# Patient Record
Sex: FEMALE | URBAN - METROPOLITAN AREA
[De-identification: names, ages, dates, MRNs, and addresses within clinical notes are randomized per-mention and may not be internally consistent; named-entity substitution may affect disease eponyms.]

---

## 2020-06-18 ENCOUNTER — APPOINTMENT (OUTPATIENT)
Age: 50
Setting detail: DERMATOLOGY
End: 2020-06-18

## 2020-06-18 ENCOUNTER — TRANSFERRED RECORDS (OUTPATIENT)
Dept: HEALTH INFORMATION MANAGEMENT | Facility: CLINIC | Age: 50
End: 2020-06-18

## 2020-06-18 DIAGNOSIS — L64.8 OTHER ANDROGENIC ALOPECIA: ICD-10-CM

## 2020-06-18 DIAGNOSIS — L63.8 OTHER ALOPECIA AREATA: ICD-10-CM

## 2020-06-18 PROCEDURE — OTHER COUNSELING: OTHER

## 2020-06-18 PROCEDURE — OTHER PRESCRIPTION: OTHER

## 2020-06-18 PROCEDURE — 99203 OFFICE O/P NEW LOW 30 MIN: CPT

## 2020-06-18 RX ORDER — CLOBETASOL PROPIONATE 0.5 MG/ML
SOLUTION TOPICAL BID
Qty: 1 | Refills: 2 | Status: ERX

## 2020-06-18 ASSESSMENT — LOCATION ZONE DERM: LOCATION ZONE: SCALP

## 2020-06-18 ASSESSMENT — LOCATION SIMPLE DESCRIPTION DERM
LOCATION SIMPLE: SCALP
LOCATION SIMPLE: ANTERIOR SCALP

## 2020-06-18 ASSESSMENT — LOCATION DETAILED DESCRIPTION DERM
LOCATION DETAILED: MID-FRONTAL SCALP
LOCATION DETAILED: LEFT SUPERIOR PARIETAL SCALP

## 2020-06-18 NOTE — PROCEDURE: COUNSELING
Detail Level: Zone
Patient Specific Counseling (Will Not Stick From Patient To Patient): If no improvement will consider biopsies in future. She had lab work done with PCP and all was normal. Will discussed Dr. Ward at next appointment if no improvement.  On exam she does have some regrowth and I am hopeful that this will continue.  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  We discussed further workup at length if needed.

## 2020-06-18 NOTE — HPI: HAIR LOSS
Previous Labs: Yes
How Did The Hair Loss Occur?: sudden in onset
How Severe Is Your Hair Loss?: severe
Lab Details: Normal limits

## 2020-07-16 ENCOUNTER — APPOINTMENT (OUTPATIENT)
Age: 50
Setting detail: DERMATOLOGY
End: 2020-07-17

## 2020-07-16 ENCOUNTER — TRANSFERRED RECORDS (OUTPATIENT)
Dept: HEALTH INFORMATION MANAGEMENT | Facility: CLINIC | Age: 50
End: 2020-07-16

## 2020-07-16 DIAGNOSIS — L63.8 OTHER ALOPECIA AREATA: ICD-10-CM

## 2020-07-16 DIAGNOSIS — L64.8 OTHER ANDROGENIC ALOPECIA: ICD-10-CM

## 2020-07-16 PROCEDURE — OTHER PRESCRIPTION: OTHER

## 2020-07-16 PROCEDURE — OTHER COUNSELING: OTHER

## 2020-07-16 PROCEDURE — OTHER INTRALESIONAL KENALOG: OTHER

## 2020-07-16 PROCEDURE — 11900 INJECT SKIN LESIONS </W 7: CPT

## 2020-07-16 PROCEDURE — 99213 OFFICE O/P EST LOW 20 MIN: CPT | Mod: 25

## 2020-07-16 RX ORDER — SPIRONOLACTONE 25 MG/1
TABLET, FILM COATED ORAL
Qty: 60 | Refills: 1 | Status: CANCELLED

## 2020-07-16 RX ORDER — CLOBETASOL PROPIONATE 0.5 MG/ML
SOLUTION TOPICAL BID
Qty: 1 | Refills: 2 | Status: CANCELLED

## 2020-07-16 ASSESSMENT — LOCATION SIMPLE DESCRIPTION DERM
LOCATION SIMPLE: HAIR
LOCATION SIMPLE: ANTERIOR SCALP
LOCATION SIMPLE: SCALP
LOCATION SIMPLE: RIGHT FOREHEAD

## 2020-07-16 ASSESSMENT — LOCATION DETAILED DESCRIPTION DERM
LOCATION DETAILED: HAIR
LOCATION DETAILED: RIGHT FOREHEAD
LOCATION DETAILED: MID-FRONTAL SCALP
LOCATION DETAILED: LEFT SUPERIOR PARIETAL SCALP

## 2020-07-16 ASSESSMENT — LOCATION ZONE DERM
LOCATION ZONE: FACE
LOCATION ZONE: SCALP

## 2020-07-16 NOTE — PROCEDURE: COUNSELING
Detail Level: Zone
Patient Specific Counseling (Will Not Stick From Patient To Patient): If no improvement will consider biopsies in future. She had lab work done with PCP and all was normal. Will discussed Dr. Ward at next appointment if no improvement.  On exam she does have some regrowth and I am hopeful that this will continue.  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  We discussed further workup at length if needed.
Patient Specific Counseling (Will Not Stick From Patient To Patient): Overall she is stable and showing some mild regrowth.  She will CCC with Spironolacrone and purchase Biotin and Rogaine OTC

## 2020-07-16 NOTE — PROCEDURE: INTRALESIONAL KENALOG
Include Z78.9 (Other Specified Conditions Influencing Health Status) As An Associated Diagnosis?: No
Detail Level: Detailed
Kenalog Preparation: Kenalog
Concentration Of Solution Injected (Mg/Ml): 10.0
Consent: The risks of atrophy were reviewed with the patient.
Total Volume Injected (Ccs- Only Use Numbers And Decimals): 2
Medical Necessity Clause: This procedure was medically necessary because the lesions that were treated were:
X Size Of Lesion In Cm (Optional): 0

## 2020-08-05 ENCOUNTER — APPOINTMENT (OUTPATIENT)
Dept: URBAN - METROPOLITAN AREA CLINIC 259 | Age: 50
Setting detail: DERMATOLOGY
End: 2020-08-05

## 2020-08-05 ENCOUNTER — TRANSCRIBE ORDERS (OUTPATIENT)
Dept: OTHER | Age: 50
End: 2020-08-05

## 2020-08-05 DIAGNOSIS — B35.1 TINEA UNGUIUM: ICD-10-CM

## 2020-08-05 DIAGNOSIS — L84 CORNS AND CALLOSITIES: ICD-10-CM

## 2020-08-05 DIAGNOSIS — L65.9 HAIR LOSS: Primary | ICD-10-CM

## 2020-08-05 PROCEDURE — 17110 DESTRUCT B9 LESION 1-14: CPT

## 2020-08-05 PROCEDURE — OTHER COUNSELING: OTHER

## 2020-08-05 PROCEDURE — OTHER BENIGN DESTRUCTION: OTHER

## 2020-08-05 PROCEDURE — 99213 OFFICE O/P EST LOW 20 MIN: CPT | Mod: 25

## 2020-08-05 PROCEDURE — OTHER PRESCRIPTION: OTHER

## 2020-08-05 RX ORDER — CICLOPIROX 80 MG/ML
SOLUTION TOPICAL
Qty: 1 | Refills: 3 | Status: ERX

## 2020-08-05 ASSESSMENT — LOCATION DETAILED DESCRIPTION DERM
LOCATION DETAILED: LEFT PLANTAR FOREFOOT OVERLYING 2ND METATARSAL
LOCATION DETAILED: LEFT GREAT TOENAIL
LOCATION DETAILED: RIGHT GREAT TOENAIL

## 2020-08-05 ASSESSMENT — LOCATION SIMPLE DESCRIPTION DERM
LOCATION SIMPLE: LEFT GREAT TOE
LOCATION SIMPLE: RIGHT GREAT TOE
LOCATION SIMPLE: LEFT PLANTAR SURFACE

## 2020-08-05 ASSESSMENT — LOCATION ZONE DERM
LOCATION ZONE: TOENAIL
LOCATION ZONE: FEET

## 2020-08-05 NOTE — HPI: INFECTION (ONYCHOMYCOSIS)
How Severe Is It?: mild
Is This A New Presentation, Or A Follow-Up?: Nail Infection
Additional History: Patient states that she also has a wart on the bottom of the left foot that is tender with pressure application.

## 2020-08-05 NOTE — PROCEDURE: BENIGN DESTRUCTION
Add 52 Modifier (Optional): no
Anesthesia Volume In Cc: 0.5
Post-Care Instructions: I reviewed with the patient in detail post-care instructions. Patient is to wear sunprotection, and avoid picking at any of the treated lesions. Pt may apply Vaseline to crusted or scabbing areas.
Consent: The patient's consent was obtained including but not limited to risks of crusting, scabbing, blistering, scarring, darker or lighter pigmentary change, recurrence, incomplete removal and infection.
Detail Level: Detailed
Medical Necessity Information: It is in your best interest to select a reason for this procedure from the list below. All of these items fulfill various CMS LCD requirements except the new and changing color options.
Medical Necessity Clause: This procedure was medically necessary because the lesions that were treated were:
Treatment Number (Will Not Render If 0): 0

## 2020-08-11 ENCOUNTER — APPOINTMENT (OUTPATIENT)
Dept: URBAN - METROPOLITAN AREA CLINIC 257 | Age: 50
Setting detail: DERMATOLOGY
End: 2020-08-12

## 2020-08-11 DIAGNOSIS — L63.8 OTHER ALOPECIA AREATA: ICD-10-CM

## 2020-08-11 DIAGNOSIS — L64.8 OTHER ANDROGENIC ALOPECIA: ICD-10-CM

## 2020-08-11 PROCEDURE — OTHER ORDER TESTS: OTHER

## 2020-08-11 PROCEDURE — OTHER PRESCRIPTION: OTHER

## 2020-08-11 PROCEDURE — 99213 OFFICE O/P EST LOW 20 MIN: CPT | Mod: 24

## 2020-08-11 PROCEDURE — OTHER COUNSELING: OTHER

## 2020-08-11 RX ORDER — SPIRONOLACTONE 50 MG/1
TABLET, FILM COATED ORAL BID
Qty: 60 | Refills: 3 | Status: ERX

## 2020-08-11 RX ORDER — SPIRONOLACTONE 25 MG/1
TABLET, FILM COATED ORAL
Qty: 60 | Refills: 1 | Status: CANCELLED

## 2020-08-11 ASSESSMENT — LOCATION DETAILED DESCRIPTION DERM
LOCATION DETAILED: LEFT SUPERIOR PARIETAL SCALP
LOCATION DETAILED: MID-FRONTAL SCALP

## 2020-08-11 ASSESSMENT — LOCATION SIMPLE DESCRIPTION DERM
LOCATION SIMPLE: ANTERIOR SCALP
LOCATION SIMPLE: SCALP

## 2020-08-11 ASSESSMENT — LOCATION ZONE DERM: LOCATION ZONE: SCALP

## 2020-08-11 NOTE — PROCEDURE: COUNSELING
Detail Level: Zone
Patient Specific Counseling (Will Not Stick From Patient To Patient): Overall she is seeing increase in hair loss.  We will do the hair loss workup and increase her spironolactone.  We will also again refer her to Dr Ward for further evaluation as well.  She will CCC with Biotin and Rogaine OTC
Patient Specific Counseling (Will Not Stick From Patient To Patient): If no improvement will consider biopsies in future. She had lab work done with PCP and all was normal but it is reasonable to do some labs today as above. On exam she does have some regrowth and I am hopeful that this will continue.  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  We discussed the further workup at length and her referal to Dr Ward.  She agrees with the plan.

## 2020-08-12 ENCOUNTER — APPOINTMENT (OUTPATIENT)
Dept: URBAN - METROPOLITAN AREA CLINIC 259 | Age: 50
Setting detail: DERMATOLOGY
End: 2020-08-12

## 2020-08-12 DIAGNOSIS — L63.8 OTHER ALOPECIA AREATA: ICD-10-CM

## 2020-08-12 PROCEDURE — OTHER ORDER TESTS: OTHER

## 2020-08-26 ENCOUNTER — TRANSFERRED RECORDS (OUTPATIENT)
Dept: HEALTH INFORMATION MANAGEMENT | Facility: CLINIC | Age: 50
End: 2020-08-26

## 2020-08-27 ENCOUNTER — APPOINTMENT (OUTPATIENT)
Dept: URBAN - METROPOLITAN AREA CLINIC 259 | Age: 50
Setting detail: DERMATOLOGY
End: 2020-08-28

## 2020-08-27 DIAGNOSIS — L63.0 ALOPECIA (CAPITIS) TOTALIS: ICD-10-CM

## 2020-08-27 PROCEDURE — OTHER PRESCRIPTION: OTHER

## 2020-08-27 PROCEDURE — OTHER COUNSELING: OTHER

## 2020-08-27 PROCEDURE — 99213 OFFICE O/P EST LOW 20 MIN: CPT

## 2020-08-27 RX ORDER — CLOBETASOL PROPIONATE 0.05 G/100ML
SHAMPOO TOPICAL
Qty: 1 | Refills: 5 | Status: ERX

## 2020-08-27 NOTE — PROCEDURE: COUNSELING
Detail Level: Detailed
Patient Specific Counseling (Will Not Stick From Patient To Patient): Her mixed pattern of alopecia has progressed to more of a totalis.  At this point we will continue her Biotiin 5000mcg daily, Spironolactone 50 mg bid and add Clobex shampoo daily for her.  We will again reach out to Dr Rivero's office and try to get her in there as soon as possible.  Prescription for wig written.  Her hair loss labs are all reviewed today and all in normal ranges.  She is also seeing a natropathic medicine provider and I asked her to d/c this for now until she can meet with Dr Ward.  She agrees to plan.

## 2020-09-24 ENCOUNTER — TRANSFERRED RECORDS (OUTPATIENT)
Dept: HEALTH INFORMATION MANAGEMENT | Facility: CLINIC | Age: 50
End: 2020-09-24

## 2020-09-24 ENCOUNTER — APPOINTMENT (OUTPATIENT)
Dept: URBAN - METROPOLITAN AREA CLINIC 259 | Age: 50
Setting detail: DERMATOLOGY
End: 2020-09-25

## 2020-09-24 DIAGNOSIS — R20.8 OTHER DISTURBANCES OF SKIN SENSATION: ICD-10-CM

## 2020-09-24 DIAGNOSIS — L63.0 ALOPECIA (CAPITIS) TOTALIS: ICD-10-CM

## 2020-09-24 PROBLEM — L65.9 NONSCARRING HAIR LOSS, UNSPECIFIED: Status: ACTIVE | Noted: 2020-09-24

## 2020-09-24 PROCEDURE — 88305 TISSUE EXAM BY PATHOLOGIST: CPT

## 2020-09-24 PROCEDURE — OTHER COUNSELING: OTHER

## 2020-09-24 PROCEDURE — 11104 PUNCH BX SKIN SINGLE LESION: CPT

## 2020-09-24 PROCEDURE — 99213 OFFICE O/P EST LOW 20 MIN: CPT | Mod: 25

## 2020-09-24 PROCEDURE — OTHER PATHOLOGY BILLING: OTHER

## 2020-09-24 PROCEDURE — OTHER BIOPSY BY PUNCH METHOD: OTHER

## 2020-09-24 PROCEDURE — 11105 PUNCH BX SKIN EA SEP/ADDL: CPT

## 2020-09-24 ASSESSMENT — LOCATION SIMPLE DESCRIPTION DERM: LOCATION SIMPLE: SCALP

## 2020-09-24 ASSESSMENT — LOCATION ZONE DERM: LOCATION ZONE: SCALP

## 2020-09-24 ASSESSMENT — LOCATION DETAILED DESCRIPTION DERM: LOCATION DETAILED: LEFT SUPERIOR PARIETAL SCALP

## 2020-09-24 NOTE — PROCEDURE: PATHOLOGY BILLING
Immunohistochemistry ([id number] and [id number]) billing is not performed here. Please use the Immunohistochemistry Stain Billing plan to accomplish this. Immunohistochemistry ([id number] and [id number]) billing is not performed here. Please use the Immunohistochemistry Stain Billing plan to accomplish this.

## 2020-09-24 NOTE — PROCEDURE: BIOPSY BY PUNCH METHOD
Validate Triangulation: No
Validate Note Data (See Information Below): Yes
X Size Of Lesion In Cm (Optional): 0
Body Location Override (Optional - Billing Will Still Be Based On Selected Body Map Location If Applicable): left crown - medial
Punch Size In Mm: 3
Anesthesia Volume In Cc (Will Not Render If 0): 0.5
Post-Care Instructions: I reviewed with the patient in detail post-care instructions. Patient is to keep the biopsy site dry overnight, and then apply bacitracin twice daily until healed. Patient may apply hydrogen peroxide soaks to remove any crusting.
Epidermal Sutures: gel foam
Body Location Override (Optional - Billing Will Still Be Based On Selected Body Map Location If Applicable): left crown -lateral
Billing Type: Third-Party Bill
Hemostasis: None
Anesthesia Type: 1% lidocaine with epinephrine
Wound Care: Petrolatum
Notification Instructions: Patient will be notified of biopsy results. However, patient instructed to call the office if not contacted within 2 weeks.
Consent: Written consent was obtained and risks were reviewed including but not limited to scarring, infection, bleeding, scabbing, incomplete removal, nerve damage and allergy to anesthesia.
Information: Selecting Yes will display possible errors in your note based on the variables you have selected. This validation is only offered as a suggestion for you. PLEASE NOTE THAT THE VALIDATION TEXT WILL BE REMOVED WHEN YOU FINALIZE YOUR NOTE. IF YOU WANT TO FAX A PRELIMINARY NOTE YOU WILL NEED TO TOGGLE THIS TO 'NO' IF YOU DO NOT WANT IT IN YOUR FAXED NOTE.
Biopsy Type: H and E
Post-Care Instructions: I reviewed with the patient in detail post-care instructions. Patient is to keep the biopsy site covered with Vaseline and bandage for 1 week
Dressing: bandage
Detail Level: Detailed
Home Suture Removal Text: Patient was provided a home suture removal kit and will remove their sutures at home.  If they have any questions or difficulties they will call the office.

## 2020-09-24 NOTE — PROCEDURE: COUNSELING
Patient Specific Counseling (Will Not Stick From Patient To Patient): She has an appointment to see Dr Ward in a few months and we will do the biopises today to confirm alopecia totalis/areata
Detail Level: Detailed

## 2021-02-22 ENCOUNTER — DOCUMENTATION ONLY (OUTPATIENT)
Dept: CARE COORDINATION | Facility: CLINIC | Age: 51
End: 2021-02-22

## 2021-03-15 ENCOUNTER — OFFICE VISIT (OUTPATIENT)
Dept: DERMATOLOGY | Facility: CLINIC | Age: 51
End: 2021-03-15
Payer: COMMERCIAL

## 2021-03-15 ENCOUNTER — VIRTUAL VISIT (OUTPATIENT)
Dept: ENDOCRINOLOGY | Facility: CLINIC | Age: 51
End: 2021-03-15
Payer: COMMERCIAL

## 2021-03-15 DIAGNOSIS — R94.6 ABNORMAL FINDING ON THYROID FUNCTION TEST: ICD-10-CM

## 2021-03-15 DIAGNOSIS — L65.9 LOSS OF HAIR: ICD-10-CM

## 2021-03-15 DIAGNOSIS — E05.90 HYPERTHYROIDISM: Primary | ICD-10-CM

## 2021-03-15 DIAGNOSIS — L63.1 ALOPECIA UNIVERSALIS: Primary | ICD-10-CM

## 2021-03-15 DIAGNOSIS — L65.9 ALOPECIA: ICD-10-CM

## 2021-03-15 DIAGNOSIS — R89.9 ABNORMAL LABORATORY TEST: ICD-10-CM

## 2021-03-15 LAB
ALBUMIN SERPL-MCNC: 4.1 G/DL (ref 3.4–5)
ALP SERPL-CCNC: 98 U/L (ref 40–150)
ALT SERPL W P-5'-P-CCNC: 40 U/L (ref 0–50)
ANION GAP SERPL CALCULATED.3IONS-SCNC: 6 MMOL/L (ref 3–14)
AST SERPL W P-5'-P-CCNC: 20 U/L (ref 0–45)
BASOPHILS # BLD AUTO: 0.1 10E9/L (ref 0–0.2)
BASOPHILS NFR BLD AUTO: 0.9 %
BILIRUB SERPL-MCNC: 0.4 MG/DL (ref 0.2–1.3)
BUN SERPL-MCNC: 9 MG/DL (ref 7–30)
CALCIUM SERPL-MCNC: 9.8 MG/DL (ref 8.5–10.1)
CHLORIDE SERPL-SCNC: 109 MMOL/L (ref 94–109)
CO2 SERPL-SCNC: 28 MMOL/L (ref 20–32)
CREAT SERPL-MCNC: 0.74 MG/DL (ref 0.52–1.04)
DEPRECATED CALCIDIOL+CALCIFEROL SERPL-MC: 119 UG/L (ref 20–75)
DHEA-S SERPL-MCNC: 172 UG/DL (ref 35–430)
DIFFERENTIAL METHOD BLD: ABNORMAL
EOSINOPHIL # BLD AUTO: 0.3 10E9/L (ref 0–0.7)
EOSINOPHIL NFR BLD AUTO: 3.5 %
ERYTHROCYTE [DISTWIDTH] IN BLOOD BY AUTOMATED COUNT: 17.9 % (ref 10–15)
FERRITIN SERPL-MCNC: 214 NG/ML (ref 8–252)
FSH SERPL-ACNC: 43.8 IU/L
GFR SERPL CREATININE-BSD FRML MDRD: >90 ML/MIN/{1.73_M2}
GLUCOSE SERPL-MCNC: 105 MG/DL (ref 70–99)
HCT VFR BLD AUTO: 43.8 % (ref 35–47)
HGB BLD-MCNC: 13.7 G/DL (ref 11.7–15.7)
IMM GRANULOCYTES # BLD: 0 10E9/L (ref 0–0.4)
IMM GRANULOCYTES NFR BLD: 0.4 %
IRON SATN MFR SERPL: 27 % (ref 15–46)
IRON SERPL-MCNC: 82 UG/DL (ref 35–180)
LYMPHOCYTES # BLD AUTO: 2.2 10E9/L (ref 0.8–5.3)
LYMPHOCYTES NFR BLD AUTO: 27.3 %
MCH RBC QN AUTO: 26 PG (ref 26.5–33)
MCHC RBC AUTO-ENTMCNC: 31.3 G/DL (ref 31.5–36.5)
MCV RBC AUTO: 83 FL (ref 78–100)
MONOCYTES # BLD AUTO: 0.6 10E9/L (ref 0–1.3)
MONOCYTES NFR BLD AUTO: 6.7 %
NEUTROPHILS # BLD AUTO: 5 10E9/L (ref 1.6–8.3)
NEUTROPHILS NFR BLD AUTO: 61.2 %
NRBC # BLD AUTO: 0 10*3/UL
NRBC BLD AUTO-RTO: 0 /100
PLATELET # BLD AUTO: 357 10E9/L (ref 150–450)
POTASSIUM SERPL-SCNC: 4 MMOL/L (ref 3.4–5.3)
PROT SERPL-MCNC: 7.3 G/DL (ref 6.8–8.8)
RBC # BLD AUTO: 5.27 10E12/L (ref 3.8–5.2)
SODIUM SERPL-SCNC: 143 MMOL/L (ref 133–144)
TIBC SERPL-MCNC: 310 UG/DL (ref 240–430)
WBC # BLD AUTO: 8.2 10E9/L (ref 4–11)

## 2021-03-15 PROCEDURE — 84270 ASSAY OF SEX HORMONE GLOBUL: CPT | Mod: 90 | Performed by: PATHOLOGY

## 2021-03-15 PROCEDURE — 83540 ASSAY OF IRON: CPT | Performed by: PATHOLOGY

## 2021-03-15 PROCEDURE — 36415 COLL VENOUS BLD VENIPUNCTURE: CPT | Performed by: PATHOLOGY

## 2021-03-15 PROCEDURE — 83550 IRON BINDING TEST: CPT | Performed by: PATHOLOGY

## 2021-03-15 PROCEDURE — 99000 SPECIMEN HANDLING OFFICE-LAB: CPT | Performed by: PATHOLOGY

## 2021-03-15 PROCEDURE — 99204 OFFICE O/P NEW MOD 45 MIN: CPT | Mod: GT | Performed by: INTERNAL MEDICINE

## 2021-03-15 PROCEDURE — 83001 ASSAY OF GONADOTROPIN (FSH): CPT | Performed by: PATHOLOGY

## 2021-03-15 PROCEDURE — 99204 OFFICE O/P NEW MOD 45 MIN: CPT | Mod: GC | Performed by: DERMATOLOGY

## 2021-03-15 PROCEDURE — 84403 ASSAY OF TOTAL TESTOSTERONE: CPT | Mod: 90 | Performed by: PATHOLOGY

## 2021-03-15 PROCEDURE — 84630 ASSAY OF ZINC: CPT | Mod: 90 | Performed by: PATHOLOGY

## 2021-03-15 PROCEDURE — 85025 COMPLETE CBC W/AUTO DIFF WBC: CPT | Performed by: PATHOLOGY

## 2021-03-15 PROCEDURE — 84255 ASSAY OF SELENIUM: CPT | Mod: 90 | Performed by: PATHOLOGY

## 2021-03-15 PROCEDURE — 82306 VITAMIN D 25 HYDROXY: CPT | Mod: 90 | Performed by: PATHOLOGY

## 2021-03-15 PROCEDURE — 82728 ASSAY OF FERRITIN: CPT | Performed by: PATHOLOGY

## 2021-03-15 PROCEDURE — 80053 COMPREHEN METABOLIC PANEL: CPT | Performed by: PATHOLOGY

## 2021-03-15 PROCEDURE — 82627 DEHYDROEPIANDROSTERONE: CPT | Mod: 90 | Performed by: PATHOLOGY

## 2021-03-15 RX ORDER — METHIMAZOLE 10 MG/1
5 TABLET ORAL DAILY
Qty: 30 TABLET | Refills: 0
Start: 2021-03-15 | End: 2021-03-22

## 2021-03-15 RX ORDER — CHOLECALCIFEROL (VITAMIN D3) 125 MCG
CAPSULE ORAL
COMMUNITY
End: 2021-07-04

## 2021-03-15 RX ORDER — ERGOCALCIFEROL 1.25 MG/1
CAPSULE, LIQUID FILLED ORAL
COMMUNITY
End: 2021-07-04

## 2021-03-15 RX ORDER — METHIMAZOLE 10 MG/1
5 TABLET ORAL DAILY
Qty: 30 TABLET | Refills: 0 | Status: CANCELLED | OUTPATIENT
Start: 2021-03-15

## 2021-03-15 RX ORDER — METHIMAZOLE 10 MG/1
10 TABLET ORAL DAILY
COMMUNITY
Start: 2021-03-06 | End: 2021-03-15

## 2021-03-15 ASSESSMENT — PAIN SCALES - GENERAL: PAINLEVEL: NO PAIN (0)

## 2021-03-15 NOTE — PROGRESS NOTES
[facility]atology Note  Encounter Date: Mar 15, 2021  Office visit    Dermatology Problem List:  1. 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 Possible component of telogen effluvium.  * Reviewed labs today: TSH, Free T4, Derm path report 9/24/20  - Will get new labs today: Ferritin, total iron, iron binding capacity, CBC with differential, zinc, DHEAS, Free testosterone and total vitamin D, selenium, FSH were ordered  - interested in learning more about the clinical trials - will have research staff reach out to patient  - not doing any topical therapies currently d/t shoulder and elbow pain   - consider Xeljanz in the future, provided patient with Journal article regarding safety and efficacy of Xeljanz  - patient says she plans to hold off on the DPCP at [place] for now     Procedures Performed:   None    Follow-up: 2 months    Staff and Resident:   Kelly Botello MD, Dermatology Resident, PGY-2      Patient was seen and examined with the dermatology resident. I agree with the history, review of systems, physical examination, assessments and plan.    Zainab Oliver MD  Professor and  Chair  Department of Dermatology  [facility]  ____________________________________________    CC: Hair Loss (Lupe is here today for loss of all hair. She states she has lost all of her body hair.)      HPI:  Ms. Lupe Deluna is a(n) 50 year old female who presents today as a new patient for evaluation of hair loss.     She is being seen at the request of  Guy Mckeon PA-C from [facility]. Referred for confirmation of diagnosis and further management.     Says she had a flu-like illness in Nov 2019 and started to have shoulder  "pain as well at this time. Hair loss started in March 2020. At that time, she noticed a \"receding spot\" on the front of the hair line and noticed a little bit more shedding in the shower. Then over subsequent months started to notice more shedding. First saw Derm in June 2020 at [place] [facility]. Initially thought to be \"female pattern hair loss\" and put on spironolactone and topical steroid. Went back in July 2020 and started Rogaine and ILK injections and was continued on spironolactone. Early August, started to notice even more shedding frontal scalp > posterior scalp. Reports that she had labs done at that point and they were unremarkable. Saw a holistic NP in [place] and started doing \"bioidential hormone\" therapy with her (replacement of testosterone, progresterone, and estrogen - all of these she started after the hair loss started). Also starting supplements, thinks maybe it contains selenium. Then saw [facility] and they said all the labs are normal so that they weren't going to do anything. Had continued shoulder pain throughout this whole course so PCP referred her to Rheum. Has been evaluated by Rheum and was told that the shoulder pain is likely unrelated. Later TSH was rechecked and it was very low. Was started on the methimazole 1/9/21. Has not noticed any improvement since starting the methimazole. Plans to switch to [facility] for further management. Say [facility] Jan and Feb 2021. Was put on pred taper starting at 60 mg, more recent dose 3/3/21. Considering start DPCP with [facility].     Denies any hx of asthma or eczema. Denies any family hx of alopecia areata or type I DM.     Previous treatments: Rogaine, ILK, oral spironolactone, topical clobetasol, clobex shampoo, and prednisone taper from 60 mg, last dose final dose was 3/3/21.     Current treatments: None. (has Triam for eyebrows and Latisse for eyelashes but not currently using any topicals because she sprained her elbow and has difficulty " applying them).     - Scalp pain: no  - Scalp burning: no  - Scalp itching: no  - Eyebrow or eyelash changes: yes, complete loss  - Nail changes: yes    ROS: As per HPI    Labs:  TSH reviewed.    3/1/21:   - TSH: 4.44  - T4, Free: 0.81    12/24/20:   - TSH: <0.01 (low)  - Free T4 3.3 (high)    Dermpath report - reviewed  9/24/20  - c/w alopecia areta  - full report in Media tab     Physical Exam:  Vitals: There were no vitals taken for this visit.  SKIN:   Focused exam of scalp, forehead, and hands was performed and was significant for:  - fine vellus fibers on the midface region  - no evident hair on scalp, eyebrows, or eyelashes; follicular ostia intact   - horizontal ridging on nails and irregular pitting on all fingernails     Medications:  Current Outpatient Medications   Medication     Prasterone, DHEA, (DHEA 50) 50 MG CAPS     Calcium Carb-Cholecalciferol (CALCIUM 1000 + D PO)     methimazole (TAPAZOLE) 10 MG tablet     Multiple Vitamins-Minerals (ONE-A-DAY WITHIN) TABS     Quercetin 250 MG TABS     vitamin D2 (ERGOCALCIFEROL) 54495 units (1250 mcg) capsule     No current facility-administered medications for this visit.       Past Medical/Surgical History:   There is no problem list on file for this patient.    History reviewed. No pertinent past medical history.    [facility]  [address]  [place] [address] on close of this encounter.

## 2021-03-15 NOTE — LETTER
3/15/2021       RE: Lupe Deluna  [address] [address]     Dear Colleague,    Thank you for referring your patient, Lupe Deluna, to the [facility] [place] at [facility]. Please see a copy of my visit note below.    [facility] Dermatology Note  Encounter Date: Mar 15, 2021  Office visit    Dermatology Problem List:  1. 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 Possible component of telogen effluvium.  * Reviewed labs today: TSH, Free T4, Derm path report 9/24/20  - Will get new labs today: Ferritin, total iron, iron binding capacity, CBC with differential, zinc, DHEAS, Free testosterone and total vitamin D, selenium, FSH were ordered  - interested in learning more about the clinical trials - will have research staff reach out to patient  - not doing any topical therapies currently d/t shoulder and elbow pain   - consider Xeljanz in the future, provided patient with Journal article regarding safety and efficacy of Xeljanz  - patient says she plans to hold off on the DPCP at [place] for now     Procedures Performed:   None    Follow-up: 2 months    Staff and Resident:   Kelly Botello MD, Dermatology Resident, PGY-2      Patient was seen and examined with the dermatology resident. I agree with the history, review of systems, physical examination, assessments and plan.    Zainab Oliver MD  Professor and  Chair  Department of Dermatology  [facility]  ____________________________________________    CC: Hair Loss (Lupe is here today for loss of all hair. She states she has lost all of her body hair.)      HPI:  Ms.  "Lupe Deluna is a(n) 50 year old female who presents today as a new patient for evaluation of hair loss.     She is being seen at the request of  Guy Mckeon PA-C from [facility]. Referred for confirmation of diagnosis and further management.     Says she had a flu-like illness in Nov 2019 and started to have shoulder pain as well at this time. Hair loss started in March 2020. At that time, she noticed a \"receding spot\" on the front of the hair line and noticed a little bit more shedding in the shower. Then over subsequent months started to notice more shedding. First saw Derm in June 2020 at [facility]. Initially thought to be \"female pattern hair loss\" and put on spironolactone and topical steroid. Went back in July 2020 and started Rogaine and ILK injections and was continued on spironolactone. Early August, started to notice even more shedding frontal scalp > posterior scalp. Reports that she had labs done at that point and they were unremarkable. Saw a holistic NP in [place] and started doing \"bioidential hormone\" therapy with her (replacement of testosterone, progresterone, and estrogen - all of these she started after the hair loss started). Also starting supplements, thinks maybe it contains selenium. Then saw endo and they said all the labs are normal so that they weren't going to do anything. Had continued shoulder pain throughout this whole course so PCP referred her to Rheum. Has been evaluated by Rheum and was told that the shoulder pain is likely unrelated. Later TSH was rechecked and it was very low. Was started on the methimazole 1/9/21. Has not noticed any improvement since starting the methimazole. Plans to switch to [facility] for further management. Say [facility] Jan and Feb 2021. Was put on pred taper starting at 60 mg, more recent dose 3/3/21. Considering start DPCP with [facility].     Denies any hx of asthma or eczema. Denies any family hx of alopecia areata or type I DM. "     Previous treatments: Rogaine, ILK, oral spironolactone, topical clobetasol, clobex shampoo, and prednisone taper from 60 mg, last dose final dose was 3/3/21.     Current treatments: None. (has Triam for eyebrows and Latisse for eyelashes but not currently using any topicals because she sprained her elbow and has difficulty applying them).     - Scalp pain: no  - Scalp burning: no  - Scalp itching: no  - Eyebrow or eyelash changes: yes, complete loss  - Nail changes: yes    ROS: As per HPI    Labs:  TSH reviewed.    3/1/21:   - TSH: 4.44  - T4, Free: 0.81    12/24/20:   - TSH: <0.01 (low)  - Free T4 3.3 (high)    Dermpath report - reviewed  9/24/20  - c/w alopecia areta  - full report in Media tab     Physical Exam:  Vitals: There were no vitals taken for this visit.  SKIN:   Focused exam of scalp, forehead, and hands was performed and was significant for:  - fine vellus fibers on the midface region  - no evident hair on scalp, eyebrows, or eyelashes; follicular ostia intact   - horizontal ridging on nails and irregular pitting on all fingernails     Medications:  Current Outpatient Medications   Medication     Prasterone, DHEA, (DHEA 50) 50 MG CAPS     Calcium Carb-Cholecalciferol (CALCIUM 1000 + D PO)     methimazole (TAPAZOLE) 10 MG tablet     Multiple Vitamins-Minerals (ONE-A-DAY WITHIN) TABS     Quercetin 250 MG TABS     vitamin D2 (ERGOCALCIFEROL) 28350 units (1250 mcg) capsule     No current facility-administered medications for this visit.       Past Medical/Surgical History:   There is no problem list on file for this patient.    History reviewed. No pertinent past medical history.    CC Guy Mckeon PA-C  [facility]  [address]  [place] [address] on close of this encounter.         Again, thank you for allowing me to participate in the care of your patient.      Sincerely,    Zainab Oliver MD

## 2021-03-15 NOTE — LETTER
3/15/2021         RE: Lupe Deluna  [address] [address]        Dear Colleague,    Thank you for referring your patient, Lupe Deluna, to the [facility]. Please see a copy of my visit note below.    Endocrine Diabetes Consult Video visit note-     Due to the COVID 19 pandemic this visit was a telephone /video visit in order to help prevent spread of infection in this high risk patient and the general population. The patient gave verbal consent for the visit today.    Start time 09: 58 am  Stop time 11: 17 am    Chief complaint: Hyperthyroidism, hair loss  Referring provider: self referred      Assessment/Plan :   49 yo F with PMH of alopecia universalis who presents for evaluation of abnormal thyroid labs    Biochemical hyperthyroidism in 12/2020  She had biochemical evidence of hyperthyroidism on 12/14/20 (was on biotin at that time) with persistent hyperthyroid pattern on repeat labs on 12/24/2020 (a few days off biotin). TRab was negative, however TSI was never checked. Thyroid US favored thyroiditis, however she was already started on methimazole and last labs on 3/1 showed TSH on ULN at 4.44 and fT4 at lower limit 0.81.  The clinical picture is not clear here. She denies any hx of viral illness in the last 6 months. Differential diagnosis includes grave's disease vs thyroiditis vs biotin induced lab abnormality.    - Based on her recent TFTs, will decrease her methimazole to 5 mg daily.  - Add on free T4 and TSI to today's labs  - Repeat TSH and free T4 in one month    Alopecia universalis:  She has severe alopecia. We explained her that alopecia can be be seen in untreated hyop- or hyperthyroidism. However in her case, the appearance of alopecia far precedes the development of abnormal TFTS by almost one year, making it unlikely to be the causative agent here. However, both graves disease and alopecia have autoimmune pathology and presence of  "one autoimmune disease increases the likelihood of another autoimmune disease.    S/p hysterectomy in 2015  On bio-identical hormones (testosterone, estrogen and progesterone) prescribed by a PA at [facility]    C in 3 months    The case was discussed with my attending, Dr. Singleton.    Marysol Dodd MD  PGY-4 Endocrine Fellow        HISTORY OF PRESENT ILLNESS  Lupe is a 51 yo F with PMH of alopecia universalis who presents for evaluation of abnormal thyroid labs.    She states she started losing her hair in early 2020 to the point that she had to shave her head in august 2020. She has lost her eyelashes, eyebrows and has absolutely no hair left on her body. She has seen dermatology, has had biopsy of the scalp, scalp steroid injections and finished a prednisone taper from 1/20 - 3/3, all without any improvement.    She has always thought that her alopecia is secondary to her thyroid illness. She also had frozen shoulder in 2019 that she also links with underlying thyroid problem. She reports extensive hx of thyroid problems on his father's side.  However her thyroid labs have always been normal since 2005.    She knows somebody who had alopecia and normal thyroid labs, yet started synthroid and her hair grew back. She asked a PA-c that she sees for her hormones for synthroid who checked her labs.  12/14/2020: TSH <0.01, fT4 2.12 and free T3 6.22. However she was on biotin at that time. She has never been on synthroid.  [facility] was [facility] to [facility].    Labs repeated at [facility] (a few days after stopping biotin) showed persistent results.  12/24/2020: TSH <0.01, free T4 3.3, total T3 250, TRab <0.01.  She was started on methimazole 20 mg daily for a month, that was tapered to 10 mg daily that she is still taking.    US on 1/20/2021 showed heterogenous hyperemic thyroid gland, likely reflecting thyroiditis, without any nodules.    Labs on 3/1/2021 showed normal TSH at ULN 4.44 and free T4 0.81    She is s/p " "hysterectomy in 2015 for uterine mass and dysmenorrhea, was not on HRT. However she started seeing a PA at [facility] who started her on \"bioidentical hormones\", testosterone, estrogen and progesterone in aug 2020.    She has been having hot flashes for a year, has always been on the constipated side. She reports palpitations, anxiety due to her alopecia, brittle nails and increased sweating for 1 year. Denies any tremor, neck swelling, compressive symptoms.    Denies any URI or viral infection in the last 6 months.    Reports eye dryness.    She has been on autoimmune diet.  Family hx is positive for hypothyroidism on father's side, parathyroid problem in paternal grandmother and lupus in her daughter.    She works as a . Lives with her boyfriend and BF's father.      REVIEW OF SYSTEMS    10 system ROS otherwise as per the HPI or negative    Past Medical History  History reviewed. No pertinent past medical history.    Medications  Current Outpatient Medications   Medication Sig Dispense Refill     Calcium Carb-Cholecalciferol (CALCIUM 1000 + D PO)        Cholecalciferol (VITAMIN D) 125 MCG (5000 UT) capsule        methimazole (TAPAZOLE) 10 MG tablet Take 10 mg by mouth daily       Multiple Vitamins-Minerals (ZINC PO) Take by mouth daily       NONFORMULARY Estrogen/Testosterone cream 4mg       NONFORMULARY Methyl B Complex       NONFORMULARY progesterone 150 mg capsule       Quercetin 250 MG TABS 500 mg       Multiple Vitamins-Minerals (ONE-A-DAY WITHIN) TABS        NONFORMULARY Apply 1 g topically estradiol 0.03 %, testosterone 0.01 % in versabase cream (vaginal)(tube)       NONFORMULARY NALTREXONE HCL, BULK,       NONFORMULARY POTASSIUM CITRATE ORAL       Prasterone, DHEA, (DHEA 50) 50 MG CAPS        Prasterone, DHEA, 10 MG CAPS        vitamin D2 (ERGOCALCIFEROL) 97950 units (1250 mcg) capsule            Active Diet Order  None      Allergies  Allergies   Allergen Reactions     Tramadol " Itching     Sulfa Drugs Diarrhea       Family History  family history is not on file.    Social History  Social History     Tobacco Use     Smoking status: Former Smoker     Smokeless tobacco: Never Used   Substance Use Topics     Alcohol use: None     Drug use: None       Physical Exam  There were no vitals taken for this visit.  There is no height or weight on file to calculate BMI.  GENERAL :  In no apparent distress  GENERAL: Healthy, alert and no distress  EYES: Eyes grossly normal to inspection.  No discharge or erythema, or obvious scleral/conjunctival abnormalities.  RESP: No audible wheeze, cough, or visible cyanosis.  No visible retractions or increased work of breathing.    SKIN: Visible skin clear. No significant rash, abnormal pigmentation or lesions.  NEURO: Cranial nerves grossly intact.  Mentation and speech appropriate for age.  PSYCH: Mentation appears normal, affect normal/bright, judgement and insight intact, normal speech and appearance well-groomed.     DATA REVIEW  6/4/2020: FSH 11.8  8/12/2020: LH 8, DHEAS 99.8, testosterone 18  8/26/2020: salivary cortisol 8/26/20     thyroid 1/20/2021  Heterogeneous thyroid parenchyma with hyperemia may reflect changes of thyroiditis. No distinct thyroid nodules.    Attestation    I have reviewed chart, obtained history, and discussed management plan with the patient with the Endocrinology Fellow on March 15, 2021.  I agree with the assessment and plan of care as documented above.      Discussed and share the following pt instructions with the patient.   Patient Instructions   Lupe,    #1 hyperthyroidism -we noted that your thyroid blood work results have been in the hyperthyroid or overactive range in December 2020.  We see that based on those readings you have been started on methimazole treatment at an outside facility.  Blood work results for thyroid not available in January and February 2021 however recently done thyroid blood work results in March  2021 showed normal thyroid blood work results.  In fact, the TSH is high normal.  Results copied below.  What does these results mean?  What is underlying cause for hyperthyroidism?    We are considering two underlying mechanisms as a possible underlying cause for hyperthyroidism.  #1 is a Graves' disease.  In order to assess this further we will going to be checking TSI or Graves' antibody.   It was not possible to add this blood work to the blood work already drawn today.  Therefore, please make an appointment to go to the lab to have your blood drawn at your convenience.  Here is the lab number to call and schedule an appointment. Lab scheduling to schedule at any [place] lab locations: [phone] select option 1  #2 possibility is thyroiditis or inflammation of the thyroid causing the overactive thyroid back in December 2020.  If Graves' antibody is negative and if subsequent thyroid blood work results indicate that your thyroid blood work continues to be within the normal limits then we can make a conclusion that the blood work back in December was secondary to thyroiditis.    The second question we addressed during your visit is if your hair loss and thyroid condition are related.  Your hair loss started before your thyroid abnormality therefore it is hard to say that thyroid is underlying cause for your hair loss.  However, there is a connection between alopecia areata and thyroid because both conditions can be caused by autoimmune disorder.  Both hyper and hypothyroidism can cause hair loss however, considering the extent of hair loss and considering the fact that the hair loss precedes the thyroid disorder; it is hard to establish causality at this point in time.    #3 based on the blood work results reduce methimazole to 5 mg daily from your current dose of 10 mg daily.    I will contact you once I have blood work results.  In the meantime if you have any questions please do not  hesitate to reach out to me.          Nura Singleton MD  Staff Endocrinologist   Endocrinology and Metabolism  [facility]          Lupe is a 50 year old who is being evaluated via a billable video visit.      How would you like to obtain your AVS? MyChart  If the video visit is dropped, the invitation should be resent by: Other e-mail: My chart   Will anyone else be joining your video visit? No            Again, thank you for allowing me to participate in the care of your patient.        Sincerely,        Nura Singleton MD

## 2021-03-15 NOTE — NURSING NOTE
Per , writer requested US images from 1/20/21  to be pushed to [place] from [facility].     Chely Chan MA  Adult Endocrinology  [facility]

## 2021-03-15 NOTE — PATIENT INSTRUCTIONS
We will have one of our research staff reach out to you.     https://www.ncbi.nlm.nih.gov/pmc/articles/ZOS7772542/[email or web address].pdf

## 2021-03-15 NOTE — PROGRESS NOTES
Lupe is a 50 year old who is being evaluated via a billable video visit.      How would you like to obtain your AVS? MyChart  If the video visit is dropped, the invitation should be resent by: Other e-mail: My chart   Will anyone else be joining your video visit? No

## 2021-03-15 NOTE — PROGRESS NOTES
"Endocrine Diabetes Consult Video visit note-     Due to the COVID 19 pandemic this visit was a telephone /video visit in order to help prevent spread of infection in this high risk patient and the general population. The patient gave verbal consent for the visit today.    Start time 09: 58 am  Stop time 11: 17 am    Chief complaint: Hyperthyroidism, hair loss  Referring provider: self referred      Assessment/Plan :   49 yo F with PMH of alopecia universalis who presents for evaluation of abnormal thyroid labs    Biochemical hyperthyroidism in 12/2020  She had biochemical evidence of hyperthyroidism on 12/14/20 (was on biotin at that time) with persistent hyperthyroid pattern on repeat labs on 12/24/2020 (a few days off biotin). TRab was negative, however TSI was never checked. Thyroid US favored thyroiditis, however she was already started on methimazole and last labs on 3/1 showed TSH on ULN at 4.44 and fT4 at lower limit 0.81.  The clinical picture is not clear here. She denies any hx of viral illness in the last 6 months. Differential diagnosis includes grave's disease vs thyroiditis vs biotin induced lab abnormality.    - Based on her recent TFTs, will decrease her methimazole to 5 mg daily.  - Add on free T4 and TSI to today's labs  - Repeat TSH and free T4 in one month    Alopecia universalis:  She has severe alopecia. We explained her that alopecia can be be seen in untreated hyop- or hyperthyroidism. However in her case, the appearance of alopecia far precedes the development of abnormal TFTS by almost one year, making it unlikely to be the causative agent here. However, both graves disease and alopecia have autoimmune pathology and presence of one autoimmune disease increases the likelihood of another autoimmune disease.    S/p hysterectomy in 2015  On bio-identical hormones (testosterone, estrogen and progesterone) prescribed by a PA at [facility]    RTC in 3 months    The case was discussed with my " "attending, Dr. Singleton.    Marysol Dodd MD  PGY-4 Endocrine Fellow        HISTORY OF PRESENT ILLNESS  Lupe is a 51 yo F with PMH of alopecia universalis who presents for evaluation of abnormal thyroid labs.    She states she started losing her hair in early 2020 to the point that she had to shave her head in august 2020. She has lost her eyelashes, eyebrows and has absolutely no hair left on her body. She has seen dermatology, has had biopsy of the scalp, scalp steroid injections and finished a prednisone taper from 1/20 - 3/3, all without any improvement.    She has always thought that her alopecia is secondary to her thyroid illness. She also had frozen shoulder in 2019 that she also links with underlying thyroid problem. She reports extensive hx of thyroid problems on his father's side.  However her thyroid labs have always been normal since 2005.    She knows somebody who had alopecia and normal thyroid labs, yet started synthroid and her hair grew back. She asked a PA-c that she sees for her hormones for synthroid who checked her labs.  12/14/2020: TSH <0.01, fT4 2.12 and free T3 6.22. However she was on biotin at that time. She has never been on synthroid.  She was referred to [facility].    Labs repeated at [facility] (a few days after stopping biotin) showed persistent results.  12/24/2020: TSH <0.01, free T4 3.3, total T3 250, TRab <0.01.  She was started on methimazole 20 mg daily for a month, that was tapered to 10 mg daily that she is still taking.    US on 1/20/2021 showed heterogenous hyperemic thyroid gland, likely reflecting thyroiditis, without any nodules.    Labs on 3/1/2021 showed normal TSH at ULN 4.44 and free T4 0.81    She is s/p hysterectomy in 2015 for uterine mass and dysmenorrhea, was not on HRT. However she started seeing a PA at [facility] who started her on \"bioidentical hormones\", testosterone, estrogen and progesterone in aug 2020.    She has been having hot flashes for a year, " has always been on the constipated side. She reports palpitations, anxiety due to her alopecia, brittle nails and increased sweating for 1 year. Denies any tremor, neck swelling, compressive symptoms.    Denies any URI or viral infection in the last 6 months.    Reports eye dryness.    She has been on autoimmune diet.  Family hx is positive for hypothyroidism on father's side, parathyroid problem in paternal grandmother and lupus in her daughter.    She works as a . Lives with her boyfriend and BF's father.      REVIEW OF SYSTEMS    10 system ROS otherwise as per the HPI or negative    Past Medical History  History reviewed. No pertinent past medical history.    Medications  Current Outpatient Medications   Medication Sig Dispense Refill     Calcium Carb-Cholecalciferol (CALCIUM 1000 + D PO)        Cholecalciferol (VITAMIN D) 125 MCG (5000 UT) capsule        methimazole (TAPAZOLE) 10 MG tablet Take 10 mg by mouth daily       Multiple Vitamins-Minerals (ZINC PO) Take by mouth daily       NONFORMULARY Estrogen/Testosterone cream 4mg       NONFORMULARY Methyl B Complex       NONFORMULARY progesterone 150 mg capsule       Quercetin 250 MG TABS 500 mg       Multiple Vitamins-Minerals (ONE-A-DAY WITHIN) TABS        NONFORMULARY Apply 1 g topically estradiol 0.03 %, testosterone 0.01 % in versabase cream (vaginal)(tube)       NONFORMULARY NALTREXONE HCL, BULK,       NONFORMULARY POTASSIUM CITRATE ORAL       Prasterone, DHEA, (DHEA 50) 50 MG CAPS        Prasterone, DHEA, 10 MG CAPS        vitamin D2 (ERGOCALCIFEROL) 76997 units (1250 mcg) capsule            Active Diet Order  None      Allergies  Allergies   Allergen Reactions     Tramadol Itching     Sulfa Drugs Diarrhea       Family History  family history is not on file.    Social History  Social History     Tobacco Use     Smoking status: Former Smoker     Smokeless tobacco: Never Used   Substance Use Topics     Alcohol use: None     Drug use: None        Physical Exam  There were no vitals taken for this visit.  There is no height or weight on file to calculate BMI.  GENERAL :  In no apparent distress  GENERAL: Healthy, alert and no distress  EYES: Eyes grossly normal to inspection.  No discharge or erythema, or obvious scleral/conjunctival abnormalities.  RESP: No audible wheeze, cough, or visible cyanosis.  No visible retractions or increased work of breathing.    SKIN: Visible skin clear. No significant rash, abnormal pigmentation or lesions.  NEURO: Cranial nerves grossly intact.  Mentation and speech appropriate for age.  PSYCH: Mentation appears normal, affect normal/bright, judgement and insight intact, normal speech and appearance well-groomed.     DATA REVIEW  6/4/2020: FSH 11.8  8/12/2020: LH 8, DHEAS 99.8, testosterone 18  8/26/2020: salivary cortisol 8/26/20     thyroid 1/20/2021  Heterogeneous thyroid parenchyma with hyperemia may reflect changes of thyroiditis. No distinct thyroid nodules.    Attestation    I have reviewed chart, obtained history, and discussed management plan with the patient with the Endocrinology Fellow on March 15, 2021.  I agree with the assessment and plan of care as documented above.      Discussed and share the following pt instructions with the patient.   Patient Instructions   Lupe,    #1 hyperthyroidism -we noted that your thyroid blood work results have been in the hyperthyroid or overactive range in December 2020.  We see that based on those readings you have been started on methimazole treatment at an outside facility.  Blood work results for thyroid not available in January and February 2021 however recently done thyroid blood work results in March 2021 showed normal thyroid blood work results.  In fact, the TSH is high normal.  Results copied below.  What does these results mean?  What is underlying cause for hyperthyroidism?    We are considering two underlying mechanisms as a possible underlying cause for  hyperthyroidism.  #1 is a Graves' disease.  In order to assess this further we will going to be checking TSI or Graves' antibody.   It was not possible to add this blood work to the blood work already drawn today.  Therefore, please make an appointment to go to the lab to have your blood drawn at your convenience.  Here is the lab number to call and schedule an appointment. Lab scheduling to schedule at any [facility] lab locations: [phone]) select option 1  #2 possibility is thyroiditis or inflammation of the thyroid causing the overactive thyroid back in December 2020.  If Graves' antibody is negative and if subsequent thyroid blood work results indicate that your thyroid blood work continues to be within the normal limits then we can make a conclusion that the blood work back in December was secondary to thyroiditis.    The second question we addressed during your visit is if your hair loss and thyroid condition are related.  Your hair loss started before your thyroid abnormality therefore it is hard to say that thyroid is underlying cause for your hair loss.  However, there is a connection between alopecia areata and thyroid because both conditions can be caused by autoimmune disorder.  Both hyper and hypothyroidism can cause hair loss however, considering the extent of hair loss and considering the fact that the hair loss precedes the thyroid disorder; it is hard to establish causality at this point in time.    #3 based on the blood work results reduce methimazole to 5 mg daily from your current dose of 10 mg daily.    I will contact you once I have blood work results.  In the meantime if you have any questions please do not hesitate to reach out to me.          Nura Singleton MD  Staff Endocrinologist   Endocrinology and Metabolism  [facility]

## 2021-03-15 NOTE — NURSING NOTE
Dermatology Rooming Note    Lupe Deluna's goals for this visit include:   Chief Complaint   Patient presents with     Hair Loss     Lupe is here today for loss of all hair. She states she has lost all of her body hair.     Amber Abreu, [facility]

## 2021-03-16 ENCOUNTER — TELEPHONE (OUTPATIENT)
Dept: ENDOCRINOLOGY | Facility: CLINIC | Age: 51
End: 2021-03-16

## 2021-03-16 DIAGNOSIS — E05.90 HYPERTHYROIDISM: ICD-10-CM

## 2021-03-16 LAB — T4 FREE SERPL-MCNC: 0.76 NG/DL (ref 0.76–1.46)

## 2021-03-16 PROCEDURE — 84439 ASSAY OF FREE THYROXINE: CPT | Performed by: INTERNAL MEDICINE

## 2021-03-16 PROCEDURE — 99000 SPECIMEN HANDLING OFFICE-LAB: CPT | Performed by: INTERNAL MEDICINE

## 2021-03-16 PROCEDURE — 84445 ASSAY OF TSI GLOBULIN: CPT | Mod: 90 | Performed by: INTERNAL MEDICINE

## 2021-03-16 PROCEDURE — 36415 COLL VENOUS BLD VENIPUNCTURE: CPT | Performed by: INTERNAL MEDICINE

## 2021-03-16 NOTE — TELEPHONE ENCOUNTER
3/16 1st attempt.  Spoke with patient.  Patient was busy and could not schedule with me at that time.  Patient needs to schedule:    1)  A 3 month follow up visit (video) with Dr. Singleton around 6/15/21.    2)  Areli labs (to be scheduled and completed prior to visit with Dr. Singleton).    Please assist patient in scheduling when they call back.    Jennifer Fulton  Pediatric Specialty    [facility]

## 2021-03-16 NOTE — NURSING NOTE
Images were not received, writer called [facility] staff [facility] stated images will be pushed and a CD was mailed.   Chely Chan MA  Adult Endocrinology  [facility]

## 2021-03-17 LAB
SHBG SERPL-SCNC: 40 NMOL/L (ref 30–135)
TESTOST FREE SERPL-MCNC: 0.34 NG/DL (ref 0.11–0.58)
TESTOST SERPL-MCNC: 22 NG/DL (ref 8–60)

## 2021-03-17 NOTE — RESULT ENCOUNTER NOTE
Lupe,    We were able to add free T4 and thyroid-stimulating immunoglobulin [Graves' antibody] to the blood sample collected on 3/15/2021.  The free T4 is lower than the blood work results from 2 weeks ago.  These results supports that the hyperthyroidism could be secondary to thyroiditis.  The TSI also Graves' antibody result is still pending.  I will contact you once I have that result.  The result for TSI might take 7 days.  In the meantime, plan is as we have discussed at the time of your follow-up visit.    Please let me know if any questions in the meantime.    Nura Singleton MD

## 2021-03-18 ENCOUNTER — TELEPHONE (OUTPATIENT)
Dept: ENDOCRINOLOGY | Facility: CLINIC | Age: 51
End: 2021-03-18

## 2021-03-18 DIAGNOSIS — E03.9 HYPOTHYROIDISM: Primary | ICD-10-CM

## 2021-03-18 LAB
SELENIUM SERPL-MCNC: 197.8 UG/L (ref 23–190)
TSI SER-ACNC: <1 TSI INDEX
ZINC SERPL-MCNC: 196.2 UG/DL (ref 60–120)

## 2021-03-18 NOTE — TELEPHONE ENCOUNTER
[facility] Call Center    Phone Message    May a detailed message be left on voicemail: yes     Reason for Call: Pt called to schedule appt from referral from Dr. Oliver with Dr. Schulte.  Pt stated that these two providers work directly with each other and she'd prefer to schedule with Dr. Schulte for this reason.      Pt has already been seen by Dr. Singleton at our clinic.      Please review and call pt back to schedule.  Referral is for Hypothyroidism and specifically hair loss, per pt.      Action Taken: Message routed to:  [facility] endo    Travel Screening: Not Applicable

## 2021-03-22 DIAGNOSIS — E05.90 HYPERTHYROIDISM: Primary | ICD-10-CM

## 2021-03-22 NOTE — PROGRESS NOTES
"The following message sent to patient via OpenDrive.  Follow-up free T4 ordered.  ENDO THYROID LABS-[facility] Latest Ref Rng & Units 3/16/2021   T4 FREE 0.76 - 1.46 ng/dL 0.76   THYROID STIM IMMUNOG <=1.3 TSI index <1.0   \"  Me  to Lupe Deluna          9:10 AM  Lupe,     Thank you for the update.  Absolutely; very understandable.     #1 hyperthyroidism -we noted that your thyroid blood work results have been in the hyperthyroid or overactive range in December 2020.  We see that based on those readings you have been started on methimazole treatment at an outside facility.  Blood work results for thyroid not available in January and February 2021 however recently done thyroid blood work results in March 2021 showed normal thyroid blood work results.  In fact, the TSH is high normal.  Results copied below.  What does these results mean?  What is underlying cause for hyperthyroidism?     We were considering two underlying mechanisms as a possible underlying cause for hyperthyroidism.  #1 is a Graves' disease.  In order to assess for this condition will check TSI antibody which came back normal or negative.  This makes the possibility of Graves' disease less likely.     The second possibility as an underlying causes thyroiditis.  As we have discussed, thyroiditis is a condition whereby there is inflammation of the thyroid gland and rapid release of thyroid hormones the system.  This condition is self-limited and gets better with time.     Given the Graves' antibody which is negative, and the rapid correction of the thyroid hormones; the previously noted hyperthyroidism could be secondary to thyroiditis.     What we do with methimazole right now?.  I think it might be reasonable to stop methimazole at this time and check thyroid hormones in 1-2 weeks.  I have ordered a free T4 level to be followed up in 1 to 2 weeks.  Please let me know if any questions.        Thank you,   Nura Singleton MD\"    "

## 2021-03-23 NOTE — TELEPHONE ENCOUNTER
Per amador Chavez to schedule with Dr. Schulte as a new patient. OK to schedule in any open 60 minute YEMI/new slot.

## 2021-03-30 NOTE — TELEPHONE ENCOUNTER
RECORDS RECEIVED FROM: INTERNAL   DATE RECEIVED: 03/30/2021   NOTES (FOR ALL VISITS) STATUS DETAILS   OFFICE NOTES from referring provider Internal 03/15/2021   OFFICE NOTES from other specialist Internal 03/15/2021   ED NOTES N/A    OPERATIVE REPORT  (thyroid, pituitary, adrenal, parathyroid) N/A    MEDICATION LIST Internal    IMAGING      DEXASCAN N/A    MRI (BRAIN) N/A    XR (Chest) N/A    CT (HEAD/NECK/CHEST/ABDOMEN) N/A    NUCLEAR  N/A    ULTRASOUND (HEAD/NECK) Received 01/20/2021   LABS     DIABETES: HBGA1C, CREATININE, FASTING LIPIDS, MICROALBUMIN URINE, POTASSIUM, TSH, T4    THYROID: TSH, T4, CBC, THYRODLONULIN, TOTAL T3, FREE T4, CALCITONIN, CEA Care Everywhere   03/01/2021  12/24/2020  03/22/2021  03/16/2021  03/15/2021  01/20/2021

## 2021-04-02 ENCOUNTER — TELEPHONE (OUTPATIENT)
Dept: ENDOCRINOLOGY | Facility: CLINIC | Age: 51
End: 2021-04-02

## 2021-04-02 DIAGNOSIS — E05.90 HYPERTHYROIDISM: ICD-10-CM

## 2021-04-02 LAB — T4 FREE SERPL-MCNC: 0.85 NG/DL (ref 0.76–1.46)

## 2021-04-02 PROCEDURE — 36415 COLL VENOUS BLD VENIPUNCTURE: CPT | Performed by: INTERNAL MEDICINE

## 2021-04-02 PROCEDURE — 84439 ASSAY OF FREE THYROXINE: CPT | Performed by: INTERNAL MEDICINE

## 2021-04-02 NOTE — TELEPHONE ENCOUNTER
[facility] Call Center    Phone Message    May a detailed message be left on voicemail: yes     Reason for Call: Order(s): Other:   Pt sched for labs today at 12p. ords in system only for t4. Does provider also want tsh? Need entered asap so patient can have done today. pls call pt to inform.    Action Taken: Message routed to:  Adult Clinics: Endocrinology p [phone]    Travel Screening: Not Applicable

## 2021-04-02 NOTE — TELEPHONE ENCOUNTER
Per 3/22/21 result note from Dr. Singleton to patient:  I think it might be reasonable to stop methimazole at this time and check thyroid hormones in 1-2 weeks.  I have ordered a free T4 level to be followed up in 1 to 2 weeks.  Please let me know if any questions.        Patient advised. Patient verbalizes understanding and agrees to plan.       Dinora Romero, RN  Endocrine Care Coordinator  [facility]

## 2021-04-07 NOTE — RESULT ENCOUNTER NOTE
Lupe,     Your thyroid blood work result -free T4 is within the normal limits.    Thank you,     Nura Singleton MD

## 2021-04-08 ENCOUNTER — MYC MEDICAL ADVICE (OUTPATIENT)
Dept: ENDOCRINOLOGY | Facility: CLINIC | Age: 51
End: 2021-04-08

## 2021-04-12 NOTE — TELEPHONE ENCOUNTER
"\"My TSH was 1.39 uIU/mL and T4,Free was 1.00 ng/dl - \" 4/8/21  Pt has upcoming appointment this week at endocrinology clinic. Further discussion/work up at that time.   "

## 2021-04-15 NOTE — PROGRESS NOTES
"Lupe Deluna  is being evaluated via a billable video visit.      How would you like to obtain your AVS? Frandy  For the video visit, send the invitation by: Frandy  Will anyone else be joining your video visit? Carla Andrade is a 50 year old female presents today for Video Visit (thyroid)  .  This is a virtual visit conducted by Leslie.  Start time 210, stop time 250, chart review, documentation, coordination of care, 20 minutes.  Total time spent the day of the encounter: 60 minutes    HPI  #1 thyroid concerns  The patient had a history of hair loss starting in April 2020.  In December 2020, she was noted to have a TSH of 0.01, free T3 of 6.2, free T4 2.1.  Per patient report, she was on biotin at that time but stopped her biotin for about 2 days before hand.  She denies any history of scan or uptake.  She did have thyroid ultrasound which did not identify any nodules but was noted to be quite heterogeneous.  She did not have any scan or uptake.  She was presumptively put on methimazole at 10 mg daily.  She was seen by Dr. Thapa at [facility] (January 2021, Feb 2021) and then decided to transfer her care to the [facility].  She was seen by my colleague Dr. Singleton (March 2021) for which the decision was to follow her thyroid function test, taper her methimazole, and eventually stopped her methimazole completely in March 2021.    The patient reports that despite normalization of her TFTs and stopping the methimazole, she continues to have hair loss and issues with fatigue and memory.  She reports that when she was on methimazole, perhaps her \"shoulder pain\" was better.  She does report that her shoulder pain has appeared to be worse since stopping the methimazole.  Of note, her TSI and thyrotropin receptor antibodies were negative.    #2 fatigue  The patient reports a history of fatigue.  She goes to sleep generally around 9 PM and wakes up around 5 AM.  She reports that she sometimes " "awakens 1-2 times per night.  She reports that she is tired when she is awake.  She denies snoring.  She is never had a sleep evaluation.  Of note, she reports that she recently lost 30 pounds and currently weighs about 150 pounds.    #3 hair loss  This is her most pressing concern.  This started in roughly April 2020.  She has lost her hair completely from her body (scalp, eyelashes, eyebrows, axillary region).  She has been evaluated by 2 dermatologist.  She reports receiving steroids in January 2021 (60 mg daily-tapering off over 6 weeks) which did not clearly improve her hair loss.  She was also temporarily started on methimazole (see issue #1) which also did not help her hair loss.    #4 poor memory  The patient reports a history of poor memory for the past year.  This appears to be short-term memory.  She reports needing lists and reminders to make sure she gets her work done.  This has not interfered with her ability to work although she feels that she needs more \"checks\" than usual.    Of note, the patient reports a history of hysterectomy in 2015 with her ovaries remaining.  She reports a history of bioidentical hormone use (estrogen, testosterone, done by Stella Resendiz, nurse practitioner) which did not clearly seem to help her memory or her energy levels.  However she stopped this as of March 2021.    Past Medical History  No past medical history on file.    Allergies  Allergies   Allergen Reactions     Tramadol Itching     Sulfa Drugs Diarrhea     Medications  Current Outpatient Medications   Medication Sig Dispense Refill     Calcium Carb-Cholecalciferol (CALCIUM 1000 + D PO)        Cholecalciferol (VITAMIN D) 125 MCG (5000 UT) capsule        Multiple Vitamins-Minerals (ONE-A-DAY WITHIN) TABS        Multiple Vitamins-Minerals (ZINC PO) Take by mouth daily       NONFORMULARY Estrogen/Testosterone cream 4mg       NONFORMULARY Apply 1 g topically estradiol 0.03 %, testosterone 0.01 % in versabase cream " "(vaginal)(tube)       NONFORMULARY Methyl B Complex       NONFORMULARY NALTREXONE HCL, BULK,       NONFORMULARY POTASSIUM CITRATE ORAL       NONFORMULARY progesterone 150 mg capsule       Prasterone, DHEA, (DHEA 50) 50 MG CAPS        Prasterone, DHEA, 10 MG CAPS        Quercetin 250 MG TABS 500 mg       vitamin D2 (ERGOCALCIFEROL) 92772 units (1250 mcg) capsule        Family History  family history is not on file.  Social History  Social History     Socioeconomic History     Marital status: Single     Spouse name: Not on file     Number of children: Not on file     Years of education: Not on file     Highest education level: Not on file   Occupational History     Not on file   Social Needs     Financial resource strain: Not on file     Food insecurity     Worry: Not on file     Inability: Not on file     Transportation needs     Medical: Not on file     Non-medical: Not on file   Tobacco Use     Smoking status: Former Smoker     Smokeless tobacco: Never Used   Substance and Sexual Activity     Alcohol use: Not on file     Drug use: Not on file     Sexual activity: Not on file   Lifestyle     Physical activity     Days per week: Not on file     Minutes per session: Not on file     Stress: Not on file   Relationships     Social connections     Talks on phone: Not on file     Gets together: Not on file     Attends Buddhism service: Not on file     Active member of club or organization: Not on file     Attends meetings of clubs or organizations: Not on file     Relationship status: Not on file     Intimate partner violence     Fear of current or ex partner: Not on file     Emotionally abused: Not on file     Physically abused: Not on file     Forced sexual activity: Not on file   Other Topics Concern     Parent/sibling w/ CABG, MI or angioplasty before 65F 55M? Not Asked   Social History Narrative     Not on file       ROS  Per HPI      Physical Exam  GENERAL: Healthy, alert and no distress\",\"EYES: Eyes grossly normal " "to inspection.  No discharge or erythema, or obvious scleral/conjunctival abnormalities.\",\"RESP: No audible wheeze, cough, or visible cyanosis.  No visible retractions or increased work of breathing.  \",\"SKIN: Visible skin clear. No significant rash, abnormal pigmentation or lesions. Noted alopecia\",\"NEURO: Cranial nerves grossly intact.  Mentation and speech appropriate for age.\",\"PSYCH: Mentation appears normal, affect normal/bright, judgement and insight intact, normal speech and appearance well-groomed.    RESULTS  Component      Latest Ref Rng & Units 3/15/2021 3/16/2021 4/2/2021   WBC      4.0 - 11.0 10e9/L 8.2     RBC Count      3.8 - 5.2 10e12/L 5.27 (H)     Hemoglobin      11.7 - 15.7 g/dL 13.7     Hematocrit      35.0 - 47.0 % 43.8     MCV      78 - 100 fl 83     MCH      26.5 - 33.0 pg 26.0 (L)     MCHC      31.5 - 36.5 g/dL 31.3 (L)     RDW      10.0 - 15.0 % 17.9 (H)     Platelet Count      150 - 450 10e9/L 357     Diff Method       Automated Method     % Neutrophils      % 61.2     % Lymphocytes      % 27.3     % Monocytes      % 6.7     % Eosinophils      % 3.5     % Basophils      % 0.9     % Immature Granulocytes      % 0.4     Nucleated RBCs      0 /100 0     Absolute Neutrophil      1.6 - 8.3 10e9/L 5.0     Absolute Lymphocytes      0.8 - 5.3 10e9/L 2.2     Absolute Monocytes      0.0 - 1.3 10e9/L 0.6     Absolute Eosinophils      0.0 - 0.7 10e9/L 0.3     Absolute Basophils      0.0 - 0.2 10e9/L 0.1     Abs Immature Granulocytes      0 - 0.4 10e9/L 0.0     Absolute Nucleated RBC       0.0     Sodium      133 - 144 mmol/L 143     Potassium      3.4 - 5.3 mmol/L 4.0     Chloride      94 - 109 mmol/L 109     Carbon Dioxide      20 - 32 mmol/L 28     Anion Gap      3 - 14 mmol/L 6     Glucose      70 - 99 mg/dL 105 (H)     Urea Nitrogen      7 - 30 mg/dL 9     Creatinine      0.52 - 1.04 mg/dL 0.74     GFR Estimate      >60 mL/min/1.73:m2 >90     GFR Estimate If Black      >60 mL/min/1.73:m2 >90   "   Calcium      8.5 - 10.1 mg/dL 9.8     Bilirubin Total      0.2 - 1.3 mg/dL 0.4     Albumin      3.4 - 5.0 g/dL 4.1     Protein Total      6.8 - 8.8 g/dL 7.3     Alkaline Phosphatase      40 - 150 U/L 98     ALT      0 - 50 U/L 40     AST      0 - 45 U/L 20     Testosterone Total      8 - 60 ng/dL 22     Sex Hormone Binding Globulin      30 - 135 nmol/L 40     Free Testosterone Calculated      0.11 - 0.58 ng/dL 0.34     Iron      35 - 180 ug/dL 82     Iron Binding Cap      240 - 430 ug/dL 310     Iron Saturation Index      15 - 46 % 27     FSH      IU/L 43.8     Selenium      23.0 - 190.0 ug/L 197.8 (H)     Vitamin D Deficiency screening      20 - 75 ug/L 119 (H)     Zinc      60.0 - 120.0 ug/dL 196.2 (H)     DHEA Sulfate      35 - 430 ug/dL 172     Ferritin      8 - 252 ng/mL 214     Thyroid Stim Immunog      <=1.3 TSI index  <1.0    T4 Free      0.76 - 1.46 ng/dL  0.76 0.85       ASSESSMENT:    #1 thyroid concerns  It appears that the patient may have had a history of hyperthyroidism (question thyroiditis) of which is difficult to determine the etiology given that there was no scan, uptake, as well as negative antibody test results.  At any rate, she had been on methimazole and has tapered off and her current TFTs are normal.  Per patient request, we will also check TPO and antithyroglobulin antibody (previous TSI and TRAB) were negative and if these are otherwise unremarkable, we will plan on likely regularly scheduled TFTs to determine trend of lab results.  I certainly think her thyroid is not a reason for her symptoms as noted below.  However I also think watching how her thyroid function evolves over time would be reasonable.    #2 fatigue  Etiology uncertain.  Will have patient proceed with evaluation as noted below.  Of note, the patient reports poor sleep which certainly is not helping the issue.  Sleep referral made.    #3 hair loss  Uncertain of reason.  Patient has dermatology following.  This pattern  of hair loss is not typical of hyperthyroidism.    #4 poor memory  Etiology uncertain.  Will have patient proceed with evaluation as noted below.  Of note, the patient reports poor sleep which certainly is not helping the issue.  Sleep referral made.  Of note, the patient is likely postmenopausal (we will check labs) although replacement with bioidentical hormones did not help her symptoms (August 2020 to March 2021)    Patient to do labs below and return in 1 month.        Orders Placed This Encounter   Procedures     Thyroid peroxidase antibody     TSH     Tissue transglutaminase betzy IgA and IgG     T4 free     T3 total     Anti thyroglobulin antibody     Follicle stimulating hormone     Lutropin     25 Hydroxyvitamin D2 and D3     Hemoglobin A1c     Cortisol     SLEEP EVALUATION & MANAGEMENT REFERRAL - ADULT -[facility] - [facility]  [phone] (Age 3 mo - 18yr)     This is a virtual visit conducted by Leslie.  Start time 210, stop time 250, chart review, documentation, coordination of care, 20 minutes.  Total time spent the day of the encounter: 60 minutes

## 2021-04-16 ENCOUNTER — PRE VISIT (OUTPATIENT)
Dept: ENDOCRINOLOGY | Facility: CLINIC | Age: 51
End: 2021-04-16

## 2021-04-16 ENCOUNTER — VIRTUAL VISIT (OUTPATIENT)
Dept: ENDOCRINOLOGY | Facility: CLINIC | Age: 51
End: 2021-04-16
Attending: DERMATOLOGY
Payer: COMMERCIAL

## 2021-04-16 DIAGNOSIS — R53.83 OTHER FATIGUE: ICD-10-CM

## 2021-04-16 DIAGNOSIS — E05.90 HYPERTHYROIDISM: Primary | ICD-10-CM

## 2021-04-16 PROCEDURE — 99215 OFFICE O/P EST HI 40 MIN: CPT | Mod: GT | Performed by: INTERNAL MEDICINE

## 2021-04-16 NOTE — LETTER
"4/16/2021       RE: Lupe Deluna  [address] [facility] [address]     Dear Colleague,    Thank you for referring your patient, Lupe Deluna, to the [facility] [place] at [facility]. Please see a copy of my visit note below.        Lupe is a 50 year old female presents today for Video Visit (thyroid)  .  This is a virtual visit conducted by Leslie.  Start time 210, stop time 250, chart review, documentation, coordination of care, 20 minutes.  Total time spent the day of the encounter: 60 minutes    HPI  #1 thyroid concerns  The patient had a history of hair loss starting in April 2020.  In December 2020, she was noted to have a TSH of 0.01, free T3 of 6.2, free T4 2.1.  Per patient report, she was on biotin at that time but stopped her biotin for about 2 days before hand.  She denies any history of scan or uptake.  She did have thyroid ultrasound which did not identify any nodules but was noted to be quite heterogeneous.  She did not have any scan or uptake.  She was presumptively put on methimazole at 10 mg daily.  She was seen by Dr. Thapa at [facility] (January 2021, Feb 2021) and then decided to transfer her care to the [facility].  She was seen by my colleague Dr. Singleton (March 2021) for which the decision was to follow her thyroid function test, taper her methimazole, and eventually stopped her methimazole completely in March 2021.    The patient reports that despite normalization of her TFTs and stopping the methimazole, she continues to have hair loss and issues with fatigue and memory.  She reports that when she was on methimazole, perhaps her \"shoulder pain\" was better.  She does report that her shoulder pain has appeared to be worse since stopping the methimazole.  Of note, her TSI and thyrotropin receptor antibodies were negative.    #2 fatigue  The patient reports a history of " "fatigue.  She goes to sleep generally around 9 PM and wakes up around 5 AM.  She reports that she sometimes awakens 1-2 times per night.  She reports that she is tired when she is awake.  She denies snoring.  She is never had a sleep evaluation.  Of note, she reports that she recently lost 30 pounds and currently weighs about 150 pounds.    #3 hair loss  This is her most pressing concern.  This started in roughly April 2020.  She has lost her hair completely from her body (scalp, eyelashes, eyebrows, axillary region).  She has been evaluated by 2 dermatologist.  She reports receiving steroids in January 2021 (60 mg daily-tapering off over 6 weeks) which did not clearly improve her hair loss.  She was also temporarily started on methimazole (see issue #1) which also did not help her hair loss.    #4 poor memory  The patient reports a history of poor memory for the past year.  This appears to be short-term memory.  She reports needing lists and reminders to make sure she gets her work done.  This has not interfered with her ability to work although she feels that she needs more \"checks\" than usual.    Of note, the patient reports a history of hysterectomy in 2015 with her ovaries remaining.  She reports a history of bioidentical hormone use (estrogen, testosterone, done by Stella Resendiz, nurse practitioner) which did not clearly seem to help her memory or her energy levels.  However she stopped this as of March 2021.    Past Medical History  No past medical history on file.    Allergies  Allergies   Allergen Reactions     Tramadol Itching     Sulfa Drugs Diarrhea     Medications  Current Outpatient Medications   Medication Sig Dispense Refill     Calcium Carb-Cholecalciferol (CALCIUM 1000 + D PO)        Cholecalciferol (VITAMIN D) 125 MCG (5000 UT) capsule        Multiple Vitamins-Minerals (ONE-A-DAY WITHIN) TABS        Multiple Vitamins-Minerals (ZINC PO) Take by mouth daily       NONFORMULARY Estrogen/Testosterone " cream 4mg       NONFORMULARY Apply 1 g topically estradiol 0.03 %, testosterone 0.01 % in versabase cream (vaginal)(tube)       NONFORMULARY Methyl B Complex       NONFORMULARY NALTREXONE HCL, BULK,       NONFORMULARY POTASSIUM CITRATE ORAL       NONFORMULARY progesterone 150 mg capsule       Prasterone, DHEA, (DHEA 50) 50 MG CAPS        Prasterone, DHEA, 10 MG CAPS        Quercetin 250 MG TABS 500 mg       vitamin D2 (ERGOCALCIFEROL) 09178 units (1250 mcg) capsule        Family History  family history is not on file.  Social History  Social History     Socioeconomic History     Marital status: Single     Spouse name: Not on file     Number of children: Not on file     Years of education: Not on file     Highest education level: Not on file   Occupational History     Not on file   Social Needs     Financial resource strain: Not on file     Food insecurity     Worry: Not on file     Inability: Not on file     Transportation needs     Medical: Not on file     Non-medical: Not on file   Tobacco Use     Smoking status: Former Smoker     Smokeless tobacco: Never Used   Substance and Sexual Activity     Alcohol use: Not on file     Drug use: Not on file     Sexual activity: Not on file   Lifestyle     Physical activity     Days per week: Not on file     Minutes per session: Not on file     Stress: Not on file   Relationships     Social connections     Talks on phone: Not on file     Gets together: Not on file     Attends Holiness service: Not on file     Active member of club or organization: Not on file     Attends meetings of clubs or organizations: Not on file     Relationship status: Not on file     Intimate partner violence     Fear of current or ex partner: Not on file     Emotionally abused: Not on file     Physically abused: Not on file     Forced sexual activity: Not on file   Other Topics Concern     Parent/sibling w/ CABG, MI or angioplasty before 65F 55M? Not Asked   Social History Narrative     Not on file  "      ROS  Per HPI      Physical Exam  GENERAL: Healthy, alert and no distress\",\"EYES: Eyes grossly normal to inspection.  No discharge or erythema, or obvious scleral/conjunctival abnormalities.\",\"RESP: No audible wheeze, cough, or visible cyanosis.  No visible retractions or increased work of breathing.  \",\"SKIN: Visible skin clear. No significant rash, abnormal pigmentation or lesions. Noted alopecia\",\"NEURO: Cranial nerves grossly intact.  Mentation and speech appropriate for age.\",\"PSYCH: Mentation appears normal, affect normal/bright, judgement and insight intact, normal speech and appearance well-groomed.    RESULTS  Component      Latest Ref Rng & Units 3/15/2021 3/16/2021 4/2/2021   WBC      4.0 - 11.0 10e9/L 8.2     RBC Count      3.8 - 5.2 10e12/L 5.27 (H)     Hemoglobin      11.7 - 15.7 g/dL 13.7     Hematocrit      35.0 - 47.0 % 43.8     MCV      78 - 100 fl 83     MCH      26.5 - 33.0 pg 26.0 (L)     MCHC      31.5 - 36.5 g/dL 31.3 (L)     RDW      10.0 - 15.0 % 17.9 (H)     Platelet Count      150 - 450 10e9/L 357     Diff Method       Automated Method     % Neutrophils      % 61.2     % Lymphocytes      % 27.3     % Monocytes      % 6.7     % Eosinophils      % 3.5     % Basophils      % 0.9     % Immature Granulocytes      % 0.4     Nucleated RBCs      0 /100 0     Absolute Neutrophil      1.6 - 8.3 10e9/L 5.0     Absolute Lymphocytes      0.8 - 5.3 10e9/L 2.2     Absolute Monocytes      0.0 - 1.3 10e9/L 0.6     Absolute Eosinophils      0.0 - 0.7 10e9/L 0.3     Absolute Basophils      0.0 - 0.2 10e9/L 0.1     Abs Immature Granulocytes      0 - 0.4 10e9/L 0.0     Absolute Nucleated RBC       0.0     Sodium      133 - 144 mmol/L 143     Potassium      3.4 - 5.3 mmol/L 4.0     Chloride      94 - 109 mmol/L 109     Carbon Dioxide      20 - 32 mmol/L 28     Anion Gap      3 - 14 mmol/L 6     Glucose      70 - 99 mg/dL 105 (H)     Urea Nitrogen      7 - 30 mg/dL 9     Creatinine      0.52 - 1.04 mg/dL " 0.74     GFR Estimate      >60 mL/min/1.73:m2 >90     GFR Estimate If Black      >60 mL/min/1.73:m2 >90     Calcium      8.5 - 10.1 mg/dL 9.8     Bilirubin Total      0.2 - 1.3 mg/dL 0.4     Albumin      3.4 - 5.0 g/dL 4.1     Protein Total      6.8 - 8.8 g/dL 7.3     Alkaline Phosphatase      40 - 150 U/L 98     ALT      0 - 50 U/L 40     AST      0 - 45 U/L 20     Testosterone Total      8 - 60 ng/dL 22     Sex Hormone Binding Globulin      30 - 135 nmol/L 40     Free Testosterone Calculated      0.11 - 0.58 ng/dL 0.34     Iron      35 - 180 ug/dL 82     Iron Binding Cap      240 - 430 ug/dL 310     Iron Saturation Index      15 - 46 % 27     FSH      IU/L 43.8     Selenium      23.0 - 190.0 ug/L 197.8 (H)     Vitamin D Deficiency screening      20 - 75 ug/L 119 (H)     Zinc      60.0 - 120.0 ug/dL 196.2 (H)     DHEA Sulfate      35 - 430 ug/dL 172     Ferritin      8 - 252 ng/mL 214     Thyroid Stim Immunog      <=1.3 TSI index  <1.0    T4 Free      0.76 - 1.46 ng/dL  0.76 0.85       ASSESSMENT:    #1 thyroid concerns  It appears that the patient may have had a history of hyperthyroidism (question thyroiditis) of which is difficult to determine the etiology given that there was no scan, uptake, as well as negative antibody test results.  At any rate, she had been on methimazole and has tapered off and her current TFTs are normal.  Per patient request, we will also check TPO and antithyroglobulin antibody (previous TSI and TRAB) were negative and if these are otherwise unremarkable, we will plan on likely regularly scheduled TFTs to determine trend of lab results.  I certainly think her thyroid is not a reason for her symptoms as noted below.  However I also think watching how her thyroid function evolves over time would be reasonable.    #2 fatigue  Etiology uncertain.  Will have patient proceed with evaluation as noted below.  Of note, the patient reports poor sleep which certainly is not helping the issue.   Sleep referral made.    #3 hair loss  Uncertain of reason.  Patient has dermatology following.  This pattern of hair loss is not typical of hyperthyroidism.    #4 poor memory  Etiology uncertain.  Will have patient proceed with evaluation as noted below.  Of note, the patient reports poor sleep which certainly is not helping the issue.  Sleep referral made.  Of note, the patient is likely postmenopausal (we will check labs) although replacement with bioidentical hormones did not help her symptoms (August 2020 to March 2021)    Patient to do labs below and return in 1 month.        Orders Placed This Encounter   Procedures     Thyroid peroxidase antibody     TSH     Tissue transglutaminase betzy IgA and IgG     T4 free     T3 total     Anti thyroglobulin antibody     Follicle stimulating hormone     Lutropin     25 Hydroxyvitamin D2 and D3     Hemoglobin A1c     Cortisol     SLEEP EVALUATION & MANAGEMENT REFERRAL - ADULT -[facility]  [phone] (Age 3 mo - 18yr)     This is a virtual visit conducted by Leslie.  Start time 210, stop time 250, chart review, documentation, coordination of care, 20 minutes.  Total time spent the day of the encounter: 60 minutes        Again, thank you for allowing me to participate in the care of your patient.      Sincerely,    Yolanda Schulte MD

## 2021-04-21 DIAGNOSIS — E05.90 HYPERTHYROIDISM: ICD-10-CM

## 2021-04-21 DIAGNOSIS — R53.83 OTHER FATIGUE: ICD-10-CM

## 2021-04-21 LAB
CORTIS SERPL-MCNC: 7.4 UG/DL (ref 4–22)
FSH SERPL-ACNC: 50.4 IU/L
HBA1C MFR BLD: 6 % (ref 0–5.6)
LH SERPL-ACNC: 31.5 IU/L
T3 SERPL-MCNC: 142 NG/DL (ref 60–181)
T4 FREE SERPL-MCNC: 0.98 NG/DL (ref 0.76–1.46)
TSH SERPL DL<=0.005 MIU/L-ACNC: 1.23 MU/L (ref 0.4–4)

## 2021-04-21 PROCEDURE — 82306 VITAMIN D 25 HYDROXY: CPT | Performed by: INTERNAL MEDICINE

## 2021-04-21 PROCEDURE — 83516 IMMUNOASSAY NONANTIBODY: CPT | Performed by: INTERNAL MEDICINE

## 2021-04-21 PROCEDURE — 86376 MICROSOMAL ANTIBODY EACH: CPT | Performed by: INTERNAL MEDICINE

## 2021-04-21 PROCEDURE — 36415 COLL VENOUS BLD VENIPUNCTURE: CPT | Performed by: INTERNAL MEDICINE

## 2021-04-21 PROCEDURE — 83036 HEMOGLOBIN GLYCOSYLATED A1C: CPT | Performed by: INTERNAL MEDICINE

## 2021-04-21 PROCEDURE — 83001 ASSAY OF GONADOTROPIN (FSH): CPT | Performed by: INTERNAL MEDICINE

## 2021-04-21 PROCEDURE — 84439 ASSAY OF FREE THYROXINE: CPT | Performed by: INTERNAL MEDICINE

## 2021-04-21 PROCEDURE — 83002 ASSAY OF GONADOTROPIN (LH): CPT | Performed by: INTERNAL MEDICINE

## 2021-04-21 PROCEDURE — 84443 ASSAY THYROID STIM HORMONE: CPT | Performed by: INTERNAL MEDICINE

## 2021-04-21 PROCEDURE — 82533 TOTAL CORTISOL: CPT | Performed by: INTERNAL MEDICINE

## 2021-04-21 PROCEDURE — 84480 ASSAY TRIIODOTHYRONINE (T3): CPT | Performed by: INTERNAL MEDICINE

## 2021-04-21 PROCEDURE — 86800 THYROGLOBULIN ANTIBODY: CPT | Performed by: INTERNAL MEDICINE

## 2021-04-21 NOTE — LETTER
Patient:  Lupe Deluna  :   1970  MRN:     [id number]        Ms.Lupe VALENZUELA [facility]  [address] [facility] [address]        2021    Dear Lupe    Here are your lab results.  This is most significant for a slightly higher hemoglobin A1c (you do not have diabetes), normal thyroid, negative thyroid antibodies, negative measure for celiac disease, and normal vitamin D.  Your cortisol level was a little lower at 7.4, so we could consider a cortisol stimulation test.  If this is low, it could be a reason for your fatigue.  However I suspect it will be normal.  I think it is worth doing. If you don't get the details of this test, please let me know.    If you have any questions, please feel free to contact my nurse at [phone] select option #3 for triage nurse  or  option #1 for scheduling related questions.    Regards    Yolanda Schulte MD    Resulted Orders   Cortisol   Result Value Ref Range    Cortisol Serum 7.4 4 - 22 ug/dL      Comment:      8 AM Cortisol Reference Range = 4-22 ug/dL  4 PM Cortisol Reference Range = 3-17 ug/dL     Thyroid peroxidase antibody   Result Value Ref Range    Thyroid Peroxidase Antibody <10 <35 IU/mL   TSH   Result Value Ref Range    TSH 1.23 0.40 - 4.00 mU/L   Tissue transglutaminase betzy IgA and IgG   Result Value Ref Range    Tissue Transglutaminase Antibody IgA <1 <7 U/mL      Comment:      Negative  The tTG-IgA assay has limited utility for patients with decreased levels of   IgA. Screening for celiac disease should include IgA testing to rule out   selective IgA deficiency and to guide selection and interpretation of   serological testing. tTG-IgG testing may be positive in celiac disease   patients with IgA deficiency.      Tissue Transglutaminase Betzy IgG <1 <7 U/mL      Comment:      Negative   T4 free   Result Value Ref Range    T4 Free 0.98 0.76 - 1.46 ng/dL   T3 total   Result Value Ref Range    Triiodothyronine (T3) 142 60 - 181 ng/dL   Anti  thyroglobulin antibody   Result Value Ref Range    Thyroglobulin Antibody <20 <40 IU/mL   Follicle stimulating hormone   Result Value Ref Range    FSH 50.4 IU/L      Comment:      FSH Reference Range  Female: Follicular      2.5-10.2          Mid-cycle       3.4-33.4          Luteal          1.5-9.1          Postmenopausal  23.0-116.3     Lutropin   Result Value Ref Range    Lutropin 31.5 IU/L      Comment:      LH Reference Range  Female: Follicular      1.9-12.5          Mid-cycle       8.7-76.3          Luteal          0.5-16.9          Postmenopausal  15.9-54.0     25 Hydroxyvitamin D2 and D3   Result Value Ref Range    25 OH Vit D2 <5 ug/L    25 OH Vit D3 67 ug/L    25 OH Vit D total <72 20 - 75 ug/L      Comment:      Season, race, dietary intake, and treatment affect the concentration of   25-hydroxy-Vitamin D. Values may decrease during winter months and increase   during summer months. Values 20-29 ug/L may indicate Vitamin D insufficiency   and values <20 ug/L may indicate Vitamin D deficiency.  This test was developed and its performance characteristics determined by the   [facility].   It has not been cleared or approved by the FDA. The laboratory is regulated   under CLIA as qualified to perform high-complexity testing. This test is used   for clinical purposes. It should not be regarded as investigational or for   research.     Hemoglobin A1c   Result Value Ref Range    Hemoglobin A1C 6.0 (H) 0 - 5.6 %      Comment:      Normal <5.7% Prediabetes 5.7-6.4%  Diabetes 6.5% or higher - adopted from ADA   consensus guidelines.         First Floor Lab

## 2021-04-22 LAB
THYROGLOB AB SERPL IA-ACNC: <20 IU/ML (ref 0–40)
THYROPEROXIDASE AB SERPL-ACNC: <10 IU/ML
TTG IGA SER-ACNC: <1 U/ML
TTG IGG SER-ACNC: <1 U/ML

## 2021-04-23 LAB
DEPRECATED CALCIDIOL+CALCIFEROL SERPL-MC: <72 UG/L (ref 20–75)
VITAMIN D2 SERPL-MCNC: <5 UG/L
VITAMIN D3 SERPL-MCNC: 67 UG/L

## 2021-04-27 ENCOUNTER — TELEPHONE (OUTPATIENT)
Dept: ENDOCRINOLOGY | Facility: CLINIC | Age: 51
End: 2021-04-27

## 2021-04-27 DIAGNOSIS — E05.90 HYPERTHYROIDISM: Primary | ICD-10-CM

## 2021-04-27 DIAGNOSIS — R53.83 OTHER FATIGUE: ICD-10-CM

## 2021-04-27 NOTE — PROGRESS NOTES
Call patient.  Patient willing to do ACTH stim test.  We will arrange.  Patient also to see sleep medicine.  Patient willing to hold off on medication treatment for her elevated hemoglobin A1c.  We will recheck TFTs and hemoglobin A1c with return visit in July 2021    -  Dear Lupe    Here are your lab results.  This is most significant for a slightly higher hemoglobin A1c (you do not have diabetes), normal thyroid, negative thyroid antibodies, negative measure for celiac disease, and normal vitamin D.  Your cortisol level was a little lower at 7.4, so we could consider a cortisol stimulation test.  If this is low, it could be a reason for your fatigue.  However I suspect it will be normal.  I think it is worth doing. If you don't get the details of this test, please let me know.    If you have any questions, please feel free to contact my nurse at [phone] select option #3 for triage nurse  or  option #1 for scheduling related questions.    Regards    Yolanda Schulte MD

## 2021-04-27 NOTE — TELEPHONE ENCOUNTER
Called patient.  Patient willing to do ACTH stim test.  Patient has not yet seen sleep medicine.  We will plan for patient to do ACTH stim test, see sleep medicine, and return visit with me in July 2021 with recheck Hemoglobin A1c and TFTs.

## 2021-05-08 ENCOUNTER — HEALTH MAINTENANCE LETTER (OUTPATIENT)
Age: 51
End: 2021-05-08

## 2021-05-20 DIAGNOSIS — Z20.822 ENCOUNTER FOR LABORATORY TESTING FOR COVID-19 VIRUS: Primary | ICD-10-CM

## 2021-05-20 NOTE — TELEPHONE ENCOUNTER
Scheduled patient for July follow-up. Transferred patient to [facility] for ACTH scheduling.

## 2021-06-01 DIAGNOSIS — Z20.822 ENCOUNTER FOR LABORATORY TESTING FOR COVID-19 VIRUS: ICD-10-CM

## 2021-06-01 LAB
LABORATORY COMMENT REPORT: NORMAL
SARS-COV-2 RNA RESP QL NAA+PROBE: NEGATIVE
SARS-COV-2 RNA RESP QL NAA+PROBE: NORMAL
SPECIMEN SOURCE: NORMAL
SPECIMEN SOURCE: NORMAL

## 2021-06-01 PROCEDURE — U0005 INFEC AGEN DETEC AMPLI PROBE: HCPCS | Performed by: INTERNAL MEDICINE

## 2021-06-01 PROCEDURE — U0003 INFECTIOUS AGENT DETECTION BY NUCLEIC ACID (DNA OR RNA); SEVERE ACUTE RESPIRATORY SYNDROME CORONAVIRUS 2 (SARS-COV-2) (CORONAVIRUS DISEASE [COVID-19]), AMPLIFIED PROBE TECHNIQUE, MAKING USE OF HIGH THROUGHPUT TECHNOLOGIES AS DESCRIBED BY CMS-2020-01-R: HCPCS | Performed by: INTERNAL MEDICINE

## 2021-06-01 NOTE — LETTER
Patient:  Lupe Deluna  :   1970  MRN:     [id number]        Ms.Teresa NICOLAS Deluna  [address] [facility] [address]        2021    Dear Lupe    Here is your COVID test which is negative.    If you have any questions, please feel free to contact my nurse at [phone] select option #3 for triage nurse  or  option #1 for scheduling related questions.    Regards    Yolanda Schulte MD      Resulted Orders   SARS-CoV-2 COVID-19 Virus (Coronavirus) by PCR   Result Value Ref Range    SARS-CoV-2 Virus Specimen Source Nasopharyngeal     SARS-CoV-2 PCR Result NEGATIVE       Comment:      SARS-CoV2 (COVID-19) RNA not detected, presumed negative.    SARS-CoV-2 PCR Comment       Testing was performed using the muzu tv SARS-CoV-2 Assay on the [employer] Instrument System.   Additional information about this Emergency Use Authorization (EUA) assay can be found via   the Lab Guide.        Comment:      This test should be ordered for the detection of SARS-CoV-2 in individuals who   meet SARS-CoV-2 clinical and/or epidemiological criteria. Test performance is   unknown in asymptomatic patients.  This test is for in vitro diagnostic use under the FDA EUA for laboratories   certified under CLIA to perform high complexity testing. This test has not   been FDA cleared or approved.  A negative result does not rule out the presence of PCR inhibitors in the   specimen or target RNA in concentration below the limit of detection for the   assay. The possibility of a false negative should be considered if the   patient's recent exposure or clinical presentation suggests COVID-19.  This test was validated by the [facility]   [facility]. This laboratory is certified under the Clinical   Laboratory Improvement Amendments of 1988 (CLIA-88) as qualified to perform   high complexity laboratory testing.          COVID LAB

## 2021-06-03 ENCOUNTER — INFUSION THERAPY VISIT (OUTPATIENT)
Dept: INFUSION THERAPY | Facility: CLINIC | Age: 51
End: 2021-06-03
Attending: INTERNAL MEDICINE
Payer: COMMERCIAL

## 2021-06-03 VITALS
HEART RATE: 77 BPM | TEMPERATURE: 98.5 F | OXYGEN SATURATION: 99 % | DIASTOLIC BLOOD PRESSURE: 84 MMHG | SYSTOLIC BLOOD PRESSURE: 123 MMHG | RESPIRATION RATE: 16 BRPM

## 2021-06-03 DIAGNOSIS — R53.83 OTHER FATIGUE: Primary | ICD-10-CM

## 2021-06-03 LAB
CORTICOSTER 1H P 250 UG ACTH SERPL-SCNC: 20.4 UG/DL
CORTICOSTER 30M P 250 UG ACTH SERPL-SCNC: 26.6 UG/DL
CORTICOSTER SERPL-MCNC: 6.5 UG/DL (ref 4–22)

## 2021-06-03 PROCEDURE — 84244 ASSAY OF RENIN: CPT | Performed by: INTERNAL MEDICINE

## 2021-06-03 PROCEDURE — 82533 TOTAL CORTISOL: CPT | Performed by: INTERNAL MEDICINE

## 2021-06-03 PROCEDURE — 82024 ASSAY OF ACTH: CPT | Performed by: INTERNAL MEDICINE

## 2021-06-03 PROCEDURE — 96374 THER/PROPH/DIAG INJ IV PUSH: CPT

## 2021-06-03 PROCEDURE — 250N000011 HC RX IP 250 OP 636: Performed by: INTERNAL MEDICINE

## 2021-06-03 PROCEDURE — 999N000127 HC STATISTIC PERIPHERAL IV START W US GUIDANCE

## 2021-06-03 RX ADMIN — COSYNTROPIN 1 MCG: 0.25 INJECTION, POWDER, LYOPHILIZED, FOR SOLUTION INTRAMUSCULAR; INTRAVENOUS at 08:55

## 2021-06-03 ASSESSMENT — PAIN SCALES - GENERAL: PAINLEVEL: NO PAIN (0)

## 2021-06-03 NOTE — LETTER
Patient:  Lupe Deluna  :   1970  MRN:     [id number]        Ms.Lupe Deluna  [address] [facility] [address]        2021    Dear Lupe    Thank you for your patience.  I was waiting for some specialized results that all came back.  In short, your adrenal function is completely normal and likely not the reason for your fatigue.  This is good news.  The next step is to do the sleep evaluation as per our discussion. Please let me know if you need any help arranging.    If you have any questions, please feel free to contact my nurse at [phone] select option #3 for triage nurse  or  option #1 for scheduling related questions.    Regards    Yolanda Schulte MD    Resulted Orders   Adrenal corticotropin   Result Value Ref Range    Adrenal Corticotropin 23 <47 pg/mL   Renin activity   Result Value Ref Range    Renin Activity 0.6 ng/mL/hr      Comment:      (Note)  INTERPRETIVE INFORMATION: Renin Activity  Adult, Normal sodium diet:   Supine ................. 0.2-1.6 ng/mL/hr   Upright ................ 0.5-4.0 ng/mL/hr  Children, Normal sodium diet, Supine:    (1-7 days) ..... 2.0-35.0 ng/mL/hr   Cord blood ............. 4.0-32.0 ng/mL/hr   1-12 mos ............... 2.4-37.0 ng/mL/hr   13 mos-3 yrs ........... 1.7-11.2 ng/mL/hr   4-5 yrs ................ 1.0- 6.5 ng/mL/hr   6-10 yrs ............... 0.5- 5.9 ng/mL/hr   11-15 yrs .............. 0.5- 3.3 ng/mL/hr  Children, normal sodium diet, Upright:   0-3 yrs ................ Not Available   4-5 yrs ................ Less than or equal to 15 ng/mL/hr   6-10 yrs ............... Less than or equal to 17 ng/mL/hr   11-15 yrs .............. Less than or equal to 16 ng/mL/hr  Plasma renin activity measures enzyme ability to convert   angiotensinogen to angiotensin I and is limited by the   availability of angiotensinogen. Plasma renin activity is   not an accurate indicator of enzyme acti vity when   angiotensinogen is  decreased.  This test was developed and its performance characteristics   determined by [facility]. It has not been cleared or   approved by the US Food and Drug Administration. This test   was performed in a CLIA certified laboratory and is   intended for clinical purposes.  Performed By: [facility]  [address] [place] [id number]  : Peg Alberts MD     Cosyntropin stimulation study baseline   Result Value Ref Range    Cortisol Stimulation Baseline 6.5 4 - 22 ug/dL      Comment:      8 AM Cortisol Reference Range = 4-22 ug/dL  4 PM Cortisol Reference Range = 3-17 ug/dL     Cosyntropin stimulation study post 30   Result Value Ref Range    Cortisol Stimulation Post 30 26.6 >20 ug/dL      Comment:      Peak serum cortisol should be greater than 20 ug/dL 30-60 minutes post   stimulation.     Cosyntropin stimulation study post 60   Result Value Ref Range    Cortisol Stimulation Post 60 20.4 >20 ug/dL      Comment:      Peak serum cortisol should be greater than 20 ug/dL 30-60 minutes post   stimulation.         [facility]

## 2021-06-03 NOTE — LETTER
6/3/2021         RE: Lupe Deluna  [address] [facility] [address]        Dear Colleague,    Thank you for referring your patient, Lupe Deluna, to the [facility]. Please see a copy of my visit note below.    Nursing Note  Lupe Deluna [facility] for:   Chief Complaint   Patient presents with     Infusion     ACTH     During today's [facility] appointment, orders from Yolanda Schulte MD were completed.  Frequency: once    Progress note:  Patient identification verified by name and date of birth.  Assessment completed.  Vitals recorded in Doc Flowsheets.  Patient was provided with education regarding medication/procedure and possible side effects.  Patient verbalized understanding.     present during visit today: Not Applicable.    Treatment Conditions: Non-applicable.    Premedications: were not ordered.    Drug Waste Record: No    Infusion length and rate:  infusion given over approximately 1 minutes    Labs: were drawn per orders. Time zero drawn prior to cosyntropin injection, then 30 min post and 60 minutes post per orders.    Vascular access: peripheral IV was placed by vascular access nurse.    Is the next appt scheduled? N/A  Asymptomatic COVID test completed? Yes 6/1/2021    Post Infusion Assessment:  Patient tolerated infusion without incident.  Blood return noted pre and post infusion.  Site patent and intact, free from redness, edema or discomfort.  No evidence of extravasations.  Access discontinued per protocol.     Discharge Plan:   Follow up plan of care with: ordering provider as scheduled.  Discharge instructions were reviewed with patient.  Patient/representative verbalized understanding of discharge instructions and all questions answered.  Patient discharged from [facility] in stable condition.    Cheli  LUIS Chavez RN    Administrations This Visit     cosyntropin (CORTROSYN) in NS injection (LOW-DOSE) 1 mcg     Admin Date  06/03/2021 Action  Given Dose  1 mcg Route  Intravenous Administered By  Cheli Chavez RN                /84   Pulse 77   Temp 98.5  F (36.9  C)   Resp 16   SpO2 99%           Again, thank you for allowing me to participate in the care of your patient.        Sincerely,        [facility]

## 2021-06-03 NOTE — PROGRESS NOTES
Nursing Note  Lupe Deluna presents today to [facility] for:   Chief Complaint   Patient presents with     Infusion     ACTH     During today's [facility] appointment, orders from Yolanda Schulte MD were completed.  Frequency: once    Progress note:  Patient identification verified by name and date of birth.  Assessment completed.  Vitals recorded in Doc Flowsheets.  Patient was provided with education regarding medication/procedure and possible side effects.  Patient verbalized understanding.     present during visit today: Not Applicable.    Treatment Conditions: Non-applicable.    Premedications: were not ordered.    Drug Waste Record: No    Infusion length and rate:  infusion given over approximately 1 minutes    Labs: were drawn per orders. Time zero drawn prior to cosyntropin injection, then 30 min post and 60 minutes post per orders.    Vascular access: peripheral IV was placed by vascular access nurse.    Is the next appt scheduled? N/A  Asymptomatic COVID test completed? Yes 6/1/2021    Post Infusion Assessment:  Patient tolerated infusion without incident.  Blood return noted pre and post infusion.  Site patent and intact, free from redness, edema or discomfort.  No evidence of extravasations.  Access discontinued per protocol.     Discharge Plan:   Follow up plan of care with: ordering provider as scheduled.  Discharge instructions were reviewed with patient.  Patient/representative verbalized understanding of discharge instructions and all questions answered.  Patient discharged from [facility] in stable condition.    Cheli Chavez RN    Administrations This Visit     cosyntropin (CORTROSYN) in NS injection (LOW-DOSE) 1 mcg     Admin Date  06/03/2021 Action  Given Dose  1 mcg Route  Intravenous Administered By  Cheli Chavez RN                /84   Pulse 77   Temp 98.5  F (36.9  C)   Resp  16   SpO2 99%

## 2021-06-04 LAB — ACTH PLAS-MCNC: 23 PG/ML

## 2021-06-08 ENCOUNTER — OFFICE VISIT (OUTPATIENT)
Dept: DERMATOLOGY | Facility: CLINIC | Age: 51
End: 2021-06-08
Payer: COMMERCIAL

## 2021-06-08 DIAGNOSIS — M35.9 AUTOIMMUNE DISEASE (H): ICD-10-CM

## 2021-06-08 DIAGNOSIS — L63.1 ALOPECIA UNIVERSALIS: Primary | ICD-10-CM

## 2021-06-08 PROCEDURE — 99214 OFFICE O/P EST MOD 30 MIN: CPT | Mod: GC | Performed by: DERMATOLOGY

## 2021-06-08 ASSESSMENT — PAIN SCALES - GENERAL: PAINLEVEL: NO PAIN (0)

## 2021-06-08 NOTE — LETTER
6/8/2021       RE: Lupe Deluna  [address] [address]     Dear Colleague,    Thank you for referring your patient, Lupe Deluna, to the [facility] [place] at [facility]. Please see a copy of my visit note below.    [facility] Dermatology Note  Encounter Date: Jun 8, 2021  Office visit    Dermatology Problem List:  1. 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 Possible component of telogen effluvium.  - Labs 3/2021 (Ferritin, total iron, iron binding capacity, CBC with differential, zinc, DHEAS, Free testosterone and total vitamin D, selenium, FSH) with high vit D (119) and borderline high selenium. Stopped supplementing all vitamins.  - Extensive thyroid studies negative. Patient interested in treating with thyroid supplementation anyway, planning to see a physician in [place] who has succesfully treated alopecia areata with thyroid replacement even in the absence of thyroid abnormalities.  - interested in clinical trials, has not enrolled yet but in contact with research fellow  - not doing any topical therapies  - consider Xeljanz in the future, provided patient with Journal article regarding safety and efficacy of Xeljanz    Procedures Performed:   None    Follow-up: 6 months    Staff and Resident:   Jef King MD  Dermatology Resident    Patient was seen and examined with the dermatology resident. I agree with the history, review of systems, physical examination, assessments and plan.    Zainab Oliver MD  Professor and  Chair  Department of Dermatology  [facility]  [place]    ____________________________________________    CC: Derm Problem (Here to follow up on hairloss and indicates no change since the last visit.  Is only taking flexeril PRN and Zolpidem but unsure of dose and mg and takes PRN.  Not taking any other OTC or prescribed medications. )      HPI:  Ms. Lupe Deluna is a(n) 50 year old female who presents today as a follow up patient for alopecia universalis. Not treating. No hair regrowth on scalp, face, or body. Had high vit D and selenium on recent labs in 3/2021 and subsequently stopped supplementing all vitamins. Planning on seeing a doctor in [place] who has succesfully treated alopecia areata with thyroid replacement even in the absence of thyroid abnormalities.    - Scalp pain: no  - Scalp burning: no  - Scalp itching: no  - Eyebrow or eyelash changes: no, complete loss  - Nail changes: no    ROS: As per HPI    Labs:  TSH reviewed.    3/1/21:   - TSH: 4.44  - T4, Free: 0.81    12/24/20:   - TSH: <0.01 (low)  - Free T4 3.3 (high)    Dermpath report - reviewed  9/24/20  - c/w alopecia areta  - full report in Media tab     Physical Exam:  Vitals: There were no vitals taken for this visit.  SKIN:   Focused exam of scalp, forehead, face, and hands was performed and was significant for:  - fine vellus fibers on the frontal hairline   - patchy erythema on lateral eyebrows  - no evident hair on scalp, eyebrows, or eyelashes; follicular ostia intact   - horizontal ridging on nails and irregular pitting on all fingernails     Medications:  Current Outpatient Medications   Medication     Calcium Carb-Cholecalciferol (CALCIUM 1000 + D PO)     Cholecalciferol (VITAMIN D) 125 MCG (5000 UT) capsule     Multiple Vitamins-Minerals (ONE-A-DAY WITHIN) TABS     Multiple Vitamins-Minerals (ZINC PO)     NONFORMULARY     NONFORMULARY     NONFORMULARY     NONFORMULARY     NONFORMULARY     NONFORMULARY     Prasterone, DHEA, (DHEA 50) 50 MG CAPS     Prasterone, DHEA, 10 MG  CAPS     Quercetin 250 MG TABS     vitamin D2 (ERGOCALCIFEROL) 04237 units (1250 mcg) capsule     No current facility-administered medications for this visit.       Past Medical/Surgical History:   Patient Active Problem List   Diagnosis     Fatigue     No past medical history on file.    [facility]  [address] [id number]       Again, thank you for allowing me to participate in the care of your patient.      Sincerely,    Zainab Oliver MD

## 2021-06-08 NOTE — PROGRESS NOTES
[facility] Dermatology Note  Encounter Date: Jun 8, 2021  Office visit    Dermatology Problem List:  1. 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 Possible component of telogen effluvium.  - Labs 3/2021 (Ferritin, total iron, iron binding capacity, CBC with differential, zinc, DHEAS, Free testosterone and total vitamin D, selenium, FSH) with high vit D (119) and borderline high selenium. Stopped supplementing all vitamins.  - Extensive thyroid studies negative. Patient interested in treating with thyroid supplementation anyway, planning to see a physician in [place] who has succesfully treated alopecia areata with thyroid replacement even in the absence of thyroid abnormalities.  - interested in clinical trials, has not enrolled yet but in contact with research fellow  - not doing any topical therapies  - consider Xeljanz in the future, provided patient with Journal article regarding safety and efficacy of Xeljanz    Procedures Performed:   None    Follow-up: 6 months    Staff and Resident:   Jef King MD  Dermatology Resident    Patient was seen and examined with the dermatology resident. I agree with the history, review of systems, physical examination, assessments and plan.    Zainab Oliver MD  Professor and  Chair  Department of Dermatology  [facility]    ____________________________________________    CC: Derm Problem (Here to follow up on hairloss and indicates no change since the last visit.  Is only taking flexeril PRN and Zolpidem but unsure of dose and mg and takes PRN.  Not taking any other OTC or prescribed medications. )      HPI:  Ms. Lupe Deluna is a(n) 50 year old female who presents today as a follow up  patient for alopecia universalis. Not treating. No hair regrowth on scalp, face, or body. Had high vit D and selenium on recent labs in 3/2021 and subsequently stopped supplementing all vitamins. Planning on seeing a doctor in [place] who has succesfully treated alopecia areata with thyroid replacement even in the absence of thyroid abnormalities.    - Scalp pain: no  - Scalp burning: no  - Scalp itching: no  - Eyebrow or eyelash changes: no, complete loss  - Nail changes: no    ROS: As per HPI    Labs:  TSH reviewed.    3/1/21:   - TSH: 4.44  - T4, Free: 0.81    12/24/20:   - TSH: <0.01 (low)  - Free T4 3.3 (high)    Dermpath report - reviewed  9/24/20  - c/w alopecia areta  - full report in Media tab     Physical Exam:  Vitals: There were no vitals taken for this visit.  SKIN:   Focused exam of scalp, forehead, face, and hands was performed and was significant for:  - fine vellus fibers on the frontal hairline   - patchy erythema on lateral eyebrows  - no evident hair on scalp, eyebrows, or eyelashes; follicular ostia intact   - horizontal ridging on nails and irregular pitting on all fingernails     Medications:  Current Outpatient Medications   Medication     Calcium Carb-Cholecalciferol (CALCIUM 1000 + D PO)     Cholecalciferol (VITAMIN D) 125 MCG (5000 UT) capsule     Multiple Vitamins-Minerals (ONE-A-DAY WITHIN) TABS     Multiple Vitamins-Minerals (ZINC PO)     NONFORMULARY     NONFORMULARY     NONFORMULARY     NONFORMULARY     NONFORMULARY     NONFORMULARY     Prasterone, DHEA, (DHEA 50) 50 MG CAPS     Prasterone, DHEA, 10 MG CAPS     Quercetin 250 MG TABS     vitamin D2 (ERGOCALCIFEROL) 60236 units (1250 mcg) capsule     No current facility-administered medications for this visit.       Past Medical/Surgical History:   Patient Active Problem List   Diagnosis     Fatigue     No past medical history on file.    [facility]  [address] [address] [facility] [address]  [place] [address]  on close of this encounter.

## 2021-06-09 LAB — RENIN PLAS-CCNC: 0.6 NG/ML/HR

## 2021-06-12 NOTE — PROCEDURES
ACTH Stim normal.    -  Dear Lupe    Thank you for your patience.  I was waiting for some specialized results that all came back.  In short, your adrenal function is completely normal and likely not the reason for your fatigue.  This is good news.  The next step is to do the sleep evaluation as per our discussion. Please let me know if you need any help arranging.    If you have any questions, please feel free to contact my nurse at [phone] select option #3 for triage nurse  or  option #1 for scheduling related questions.    Regards    Yolanda Schulte MD

## 2021-06-15 ENCOUNTER — TELEPHONE (OUTPATIENT)
Dept: ENDOCRINOLOGY | Facility: CLINIC | Age: 51
End: 2021-06-15

## 2021-06-15 NOTE — LETTER
Patient:  Lupe Deluna  :   1970  MRN:     [id number]        Ms.Lupe Deluna  [address] [address]        Joycelyn 15, 2021    Dear Lupe    Thank you for your recent stimulation test.  I am glad to say that it is entirely normal.  This suggest that adrenal gland is not the reason for your symptoms.    Please let me know if you are interested in a sleep medicine referral.    I am glad  you are set up to see Dr. Guerrero.  She is outstanding.    If you have any questions, please feel free to contact my nurse at [phone] select option #3 for triage nurse  or  option #1 for scheduling related questions.    Regards    Yolanda Schulte MD    No visits with results within 1 Week(s) from this visit.   Latest known visit with results is:   Infusion Therapy Visit on 2021   Component Date Value Ref Range Status     Adrenal Corticotropin 2021 23  <47 pg/mL Final     Renin Activity 2021 0.6  ng/mL/hr Final    Comment: (Note)  INTERPRETIVE INFORMATION: Renin Activity  Adult, Normal sodium diet:   Supine ................. 0.2-1.6 ng/mL/hr   Upright ................ 0.5-4.0 ng/mL/hr  Children, Normal sodium diet, Supine:    (1-7 days) ..... 2.0-35.0 ng/mL/hr   Cord blood ............. 4.0-32.0 ng/mL/hr   1-12 mos ............... 2.4-37.0 ng/mL/hr   13 mos-3 yrs ........... 1.7-11.2 ng/mL/hr   4-5 yrs ................ 1.0- 6.5 ng/mL/hr   6-10 yrs ............... 0.5- 5.9 ng/mL/hr   11-15 yrs .............. 0.5- 3.3 ng/mL/hr  Children, normal sodium diet, Upright:   0-3 yrs ................ Not Available   4-5 yrs ................ Less than or equal to 15 ng/mL/hr   6-10 yrs ............... Less than or equal to 17 ng/mL/hr   11-15 yrs .............. Less than or equal to 16 ng/mL/hr  Plasma renin activity measures enzyme ability to convert   angiotensinogen to angiotensin I and is limited by the   availability of angiotensinogen. Plasma renin activity is   not an  accurate indicator of enzyme acti                           vity when   angiotensinogen is decreased.  This test was developed and its performance characteristics   determined by [facility]. It has not been cleared or   approved by the US Food and Drug Administration. This test   was performed in a CLIA certified laboratory and is   intended for clinical purposes.  Performed By: [facility]  [address] [place] [id number]  : Peg Alberts MD       Cortisol Stimulation Baseline 06/03/2021 6.5  4 - 22 ug/dL Final    Comment: 8 AM Cortisol Reference Range = 4-22 ug/dL  4 PM Cortisol Reference Range = 3-17 ug/dL       Cortisol Stimulation Post 30 06/03/2021 26.6  >20 ug/dL Final    Comment: Peak serum cortisol should be greater than 20 ug/dL 30-60 minutes post   stimulation.       Cortisol Stimulation Post 60 06/03/2021 20.4  >20 ug/dL Final    Comment: Peak serum cortisol should be greater than 20 ug/dL 30-60 minutes post   stimulation.

## 2021-06-15 NOTE — TELEPHONE ENCOUNTER
Call patient.  Cannot leave message.  Letter sent.  ACTH stim test was normal    ----- Message from Yolanda Schulte MD sent at 6/12/2021  8:52 AM CDT -----  Called pt on Monday for follow up labs

## 2021-07-06 ENCOUNTER — RESEARCH ENCOUNTER (OUTPATIENT)
Dept: DERMATOLOGY | Facility: CLINIC | Age: 51
End: 2021-07-06

## 2021-07-06 NOTE — PROGRESS NOTES
Subject expressed interest in participating in a clinical research study and was contacted by VIRAL Ríos in June of 2021. This subject had recently started a new thyroid medication and wanted to give that a chance to work and declined participation at this time, but would like to be kept on the mailing list.

## 2021-07-07 NOTE — PROGRESS NOTES
Primary Care Center  Internal Medicine    Chief Complaint   Patient presents with     [facility] Care     Physical   Other health care team members:  Endocrinology: Dr. Singleton, Dr Schulte  Dermatology:  Dr. Oliver  [facility]    HPI:  Lupe Deluna is a/an 50 year old female with a past medical history of:   Alopecia universalis  Thyroid condition  Fatigue  Memory issues  DANN, no BSO  Prediabetes  Stress incontinence  Depression   Anxiety  Cervicalgia  Rosacea  Thrombocytosis  Diarrhea    -Started noticing her hairline receding in March 2020, has been to multiple specialists (Endocrinology, Dermatology, Rheumatology). Ultimately diagnosed with Alopecia Universalis. Follows with Dr. Medina at [facility].     Had an episode of thyroiditis in Dec 2020, with TSH < 0.01, thyroid US at [place] in 12/24/20 which showed her thyroid to be heterogenous but did not reveal any distinct nodules.     Her methimazole medication was stopped in March 2021. She also discontinued bioindentical hormonal therapy.     Patient sees Dr. Hernandez located in [place]. She is prescribing her Levothyroxine 75 mcg (began 6/17/21) even in the absence of thyroid abnormalities in her blood work that patient got from [facility] on 6/14/21 for which we don't have the records. Patient reports she has been doing significantly better since she began levothyroxine. Says that her skin has not been as dry, nails not as brittle, her fatigue, and memory issues have improved and she started to feel some hair growing back on her scalp. Wondering if we can take over and prescribe Levo from here on out.     Dr. Schulte, Dr. Singleton, Dr. Medina's notes reviewed. Some CareEverywhere notes were reviewed.  Answers for HPI/ROS submitted by the patient on 7/12/2021  General Symptoms: Yes  Skin Symptoms: Yes  HENT Symptoms: No  EYE SYMPTOMS: No  HEART SYMPTOMS: No  LUNG SYMPTOMS: No  INTESTINAL SYMPTOMS: No  URINARY SYMPTOMS:  No  GYNECOLOGIC SYMPTOMS: Yes  BREAST SYMPTOMS: No  SKELETAL SYMPTOMS: Yes  BLOOD SYMPTOMS: No  NERVOUS SYSTEM SYMPTOMS: No  MENTAL HEALTH SYMPTOMS: No  Fever: No  Loss of appetite: No  Weight loss: No  Weight gain: No  Fatigue: Yes  Night sweats: No  Chills: No  Increased stress: No  Excessive hunger: No  Excessive thirst: No  Feeling hot or cold when others believe the temperature is normal: No  Loss of height: No  Post-operative complications: No  Surgical site pain: No  Hallucinations: No  Change in or Loss of Energy: No  Hyperactivity: No  Confusion: No  Changes in hair: Yes  Changes in moles/birth marks: No  Itching: Yes  Rashes: No  Changes in nails: Yes  Acne: No  Hair in places you don't want it: No  Change in facial hair: No  Warts: No  Non-healing sores: No  Scarring: No  Flaking of skin: No  Color changes of hands/feet in cold : No  Sun sensitivity: No  Skin thickening: No  Bleeding or spotting between periods: No  Heavy or painful periods: No  Irregular periods: No  Vaginal discharge: No  Hot flashes: Yes  Vaginal dryness: Yes  Genital ulcers: No  Reduced libido: Yes  Painful intercourse: No  Difficulty with sexual arousal: Yes  Post-menopausal bleeding: No  Back pain: Yes  Muscle aches: Yes  Neck pain: No  Swollen joints: No  Joint pain: Yes  Bone pain: No  Muscle cramps: Yes  Muscle weakness: No  Joint stiffness: Yes  Bone fracture: No        Surgery Date Site/Laterality Comments   SINUS SURGERY 1999 - 1999        TONSILLECTOMY 1973 - 1973        HERNIA REPAIR 1972 - 1972        BREAST SURGERY 1990 - 1990        HYSTERECTOMY 2015 - 2015        OTHER SURGICAL HISTORY 2013 - 2013   2018      SECTION 8/3/1988        Family History    Medical History Relation Name Comments   Arthritis Father Pepe Deluna     Colon polyps Father Pepe Deluna Benign   Diabetes Father Pepe Deluna Type 2   Hyperlipidemia Father Pepe Deluna      Hypertension Father Pepe [facility] Borderline   Sleep apnea Father Pepe Deluna     Thyroid disease Father Pepe Deluna Hypo   Lung cancer Father's Brother Luis [facility]     Psychiatric Father's Brother Luis Saraviaaster     Seizures Father's Brother Luis Saraviaaster [employer]   Suicide Attempts Father's Brother Luis Regi     Thyroid disease Father's Brother Luis Deluna Hypo   Kidney disease Father's Brother Sergey     Other cancer Father's Brother Sergey Ureter Cancer   Thyroid disease Father's Brother Sergey Hypo   Thyroid disease Father's Sister Nadia Hypo   Alcohol abuse Maternal Grandfather Gene Ursula     Lung cancer Maternal Grandfather Gene Bradshaw Grandpa   Breast cancer Maternal Grandmother Radha Great-Grandmother   Obesity Maternal Grandmother Radha     Osteoporosis Maternal Grandmother Radha     Skin cancer Maternal Grandmother Radha Great-Grandma   Hypertension Mother Mary Alice Chamberlain     Learning disorder Mother Mary Alice Chamberlain Dyslexia   Osteoporosis Mother Mary Alice Chamberlain     Stroke Mother's Brother Jono Vergara     Alcohol abuse Paternal Grandfather Lio Deluna     Stroke Paternal Grandfather Lio Deluna Mild Stroke   Thyroid disease Paternal Grandmother Cherie Deluna Hypo     Social History     Tobacco Use     Smoking status: Former Smoker     Smokeless tobacco: Never Used   Substance Use Topics     Alcohol use: Not on file     Drug use: Not on file     Medications noted in [facility] records:  cyclobenzaprine (FLEXERIL) 10 mg tablet   TAKE 1 TABLET BY MOUTH THREE TIMES DAILY AS NEEDED FOR MUSCLE SPASM   0 08/18/2020   Active   zolpidem 10 mg tablet, sublingual       0     Active     - Levothyroxine 75 mcg oral every morning, started 6/17/21        Allergies   Allergen Reactions     Tramadol Itching     Sulfa Drugs Diarrhea       PHYSICAL EXAM:  BP Readings from Last 6 Encounters:   06/03/21 123/84     Physical Examination:    General:  Conversant, generally healthy  appearing, no acute distress  Head: atraumatic  Eyes:  Pupils 2-3 mm, sclera white, EOM's full, conjunctiva moist, no periorbital swelling. No proptosis, no lid lag.  Ears:  TM's normal, EAC's patent, clear of cerumen  Throat/Mouth:  No pharyngeal erythema, exudate, ulcers, oral mucosa and tongue moist, normal hard and soft palate  Neck:  Trachea midline, Full AROM, supple, thyroid smooth, symmetric, not enlarged, no nodules, no neck lymphadenopathy  Lungs:  Clear to auscultation throughout, no wheezes, rhonchi or rales. Normal respiratory effort and no intercostal retractions.  C/V:  Regular rate and rhythm, no murmurs, rubs or gallops.  No JVD, no carotid bruits.   Abdomen:  Not distended.  Bowel sounds active.  No tenderness, no hepatosplenomegaly or masses.  No CVA tenderness or masses  Neuro: Alert and oriented, face symmetric. Able to get on/off exam table without assistance.  Strength grossly intact. No tremor.  Gait steady.   M/S:   No joint deformities noted.  No joint swelling.  Skin:   Normal temperature., turgor and texture. No rashes, suspicious lesions, or jaundice on exposed skin surfaces. Scalp and brows devoid of hair.  Extremities:  No peripheral edema, no digital cyanosis  Psych:  Alert and oriented. Appropriate affect.  Not psychomotor slowed.  No signs of anxiety or agitation.      ASSESSMENT and PLAN    1. HCM:  -Mammogram ?1/31/21? ([facility], report not visible)  -Pap neg 9/12/14, s/p DANN  -Immunizations--up to date on Td as reported by patient, no records in [facility]. Patient does not want to get COVID vaccination  -Lipids normal 6/4/20 - repeat in 2-3 years  -Thyroid per endocrinology/ Dr. Hernandez  -Due for colonoscopy - ordered today  -A1C 6 on 4/21/21  -BMI 26    2. Elevated BP    - BP is elevated at 144/85 today. Isolated elevation, has been normal past two times 123/84 in 6/21 and 126/85 in 1/21.   -  Encouraged patient to be mindful about sodium in her diet  - Encouraged patient to get BP  cuff and record at home. If persistently elevated will consider medication  - For now will do lifestyle changes    3. Care coordination     - Will defer to Dr. Hernandez for levothyroxine prescriptions and regular TFTs    Patient understands and agrees with the assessment and plan above. Patient seen and case was discussed with Dr. Cesar Souza, DO  PGY 1, Internal Medicine  Pager: [phone]

## 2021-07-12 ENCOUNTER — OFFICE VISIT (OUTPATIENT)
Dept: INTERNAL MEDICINE | Facility: CLINIC | Age: 51
End: 2021-07-12
Payer: COMMERCIAL

## 2021-07-12 VITALS — DIASTOLIC BLOOD PRESSURE: 85 MMHG | HEART RATE: 57 BPM | OXYGEN SATURATION: 99 % | SYSTOLIC BLOOD PRESSURE: 144 MMHG

## 2021-07-12 DIAGNOSIS — Z76.89 ENCOUNTER TO ESTABLISH CARE WITH NEW DOCTOR: ICD-10-CM

## 2021-07-12 DIAGNOSIS — L63.0 ALOPECIA TOTALIS: ICD-10-CM

## 2021-07-12 DIAGNOSIS — Z00.00 HEALTHCARE MAINTENANCE: Primary | ICD-10-CM

## 2021-07-12 PROCEDURE — 99386 PREV VISIT NEW AGE 40-64: CPT | Mod: GC | Performed by: INTERNAL MEDICINE

## 2021-07-12 RX ORDER — LEVOTHYROXINE SODIUM 75 UG/1
75 TABLET ORAL EVERY MORNING
COMMUNITY
Start: 2021-06-16 | End: 2022-11-14

## 2021-07-12 RX ORDER — CICLOPIROX 80 MG/ML
SOLUTION TOPICAL PRN
COMMUNITY
Start: 2021-06-25 | End: 2022-11-14

## 2021-07-12 RX ORDER — CYCLOBENZAPRINE HCL 10 MG
TABLET ORAL
COMMUNITY
Start: 2021-06-29 | End: 2023-08-07

## 2021-07-12 RX ORDER — ZOLPIDEM TARTRATE 10 MG/1
10 TABLET ORAL
COMMUNITY
Start: 2020-04-01 | End: 2023-08-07

## 2021-07-12 ASSESSMENT — ENCOUNTER SYMPTOMS
FATIGUE: 1
NIGHT SWEATS: 0
WEIGHT LOSS: 0
MUSCLE WEAKNESS: 0
DECREASED APPETITE: 0
SKIN CHANGES: 0
MYALGIAS: 1
STIFFNESS: 1
ALTERED TEMPERATURE REGULATION: 0
POLYPHAGIA: 0
ARTHRALGIAS: 1
BACK PAIN: 1
POOR WOUND HEALING: 0
JOINT SWELLING: 0
FEVER: 0
HALLUCINATIONS: 0
WEIGHT GAIN: 0
MUSCLE CRAMPS: 1
NECK PAIN: 0
POLYDIPSIA: 0
NAIL CHANGES: 1
DECREASED LIBIDO: 1
HOT FLASHES: 1
INCREASED ENERGY: 0
CHILLS: 0

## 2021-07-12 ASSESSMENT — PAIN SCALES - GENERAL: PAINLEVEL: NO PAIN (0)

## 2021-07-12 NOTE — PROGRESS NOTES
Teaching Physician Note:  I was present during the visit and the patient was seen and examined by me.   I discussed the case with the resident and agree with the note as documented by the resident with the following exceptions:  None.    Katerina Guerrero M.D.  Internal Medicine         Patients: if you have questions or concerns about this progress note, please discuss them with the provider at a future office visit.

## 2021-07-15 NOTE — PROGRESS NOTES
"Lupe Deluna  is being evaluated via a billable video visit.      How would you like to obtain your AVS? [employer]  For the video visit, send the invitation by: eder  Will anyone else be joining your video visit? No        Lupe Deluna  is being evaluated via a billable video visit.      How would you like to obtain your AVS? [employer]  For the video visit, send the invitation by: [employer]  Will anyone else be joining your video visit? Carla pastrana a 50 year old female presents today for Video Visit (endocrine)    7/16/21    Start time at 2:35. Stop time at 2:50, documentation time 5 minutes, total time of 20 minutes    HPI  #1 thyroid concerns  The patient had a history of hair loss starting in April 2020.  In December 2020, she was noted to have a TSH of 0.01, free T3 of 6.2, free T4 2.1.  Per patient report, she was on biotin at that time but stopped her biotin for about 2 days before hand.  She denies any history of scan or uptake.  She did have thyroid ultrasound which did not identify any nodules but was noted to be quite heterogeneous.  She did not have any scan or uptake.  She was presumptively put on methimazole at 10 mg daily.  She was seen by Dr. Thapa at [facility] (January 2021, Feb 2021) and then decided to transfer her care to the [facility].  She was seen by my colleague Dr. Singleton (March 2021) for which the decision was to follow her thyroid function test, taper her methimazole, and eventually stopped her methimazole completely in March 2021.    The patient reports that despite normalization of her TFTs and stopping the methimazole, she continues to have hair loss and issues with fatigue and memory.  She reports that when she was on methimazole, perhaps her \"shoulder pain\" was better.  She does report that her shoulder pain has appeared to be worse since stopping the methimazole.  Of note, her TSI and thyrotropin receptor antibodies were negative.    Interval history: " April 2021 TSH was 1.23, free T4 was 0.98.  I recommend observation.  The patient went to a provider referred by her friend (provider [place]) and was started on generic levothyroxine at 75 mcg daily.  She has been on this since roughly June 2021.  Since being on the levothyroxine 75 mcg daily, the patient reports a marked improvement in her fatigue, hair loss, and memory.  She has been very satisfied.  She reports that her recheck TFTs will be in August 2021.    #2 fatigue  The patient reports a history of fatigue.  She goes to sleep generally around 9 PM and wakes up around 5 AM.  She reports that she sometimes awakens 1-2 times per night.  She reports that she is tired when she is awake.  She denies snoring.  She is never had a sleep evaluation.     Interval history: April 2021 TFTs were normal.  June 2021 ACTH stim test was normal.    #3 hair loss  This is her most pressing concern.  This started in roughly April 2020.  She has lost her hair completely from her body (scalp, eyelashes, eyebrows, axillary region).  She has been evaluated by 2 dermatologist.  She reports receiving steroids in January 2021 (60 mg daily-tapering off over 6 weeks) which did not clearly improve her hair loss.  She was also temporarily started on methimazole (see issue #1) which also did not help her hair loss.    Interval history: Patient started on levothyroxine 75 mcg daily in roughly June 2021 with improvement in her hair and now reports that her hair is actually growing back.    #4 history of poor memory-April 2021 elevated FSH and LH suggestive of postmenopausal state  The patient reports a history of poor memory for the past year.  This appears to be short-term memory.  She reports needing lists and reminders to make sure she gets her work done.      Of note, the patient reports a history of hysterectomy in 2015 with her ovaries remaining.  She reports a history of bioidentical hormone use (estrogen, testosterone, done by Stella  Astrid, nurse practitioner) which did not clearly seem to help her memory or her energy levels.  However she stopped this as of March 2021.    Interval history: The patient reports that her memory has also improved with initiation of levothyroxine in June 2021.  Labs from April 2021 with elevated FSH and LH suggest that she is in the post menopausal state.    #5 elevated hemoglobin A1c of 6.0 noted in April 2021  Hemoglobin A1c noted be slightly higher at 6.0 in April 2021.  Dietary lifestyle changes recommended.    Past Medical History  No past medical history on file.    Allergies  Allergies   Allergen Reactions     Tramadol Itching     Sulfa Drugs Diarrhea     Medications  Current Outpatient Medications   Medication Sig Dispense Refill     ciclopirox (PENLAC) 8 % external solution APPLY TO AFFECTED NAILS EVERY NIGHT AT BEDTIME. WASH OFF ONCE WEEKLY WITH ALCOHOL PAD       cyclobenzaprine (FLEXERIL) 10 MG tablet TAKE 1 TABLET BY MOUTH THREE TIMES DAILY AS NEEDED       levothyroxine (SYNTHROID/LEVOTHROID) 75 MCG tablet Take 75 mcg by mouth every morning       zolpidem (AMBIEN) 10 MG tablet Take 10 mg by mouth       Family History  family history is not on file.  Social History  Social History     Socioeconomic History     Marital status: Single     Spouse name: Not on file     Number of children: Not on file     Years of education: Not on file     Highest education level: Not on file   Occupational History     Not on file   Tobacco Use     Smoking status: Former Smoker     Smokeless tobacco: Never Used   Substance and Sexual Activity     Alcohol use: Not on file     Drug use: Not on file     Sexual activity: Not on file   Other Topics Concern     Parent/sibling w/ CABG, MI or angioplasty before 65F 55M? Not Asked   Social History Narrative     Not on file     Social Determinants of Health     Financial Resource Strain:      Difficulty of Paying Living Expenses:    Food Insecurity:      Worried About Running Out of  "Food in the Last Year:      Ran Out of Food in the Last Year:    Transportation Needs:      Lack of Transportation (Medical):      Lack of Transportation (Non-Medical):    Physical Activity:      Days of Exercise per Week:      Minutes of Exercise per Session:    Stress:      Feeling of Stress :    Social Connections:      Frequency of Communication with Friends and Family:      Frequency of Social Gatherings with Friends and Family:      Attends [employer] Services:      Active Member of Clubs or Organizations:      Attends Club or Organization Meetings:      Marital Status:    Intimate Partner Violence:      Fear of Current or Ex-Partner:      Emotionally Abused:      Physically Abused:      Sexually Abused:        ROS  Per HPI      Physical Exam  GENERAL: Healthy, alert and no distress\",\"EYES: Eyes grossly normal to inspection.  No discharge or erythema, or obvious scleral/conjunctival abnormalities.\",\"RESP: No audible wheeze, cough, or visible cyanosis.  No visible retractions or increased work of breathing.  \",\"SKIN: Visible skin clear. No significant rash, abnormal pigmentation or lesions. Noted alopecia\",\"NEURO: Cranial nerves grossly intact.  Mentation and speech appropriate for age.\",\"PSYCH: Mentation appears normal, affect normal/bright, judgement and insight intact, normal speech and appearance well-groomed.    RESULTS  No visits with results within 1 Week(s) from this visit.   Latest known visit with results is:   Infusion Therapy Visit on 2021   Component Date Value Ref Range Status     Adrenal Corticotropin 2021 23  <47 pg/mL Final     Renin Activity 2021 0.6  ng/mL/hr Final    Comment: (Note)  INTERPRETIVE INFORMATION: Renin Activity  Adult, Normal sodium diet:   Supine ................. 0.2-1.6 ng/mL/hr   Upright ................ 0.5-4.0 ng/mL/hr  Children, Normal sodium diet, Supine:    (1-7 days) ..... 2.0-35.0 ng/mL/hr   Cord blood ............. 4.0-32.0 ng/mL/hr   1-12 mos " ............... 2.4-37.0 ng/mL/hr   13 mos-3 yrs ........... 1.7-11.2 ng/mL/hr   4-5 yrs ................ 1.0- 6.5 ng/mL/hr   6-10 yrs ............... 0.5- 5.9 ng/mL/hr   11-15 yrs .............. 0.5- 3.3 ng/mL/hr  Children, normal sodium diet, Upright:   0-3 yrs ................ Not Available   4-5 yrs ................ Less than or equal to 15 ng/mL/hr   6-10 yrs ............... Less than or equal to 17 ng/mL/hr   11-15 yrs .............. Less than or equal to 16 ng/mL/hr  Plasma renin activity measures enzyme ability to convert   angiotensinogen to angiotensin I and is limited by the   availability of angiotensinogen. Plasma renin activity is   not an accurate indicator of enzyme acti                           vity when   angiotensinogen is decreased.  This test was developed and its performance characteristics   determined by [facility]. It has not been cleared or   approved by the [place] Food and Drug Administration. This test   was performed in a CLIA certified laboratory and is   intended for clinical purposes.  Performed By: [facility]  [address] [place] [id number]  : Peg Alberts MD       Cortisol Stimulation Baseline 06/03/2021 6.5  4 - 22 ug/dL Final    Comment: 8 AM Cortisol Reference Range = 4-22 ug/dL  4 PM Cortisol Reference Range = 3-17 ug/dL       Cortisol Stimulation Post 30 06/03/2021 26.6  >20 ug/dL Final    Comment: Peak serum cortisol should be greater than 20 ug/dL 30-60 minutes post   stimulation.       Cortisol Stimulation Post 60 06/03/2021 20.4  >20 ug/dL Final    Comment: Peak serum cortisol should be greater than 20 ug/dL 30-60 minutes post   stimulation.         Component      Latest Ref Rng & Units 4/21/2021   25 OH Vit D2      ug/L <5   25 OH Vit D3      ug/L 67   25 OH Vit D total      20 - 75 ug/L <72   Tissue Transglutaminase Antibody IgA      <7 U/mL <1   Tissue Transglutaminase Bernice IgG      <7 U/mL <1   Cortisol Serum      4 - 22 ug/dL  7.4   Thyroid Peroxidase Antibody      <35 IU/mL <10   TSH      0.40 - 4.00 mU/L 1.23   T4 Free      0.76 - 1.46 ng/dL 0.98   Triiodothyronine (T3)      60 - 181 ng/dL 142   Thyroglobulin Antibody      <40 IU/mL <20   FSH      IU/L 50.4   Lutropin      IU/L 31.5   Hemoglobin A1C      0 - 5.6 % 6.0 (H)       ASSESSMENT:    #1 thyroid concerns  The patient was started on levothyroxine 75 mcg daily by an outside provider.  Since being on this medication since roughly June 2021, the patient reports a marked improvement in her symptoms including her energy, hair, fatigue, and overall wellbeing.  She also reports that her nails are growing back.  She has been very satisfied with this program.    I did discuss with the patient that if her recheck TSH is normal (although it can be on the lower end of the normal range), I could refill her levothyroxine at 75 mcg daily and follow her here in [place].  If however her recheck TSH is suppressed, (below normal range), then I would defer follow-up to her provider in [place].    I did discuss with the patient that she could consider brand-name Synthroid (instead of generic levothyroxine) to help improve the consistency of the thyroid hormone administration.    #2 fatigue  ACTH stim test was normal.  Sleep evaluation not done.  However patient reports that her fatigue had markedly improved on levothyroxine at 75 mcg daily.    #3 hair loss  Per patient report, her hair is now starting to grow back on levothyroxine at 75 mcg daily.    #4 history of poor memory-April 2021 elevated FSH and LH suggestive of postmenopausal state  Per patient report, her memory is also not improving on levothyroxine 75 mcg daily.    #5 elevated hemoglobin A1c of 6.0 noted in April 2021  Per primary provider.     I am glad the patient is overall feeling better.  I will let the patient decide on how she wants follow-up.  She will let us know if she would like us to assist with follow-up here in [place].   Again, I would be comfortable prescribing her levothyroxine if her TSH is in the normal range, however if it is below the normal range, that this is not something I would be comfortable with and I will defer to her provider in [place]. Pt verbalized understanding. We will see her again as needed. She will let us know.     Start time at 2:35. Stop time at 2:50, documentation time 5 minutes, total time of 20 minutes

## 2021-07-16 ENCOUNTER — VIRTUAL VISIT (OUTPATIENT)
Dept: ENDOCRINOLOGY | Facility: CLINIC | Age: 51
End: 2021-07-16
Payer: COMMERCIAL

## 2021-07-16 DIAGNOSIS — R53.83 OTHER FATIGUE: Primary | ICD-10-CM

## 2021-07-16 DIAGNOSIS — L65.9 ALOPECIA: ICD-10-CM

## 2021-07-16 PROCEDURE — 99213 OFFICE O/P EST LOW 20 MIN: CPT | Mod: GT | Performed by: INTERNAL MEDICINE

## 2021-07-16 NOTE — LETTER
7/16/2021       RE: Lupe Deluna  [address] [place] [address]     Dear Colleague,    Thank you for referring your patient, Lupe Deluna, to the [facility] ENDOCRINOLOGY CLINIC [place] at [facility]. Please see a copy of my visit note below.    Lupe Deluna  is being evaluated via a billable video visit.      How would you like to obtain your AVS? Triciahart  For the video visit, send the invitation by: [email or web address]  Will anyone else be joining your video visit? No        Lupe Deluna  is being evaluated via a billable video visit.      How would you like to obtain your AVS? Triciahart  For the video visit, send the invitation by: Triciahart  Will anyone else be joining your video visit? Carla Andrade is a 50 year old female presents today for Video Visit (endocrine)    7/16/21    Start time at 2:35. Stop time at 2:50, documentation time 5 minutes, total time of 20 minutes    HPI  #1 thyroid concerns  The patient had a history of hair loss starting in April 2020.  In December 2020, she was noted to have a TSH of 0.01, free T3 of 6.2, free T4 2.1.  Per patient report, she was on biotin at that time but stopped her biotin for about 2 days before hand.  She denies any history of scan or uptake.  She did have thyroid ultrasound which did not identify any nodules but was noted to be quite heterogeneous.  She did not have any scan or uptake.  She was presumptively put on methimazole at 10 mg daily.  She was seen by Dr. Thapa at [facility] (January 2021, Feb 2021) and then decided to transfer her care to the [facility].  She was seen by my colleague Dr. Singleton (March 2021) for which the decision was to follow her thyroid function test, taper her methimazole, and eventually stopped her methimazole completely in March 2021.    The patient reports that despite normalization of her TFTs and stopping the methimazole, she  "continues to have hair loss and issues with fatigue and memory.  She reports that when she was on methimazole, perhaps her \"shoulder pain\" was better.  She does report that her shoulder pain has appeared to be worse since stopping the methimazole.  Of note, her TSI and thyrotropin receptor antibodies were negative.    Interval history: April 2021 TSH was 1.23, free T4 was 0.98.  I recommend observation.  The patient went to a provider referred by her friend (provider [place]) and was started on generic levothyroxine at 75 mcg daily.  She has been on this since roughly June 2021.  Since being on the levothyroxine 75 mcg daily, the patient reports a marked improvement in her fatigue, hair loss, and memory.  She has been very satisfied.  She reports that her recheck TFTs will be in August 2021.    #2 fatigue  The patient reports a history of fatigue.  She goes to sleep generally around 9 PM and wakes up around 5 AM.  She reports that she sometimes awakens 1-2 times per night.  She reports that she is tired when she is awake.  She denies snoring.  She is never had a sleep evaluation.     Interval history: April 2021 TFTs were normal.  June 2021 ACTH stim test was normal.    #3 hair loss  This is her most pressing concern.  This started in roughly April 2020.  She has lost her hair completely from her body (scalp, eyelashes, eyebrows, axillary region).  She has been evaluated by 2 dermatologist.  She reports receiving steroids in January 2021 (60 mg daily-tapering off over 6 weeks) which did not clearly improve her hair loss.  She was also temporarily started on methimazole (see issue #1) which also did not help her hair loss.    Interval history: Patient started on levothyroxine 75 mcg daily in roughly June 2021 with improvement in her hair and now reports that her hair is actually growing back.    #4 history of poor memory-April 2021 elevated FSH and LH suggestive of postmenopausal state  The patient reports a history " of poor memory for the past year.  This appears to be short-term memory.  She reports needing lists and reminders to make sure she gets her work done.      Of note, the patient reports a history of hysterectomy in 2015 with her ovaries remaining.  She reports a history of bioidentical hormone use (estrogen, testosterone, done by Stella Resendiz, nurse practitioner) which did not clearly seem to help her memory or her energy levels.  However she stopped this as of March 2021.    Interval history: The patient reports that her memory has also improved with initiation of levothyroxine in June 2021.  Labs from April 2021 with elevated FSH and LH suggest that she is in the post menopausal state.    #5 elevated hemoglobin A1c of 6.0 noted in April 2021  Hemoglobin A1c noted be slightly higher at 6.0 in April 2021.  Dietary lifestyle changes recommended.    Past Medical History  No past medical history on file.    Allergies  Allergies   Allergen Reactions     Tramadol Itching     Sulfa Drugs Diarrhea     Medications  Current Outpatient Medications   Medication Sig Dispense Refill     ciclopirox (PENLAC) 8 % external solution APPLY TO AFFECTED NAILS EVERY NIGHT AT BEDTIME. WASH OFF ONCE WEEKLY WITH ALCOHOL PAD       cyclobenzaprine (FLEXERIL) 10 MG tablet TAKE 1 TABLET BY MOUTH THREE TIMES DAILY AS NEEDED       levothyroxine (SYNTHROID/LEVOTHROID) 75 MCG tablet Take 75 mcg by mouth every morning       zolpidem (AMBIEN) 10 MG tablet Take 10 mg by mouth       Family History  family history is not on file.  Social History  Social History     Socioeconomic History     Marital status: Single     Spouse name: Not on file     Number of children: Not on file     Years of education: Not on file     Highest education level: Not on file   Occupational History     Not on file   Tobacco Use     Smoking status: Former Smoker     Smokeless tobacco: Never Used   Substance and Sexual Activity     Alcohol use: Not on file     Drug use: Not on  "file     Sexual activity: Not on file   Other Topics Concern     Parent/sibling w/ CABG, MI or angioplasty before 65F 55M? Not Asked   Social History Narrative     Not on file     Social Determinants of Health     Financial Resource Strain:      Difficulty of Paying Living Expenses:    Food Insecurity:      Worried About Running Out of Food in the Last Year:      Ran Out of Food in the Last Year:    Transportation Needs:      Lack of Transportation (Medical):      Lack of Transportation (Non-Medical):    Physical Activity:      Days of Exercise per Week:      Minutes of Exercise per Session:    Stress:      Feeling of Stress :    Social Connections:      Frequency of Communication with Friends and Family:      Frequency of Social Gatherings with Friends and Family:      Attends Hoahaoism Services:      Active Member of Clubs or Organizations:      Attends Club or Organization Meetings:      Marital Status:    Intimate Partner Violence:      Fear of Current or Ex-Partner:      Emotionally Abused:      Physically Abused:      Sexually Abused:        ROS  Per HPI      Physical Exam  GENERAL: Healthy, alert and no distress\",\"EYES: Eyes grossly normal to inspection.  No discharge or erythema, or obvious scleral/conjunctival abnormalities.\",\"RESP: No audible wheeze, cough, or visible cyanosis.  No visible retractions or increased work of breathing.  \",\"SKIN: Visible skin clear. No significant rash, abnormal pigmentation or lesions. Noted alopecia\",\"NEURO: Cranial nerves grossly intact.  Mentation and speech appropriate for age.\",\"PSYCH: Mentation appears normal, affect normal/bright, judgement and insight intact, normal speech and appearance well-groomed.    RESULTS  No visits with results within 1 Week(s) from this visit.   Latest known visit with results is:   Infusion Therapy Visit on 06/03/2021   Component Date Value Ref Range Status     Adrenal Corticotropin 06/03/2021 23  <47 pg/mL Final     Renin Activity " 2021 0.6  ng/mL/hr Final    Comment: (Note)  INTERPRETIVE INFORMATION: Renin Activity  Adult, Normal sodium diet:   Supine ................. 0.2-1.6 ng/mL/hr   Upright ................ 0.5-4.0 ng/mL/hr  Children, Normal sodium diet, Supine:    (1-7 days) ..... 2.0-35.0 ng/mL/hr   Cord blood ............. 4.0-32.0 ng/mL/hr   1-12 mos ............... 2.4-37.0 ng/mL/hr   13 mos-3 yrs ........... 1.7-11.2 ng/mL/hr   4-5 yrs ................ 1.0- 6.5 ng/mL/hr   6-10 yrs ............... 0.5- 5.9 ng/mL/hr   11-15 yrs .............. 0.5- 3.3 ng/mL/hr  Children, normal sodium diet, Upright:   0-3 yrs ................ Not Available   4-5 yrs ................ Less than or equal to 15 ng/mL/hr   6-10 yrs ............... Less than or equal to 17 ng/mL/hr   11-15 yrs .............. Less than or equal to 16 ng/mL/hr  Plasma renin activity measures enzyme ability to convert   angiotensinogen to angiotensin I and is limited by the   availability of angiotensinogen. Plasma renin activity is   not an accurate indicator of enzyme acti                           vity when   angiotensinogen is decreased.  This test was developed and its performance characteristics   determined by [facility]. It has not been cleared or   approved by the US Food and Drug Administration. This test   was performed in a CLIA certified laboratory and is   intended for clinical purposes.  Performed By: [facility]  [address] [place] [id number]  : Peg Alberts MD       Cortisol Stimulation Baseline 2021 6.5  4 - 22 ug/dL Final    Comment: 8 AM Cortisol Reference Range = 4-22 ug/dL  4 PM Cortisol Reference Range = 3-17 ug/dL       Cortisol Stimulation Post 30 2021 26.6  >20 ug/dL Final    Comment: Peak serum cortisol should be greater than 20 ug/dL 30-60 minutes post   stimulation.       Cortisol Stimulation Post 60 2021 20.4  >20 ug/dL Final    Comment: Peak serum cortisol should be  greater than 20 ug/dL 30-60 minutes post   stimulation.         Component      Latest Ref Rng & Units 4/21/2021   25 OH Vit D2      ug/L <5   25 OH Vit D3      ug/L 67   25 OH Vit D total      20 - 75 ug/L <72   Tissue Transglutaminase Antibody IgA      <7 U/mL <1   Tissue Transglutaminase Bernice IgG      <7 U/mL <1   Cortisol Serum      4 - 22 ug/dL 7.4   Thyroid Peroxidase Antibody      <35 IU/mL <10   TSH      0.40 - 4.00 mU/L 1.23   T4 Free      0.76 - 1.46 ng/dL 0.98   Triiodothyronine (T3)      60 - 181 ng/dL 142   Thyroglobulin Antibody      <40 IU/mL <20   FSH      IU/L 50.4   Lutropin      IU/L 31.5   Hemoglobin A1C      0 - 5.6 % 6.0 (H)       ASSESSMENT:    #1 thyroid concerns  The patient was started on levothyroxine 75 mcg daily by an outside provider.  Since being on this medication since roughly June 2021, the patient reports a marked improvement in her symptoms including her energy, hair, fatigue, and overall wellbeing.  She also reports that her nails are growing back.  She has been very satisfied with this program.    I did discuss with the patient that if her recheck TSH is normal (although it can be on the lower end of the normal range), I could refill her levothyroxine at 75 mcg daily and follow her here in [place].  If however her recheck TSH is suppressed, (below normal range), then I would defer follow-up to her provider in [place].    I did discuss with the patient that she could consider brand-name Synthroid (instead of generic levothyroxine) to help improve the consistency of the thyroid hormone administration.    #2 fatigue  ACTH stim test was normal.  Sleep evaluation not done.  However patient reports that her fatigue had markedly improved on levothyroxine at 75 mcg daily.    #3 hair loss  Per patient report, her hair is now starting to grow back on levothyroxine at 75 mcg daily.    #4 history of poor memory-April 2021 elevated FSH and LH suggestive of postmenopausal state  Per patient  report, her memory is also not improving on levothyroxine 75 mcg daily.    #5 elevated hemoglobin A1c of 6.0 noted in April 2021  Per primary provider.     I am glad the patient is overall feeling better.  I will let the patient decide on how she wants follow-up.  She will let us know if she would like us to assist with follow-up here in [place].  Again, I would be comfortable prescribing her levothyroxine if her TSH is in the normal range, however if it is below the normal range, that this is not something I would be comfortable with and I will defer to her provider in [place]. Pt verbalized understanding. We will see her again as needed. She will let us know.     Start time at 2:35. Stop time at 2:50, documentation time 5 minutes, total time of 20 minutes    Again, thank you for allowing me to participate in the care of your patient.      Sincerely,    Yolanda Schulte MD

## 2021-07-18 DIAGNOSIS — R73.9 ELEVATED RANDOM BLOOD GLUCOSE LEVEL: Primary | ICD-10-CM

## 2021-07-19 ENCOUNTER — TELEPHONE (OUTPATIENT)
Dept: INTERNAL MEDICINE | Facility: CLINIC | Age: 51
End: 2021-07-19

## 2021-07-19 NOTE — TELEPHONE ENCOUNTER
Dr. Schulte (endocrinology) asked me to follow up on this patient's A1C (it was ordered by Dr. Schulte).  I am happy to do so.  Please ask the patient to schedule a follow up appointment with me in about 2-3 months and have her do an A1C again at that time.  Lifestyle modifications in the meantime (sensible diet, vigorous exercise, wt. Loss if applies, etc.). I will have orders available.  She can have a virtual visit if she is comfortable with that.  Otherwise, she can follow up in person with me or one of the Tuesday afternoon IM residents.  Thanks. SB    Pt called and she will call back when she is ready to come into clinic. Pt will call.  Macrella Barker RN 8:19 AM on 7/19/2021.

## 2021-07-20 ENCOUNTER — HOSPITAL ENCOUNTER (OUTPATIENT)
Facility: AMBULATORY SURGERY CENTER | Age: 51
End: 2021-07-20
Attending: STUDENT IN AN ORGANIZED HEALTH CARE EDUCATION/TRAINING PROGRAM
Payer: COMMERCIAL

## 2021-07-20 DIAGNOSIS — Z11.59 ENCOUNTER FOR SCREENING FOR OTHER VIRAL DISEASES: ICD-10-CM

## 2021-07-28 ENCOUNTER — TELEPHONE (OUTPATIENT)
Dept: GASTROENTEROLOGY | Facility: OUTPATIENT CENTER | Age: 51
End: 2021-07-28

## 2021-07-28 DIAGNOSIS — Z11.59 ENCOUNTER FOR SCREENING FOR OTHER VIRAL DISEASES: ICD-10-CM

## 2021-08-12 ENCOUNTER — TELEPHONE (OUTPATIENT)
Dept: GASTROENTEROLOGY | Facility: OUTPATIENT CENTER | Age: 51
End: 2021-08-12

## 2021-08-12 NOTE — TELEPHONE ENCOUNTER
Is patient taking blood thinners/antiplatelet medications? No     Heart Disease? Denies     Lung Disease? Denies     Sleep Apnea? Denies     Diabetic? Denies     Kidney Disease? Denies     Electronic Implantable Devices? Denies     PTSD? N/a    Prep instructions reviewed with patient? Instructions,  policy, MAC sedation plan reviewed. Instructed patient to have someone stay with them for 24 hours post exam    : Yes    Pharmacy: N/a    Indication for Procedure: Screening colonoscopy    Referring Provider: Katerina Guerrero MD    Arrival Time: 7 AM    COVID test? 8-17 M [facility]    Karime Berman RN

## 2021-08-17 ENCOUNTER — LAB (OUTPATIENT)
Dept: LAB | Facility: CLINIC | Age: 51
End: 2021-08-17
Payer: COMMERCIAL

## 2021-08-17 DIAGNOSIS — E05.90 HYPERTHYROIDISM: ICD-10-CM

## 2021-08-17 DIAGNOSIS — Z11.59 ENCOUNTER FOR SCREENING FOR OTHER VIRAL DISEASES: ICD-10-CM

## 2021-08-17 DIAGNOSIS — R53.83 OTHER FATIGUE: ICD-10-CM

## 2021-08-17 LAB — SARS-COV-2 RNA RESP QL NAA+PROBE: NEGATIVE

## 2021-08-17 PROCEDURE — U0003 INFECTIOUS AGENT DETECTION BY NUCLEIC ACID (DNA OR RNA); SEVERE ACUTE RESPIRATORY SYNDROME CORONAVIRUS 2 (SARS-COV-2) (CORONAVIRUS DISEASE [COVID-19]), AMPLIFIED PROBE TECHNIQUE, MAKING USE OF HIGH THROUGHPUT TECHNOLOGIES AS DESCRIBED BY CMS-2020-01-R: HCPCS

## 2021-08-17 PROCEDURE — U0005 INFEC AGEN DETEC AMPLI PROBE: HCPCS

## 2021-08-20 ENCOUNTER — APPOINTMENT (OUTPATIENT)
Dept: GASTROENTEROLOGY | Facility: OUTPATIENT CENTER | Age: 51
End: 2021-08-20
Payer: COMMERCIAL

## 2021-08-20 ENCOUNTER — TRANSFERRED RECORDS (OUTPATIENT)
Dept: HEALTH INFORMATION MANAGEMENT | Facility: CLINIC | Age: 51
End: 2021-08-20

## 2021-08-25 DIAGNOSIS — R53.83 FATIGUE: ICD-10-CM

## 2021-08-25 DIAGNOSIS — R73.09 ELEVATED GLUCOSE: ICD-10-CM

## 2021-08-25 DIAGNOSIS — E05.90 HYPERTHYROIDISM: Primary | ICD-10-CM

## 2021-08-25 DIAGNOSIS — R73.09 ELEVATED HEMOGLOBIN A1C: ICD-10-CM

## 2021-10-21 ENCOUNTER — APPOINTMENT (OUTPATIENT)
Dept: URBAN - METROPOLITAN AREA CLINIC 259 | Age: 51
Setting detail: DERMATOLOGY
End: 2021-10-21

## 2021-10-21 DIAGNOSIS — L63.0 ALOPECIA (CAPITIS) TOTALIS: ICD-10-CM

## 2021-10-21 DIAGNOSIS — D22 MELANOCYTIC NEVI: ICD-10-CM

## 2021-10-21 DIAGNOSIS — D18.0 HEMANGIOMA: ICD-10-CM

## 2021-10-21 DIAGNOSIS — B35.1 TINEA UNGUIUM: ICD-10-CM

## 2021-10-21 DIAGNOSIS — L82.1 OTHER SEBORRHEIC KERATOSIS: ICD-10-CM

## 2021-10-21 DIAGNOSIS — L57.8 OTHER SKIN CHANGES DUE TO CHRONIC EXPOSURE TO NONIONIZING RADIATION: ICD-10-CM

## 2021-10-21 DIAGNOSIS — L71.8 OTHER ROSACEA: ICD-10-CM

## 2021-10-21 PROBLEM — D18.01 HEMANGIOMA OF SKIN AND SUBCUTANEOUS TISSUE: Status: ACTIVE | Noted: 2021-10-21

## 2021-10-21 PROBLEM — D22.5 MELANOCYTIC NEVI OF TRUNK: Status: ACTIVE | Noted: 2021-10-21

## 2021-10-21 PROCEDURE — OTHER MIPS QUALITY: OTHER

## 2021-10-21 PROCEDURE — 99213 OFFICE O/P EST LOW 20 MIN: CPT

## 2021-10-21 PROCEDURE — OTHER PRESCRIPTION: OTHER

## 2021-10-21 PROCEDURE — OTHER COUNSELING: OTHER

## 2021-10-21 RX ORDER — TERBINAFINE HYDROCHLORIDE 250 MG/1
TABLET ORAL
Qty: 30 | Refills: 0 | Status: ERX | COMMUNITY
Start: 2021-10-21

## 2021-10-21 ASSESSMENT — LOCATION DETAILED DESCRIPTION DERM
LOCATION DETAILED: RIGHT MEDIAL FOREHEAD
LOCATION DETAILED: RIGHT MEDIAL UPPER BACK
LOCATION DETAILED: RIGHT DORSAL 2ND TOE
LOCATION DETAILED: RIGHT SUPERIOR UPPER BACK
LOCATION DETAILED: RIGHT CENTRAL EYEBROW
LOCATION DETAILED: LEFT MEDIAL MALAR CHEEK
LOCATION DETAILED: SUBXIPHOID
LOCATION DETAILED: LEFT LATERAL 3RD TOE
LOCATION DETAILED: MIDDLE STERNUM
LOCATION DETAILED: RIGHT INFERIOR MEDIAL UPPER BACK
LOCATION DETAILED: LOWER STERNUM
LOCATION DETAILED: INFERIOR THORACIC SPINE
LOCATION DETAILED: EPIGASTRIC SKIN
LOCATION DETAILED: RIGHT MEDIAL MALAR CHEEK
LOCATION DETAILED: RIGHT NASAL SIDEWALL

## 2021-10-21 ASSESSMENT — LOCATION SIMPLE DESCRIPTION DERM
LOCATION SIMPLE: LEFT CHEEK
LOCATION SIMPLE: RIGHT NOSE
LOCATION SIMPLE: RIGHT FOREHEAD
LOCATION SIMPLE: RIGHT CHEEK
LOCATION SIMPLE: RIGHT 2ND TOE
LOCATION SIMPLE: ABDOMEN
LOCATION SIMPLE: CHEST
LOCATION SIMPLE: LEFT 3RD TOE
LOCATION SIMPLE: RIGHT UPPER BACK
LOCATION SIMPLE: UPPER BACK
LOCATION SIMPLE: RIGHT EYEBROW

## 2021-10-21 ASSESSMENT — LOCATION ZONE DERM
LOCATION ZONE: TOE
LOCATION ZONE: FACE
LOCATION ZONE: NOSE
LOCATION ZONE: TRUNK

## 2021-10-21 NOTE — PROCEDURE: COUNSELING
Detail Level: Generalized
Detail Level: Detailed
Patient Specific Counseling (Will Not Stick From Patient To Patient): She is taking Synthroid QD and feels this is helping.  She is seen at [facility] as well for this.
Detail Level: Zone

## 2021-10-21 NOTE — PROCEDURE: MIPS QUALITY
Quality 431: Preventive Care And Screening: Unhealthy Alcohol Use - Screening: Patient not identified as an unhealthy alcohol user when screened for unhealthy alcohol use using a systematic screening method
Detail Level: Detailed
Quality 226: Preventive Care And Screening: Tobacco Use: Screening And Cessation Intervention: Patient screened for tobacco use and is an ex/non-smoker
Quality 110: Preventive Care And Screening: Influenza Immunization: Influenza Immunization Ordered or Recommended, but not Administered due to system reason
Quality 130: Documentation Of Current Medications In The Medical Record: Current Medications Documented

## 2021-10-23 ENCOUNTER — HEALTH MAINTENANCE LETTER (OUTPATIENT)
Age: 51
End: 2021-10-23

## 2021-11-29 ENCOUNTER — OFFICE VISIT (OUTPATIENT)
Dept: DERMATOLOGY | Facility: CLINIC | Age: 51
End: 2021-11-29
Payer: COMMERCIAL

## 2021-11-29 VITALS — SYSTOLIC BLOOD PRESSURE: 127 MMHG | DIASTOLIC BLOOD PRESSURE: 79 MMHG

## 2021-11-29 DIAGNOSIS — L63.1 ALOPECIA UNIVERSALIS: Primary | ICD-10-CM

## 2021-11-29 DIAGNOSIS — M35.9 AUTOIMMUNE DISEASE (H): ICD-10-CM

## 2021-11-29 DIAGNOSIS — L73.9 FOLLICULITIS: ICD-10-CM

## 2021-11-29 PROCEDURE — 99214 OFFICE O/P EST MOD 30 MIN: CPT | Performed by: DERMATOLOGY

## 2021-11-29 RX ORDER — LEVOTHYROXINE SODIUM 100 MCG
100 TABLET ORAL EVERY MORNING
COMMUNITY
Start: 2021-10-06 | End: 2022-11-14

## 2021-11-29 ASSESSMENT — PAIN SCALES - GENERAL: PAINLEVEL: NO PAIN (0)

## 2021-11-29 NOTE — NURSING NOTE
Dermatology Rooming Note    Lupe Deluna's goals for this visit include:   Chief Complaint   Patient presents with     Hair Loss     follow up on alopecia universalis, states that she has noticed texture on the scalp     Claudine Mondragon CMA on 11/29/2021 at 8:07 AM

## 2021-11-29 NOTE — PROGRESS NOTES
[facility] Dermatology Note  Encounter Date: Nov 29, 2021  Office Visit     Dermatology Problem List:  1. 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 Scalp folliculitis - DHS Zinc  ____________________________________________    Assessment & Plan:     # Alopecia universalis  Punch bx 9/24/2020, c/w alopecia areata (in Media tab). Possible component of telogen effluvium.  - Pt currently being treated with synthroid by a physician in [place] who has successfully treated alopecia areata with thyroid replacement even in the absence of thyroid abnormalities.  - Discussed clinical trial enrollment with pt, but she is uninterested in this time  - not doing any topical therapies  - Plan to use DHS Zinc shampoo for scalp maintenance.   - consider Xeljanz in the future, provided patient with Journal article regarding safety and efficacy of Xeljanz    Procedures Performed:   None    Follow-up: 6 month(s) in-person, or earlier for new or changing lesions    Staff and Medical Student:     Darwin Munoz, MS3  [facility]     Staff Physician:  I was present with the medical student who participated in the service and in the documentation of the note. I have verified the history and personally performed the physical exam and medical decision making. I agree with the assessment and plan of care as documented in the note.       Zainab Oliver MD  Professor and Chair  Department of Dermatology  [facility]: Phone: [phone], Fax:[phone]  [facility]: Phone: [phone], Fax: [phone]        ____________________________________________    CC: No chief complaint on file.    HPI:  Ms. Lupe Deluna is a 51 year old  "female who presents as a return patient for follow-up of hair loss, diagnosed as alopecia universalis. Not treating. No hair regowth on scalp, face or body. She has restarted her testosterone, estrogen, and progesterone cream. Pt reports she is working with her primary care physician in [place] to manage her synthroid. She is currently taking 125 mcg of synthroid, 1000 unit(s) of vitamin D per day. She reports she noticed nail growth and feeling better overall after starting the synthroid.   - Last seen in-clinic on 6/8/2021  - Shedding or thinning, or both: Both  - Current tx: Pt currently taking levothyroxine. No other treatments at this time.   - Scalp or hair care habits/products: Pt showering and washing hair every other day and using biolage and kendall owens tea tree shampoo.   Ingredients:   Aqua (Water, Eau), Sodium Laureth Sulfate, Sodium Lauryl Sulfate, Cocamide MIPA, Parfum (Fragrance), Cocamidopropyl Betaine, Panthenol, Melaleuca Alternifolia (Tea Tree) Leaf Oil, Menthol, Hedychium Coronarium (White Kelle) Flower/?Leaf/?Stem Extract, Triticum Vulgare (Wheat) Germ Oil, Algae Extract, Simmondsia Chinensis (Jojoba) Leaf Extract, Aloe Barbadensis Leaf Extract, Anthemis Nobilis Flower Extract, Rosmarinus Officinalis (Rosemary) Leaf Extract, Glycerin, Oleamidopropyl Betaine, Propylene Glycol, Glycol Stearate, PEG-150 Distearate, Citric Acid, Tetrasodium EDTA, Polyquaternium-7, Bisamino PEG/?PPG-41/?3 Aminoethyl Pg-Propyl Dimethicone, PEG-12 Dimethicone, Methylchloroisothiazolinone, Methylisothiazolinone, Magnesium Chloride, Magnesium Nitrate, Limonene, Linalool, Citronellol, Ci 48485 (Blue 1), Ci 79242 (Yellow 5)    Yes Any new medications, supplements, or products? (please list below)   - Pt restarted \"bioidentical hormones\" five weeks ago. A cream with testosterone, estrogen and progesterone.   - Multivitamin    No Scalp pain   No Scalp burning   Yes Scalp itching    Yes Eyebrow changes    Yes Eyelash " changes   No Riddle changes    Yes Other body hair changes    Yes Nail changes    No Additional symptoms? (please list below)     - Overall course:   Patient is otherwise feeling well, in usual state of health, and has no additional skin concerns today.     Labs:  Vitamin D reviewed.    Physical Exam:  Vitals: There were no vitals taken for this visit.  GEN: Well developed, well-nourished, in no acute distress, in a pleasant mood.    SKIN: Focused examination of scalp, face and hair was performed.  - Latif type: 1  - Jori part width of 0  - The layers of hair regrowth layers were noted: No hair present.   - No diffuse erythema   - Mild perifollicular erythema  - No perifollicular scale   - No scaling of the scalp   - No negative hair pull test   - Eyelashes absent  - Microblading done on eyebrows. No hair seen.   - Nail pitting present on distal portion of all finger nails.   - Mild scalp folliculitis/pustules   - No other lesions of concern on areas examined.     Medications:  Current Outpatient Medications   Medication     ciclopirox (PENLAC) 8 % external solution     cyclobenzaprine (FLEXERIL) 10 MG tablet     levothyroxine (SYNTHROID/LEVOTHROID) 75 MCG tablet     zolpidem (AMBIEN) 10 MG tablet     No current facility-administered medications for this visit.      Past Medical History:   Patient Active Problem List   Diagnosis     Fatigue     No past medical history on file.    CC Referred Self, MD  No address on file on close of this encounter.

## 2021-11-29 NOTE — LETTER
11/29/2021       RE: Lupe Deluna  [address] [facility] [address]     Dear Colleague,    Thank you for referring your patient, Lupe Deluna, to the [facility] DERMATOLOGY CLINIC [place] at [facility]. Please see a copy of my visit note below.    See completed note.    [facility] Dermatology Note  Encounter Date: Nov 29, 2021  Office Visit     Dermatology Problem List:  1. 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 Scalp folliculitis - DHS Zinc  ____________________________________________    Assessment & Plan:     # Alopecia universalis  Punch bx 9/24/2020, c/w alopecia areata (in Media tab). Possible component of telogen effluvium.  - Pt currently being treated with synthroid by a physician in [place] who has successfully treated alopecia areata with thyroid replacement even in the absence of thyroid abnormalities.  - Discussed clinical trial enrollment with pt, but she is uninterested in this time  - not doing any topical therapies  - Plan to use DHS Zinc shampoo for scalp maintenance.   - consider Xeljanz in the future, provided patient with Journal article regarding safety and efficacy of Xeljanz    Procedures Performed:   None    Follow-up: 6 month(s) in-person, or earlier for new or changing lesions    Staff and Medical Student:     Darwin Munoz, MS3  [facility]     Staff Physician:  I was present with the medical student who participated in the service and in the documentation of the note. I have verified the history and personally performed the physical exam and medical decision making. I agree with the assessment and plan of care as documented in the note.       Zainab Oliver MD  Professor and Chair  Department of  Dermatology  [facility]: Phone: [phone], Fax:[phone]  [facility]: Phone: [phone], Fax: [phone]        ____________________________________________    CC: No chief complaint on file.    HPI:  Ms. Lupe Deluna is a 51 year old female who presents as a return patient for follow-up of hair loss, diagnosed as alopecia universalis. Not treating. No hair regowth on scalp, face or body. She has restarted her testosterone, estrogen, and progesterone cream. Pt reports she is working with her primary care physician in [place] to manage her synthroid. She is currently taking 125 mcg of synthroid, 1000 unit(s) of vitamin D per day. She reports she noticed nail growth and feeling better overall after starting the synthroid.   - Last seen in-clinic on 6/8/2021  - Shedding or thinning, or both: Both  - Current tx: Pt currently taking levothyroxine. No other treatments at this time.   - Scalp or hair care habits/products: Pt showering and washing hair every other day and using biolage and kendall owens tea tree shampoo.   Ingredients:   Aqua (Water, Eau), Sodium Laureth Sulfate, Sodium Lauryl Sulfate, Cocamide MIPA, Parfum (Fragrance), Cocamidopropyl Betaine, Panthenol, Melaleuca Alternifolia (Tea Tree) Leaf Oil, Menthol, Hedychium Coronarium (White Kelle) Flower/?Leaf/?Stem Extract, Triticum Vulgare (Wheat) Germ Oil, Algae Extract, Simmondsia Chinensis (Jojoba) Leaf Extract, Aloe Barbadensis Leaf Extract, Anthemis Nobilis Flower Extract, Rosmarinus Officinalis (Rosemary) Leaf Extract, Glycerin, Oleamidopropyl Betaine, Propylene Glycol, Glycol Stearate, PEG-150 Distearate, Citric Acid, Tetrasodium EDTA, Polyquaternium-7, Bisamino PEG/?PPG-41/?3 Aminoethyl Pg-Propyl Dimethicone, PEG-12 Dimethicone, Methylchloroisothiazolinone, Methylisothiazolinone, Magnesium Chloride, Magnesium Nitrate, Limonene,  "Linalool, Citronellol, Ci 08182 (Blue 1), Ci 22218 (Yellow 5)    Yes Any new medications, supplements, or products? (please list below)   - Pt restarted \"bioidentical hormones\" five weeks ago. A cream with testosterone, estrogen and progesterone.   - Multivitamin    No Scalp pain   No Scalp burning   Yes Scalp itching    Yes Eyebrow changes    Yes Eyelash changes   No Beard changes    Yes Other body hair changes    Yes Nail changes    No Additional symptoms? (please list below)     - Overall course:   Patient is otherwise feeling well, in usual state of health, and has no additional skin concerns today.     Labs:  Vitamin D reviewed.    Physical Exam:  Vitals: There were no vitals taken for this visit.  GEN: Well developed, well-nourished, in no acute distress, in a pleasant mood.    SKIN: Focused examination of scalp, face and hair was performed.  - Latif type: 1  - Jori part width of 0  - The layers of hair regrowth layers were noted: No hair present.   - No diffuse erythema   - Mild perifollicular erythema  - No perifollicular scale   - No scaling of the scalp   - No negative hair pull test   - Eyelashes absent  - Microblading done on eyebrows. No hair seen.   - Nail pitting present on distal portion of all finger nails.   - Mild scalp folliculitis/pustules   - No other lesions of concern on areas examined.     Medications:  Current Outpatient Medications   Medication     ciclopirox (PENLAC) 8 % external solution     cyclobenzaprine (FLEXERIL) 10 MG tablet     levothyroxine (SYNTHROID/LEVOTHROID) 75 MCG tablet     zolpidem (AMBIEN) 10 MG tablet     No current facility-administered medications for this visit.      Past Medical History:   Patient Active Problem List   Diagnosis     Fatigue     No past medical history on file.    CC Referred Self, MD  No address on file on close of this encounter.      Again, thank you for allowing me to participate in the care of your patient.      Sincerely,    Zainab" Maria Esther Oliver MD

## 2021-12-02 ENCOUNTER — APPOINTMENT (OUTPATIENT)
Dept: URBAN - METROPOLITAN AREA CLINIC 259 | Age: 51
Setting detail: DERMATOLOGY
End: 2021-12-02

## 2021-12-02 DIAGNOSIS — B35.1 TINEA UNGUIUM: ICD-10-CM

## 2021-12-02 PROCEDURE — 36415 COLL VENOUS BLD VENIPUNCTURE: CPT

## 2021-12-02 PROCEDURE — OTHER COUNSELING: OTHER

## 2021-12-02 PROCEDURE — OTHER MIPS QUALITY: OTHER

## 2021-12-02 PROCEDURE — 99212 OFFICE O/P EST SF 10 MIN: CPT | Mod: 25

## 2021-12-02 PROCEDURE — OTHER PRESCRIPTION: OTHER

## 2021-12-02 PROCEDURE — OTHER VENIPUNCTURE: OTHER

## 2021-12-02 PROCEDURE — OTHER ORDER TESTS: OTHER

## 2021-12-02 RX ORDER — TERBINAFINE HYDROCHLORIDE 250 MG/1
TABLET ORAL
Qty: 30 | Refills: 1 | Status: ERX

## 2021-12-02 ASSESSMENT — LOCATION ZONE DERM: LOCATION ZONE: TOENAIL

## 2021-12-02 ASSESSMENT — LOCATION DETAILED DESCRIPTION DERM: LOCATION DETAILED: LEFT GREAT TOENAIL

## 2021-12-02 ASSESSMENT — LOCATION SIMPLE DESCRIPTION DERM: LOCATION SIMPLE: LEFT GREAT TOE

## 2021-12-02 NOTE — PROCEDURE: VENIPUNCTURE
Bill 63851 For Specimen Handling/Conveyance To Laboratory?: no
Detail Level: None
Number Of Tubes Drawn: 1
Venipuncture Paragraph: An alcohol pad was applied to the venipuncture site. Venipuncture was performed using a butterfly needle. Pressure and a bandaid was applied to the site. No complications were noted.

## 2021-12-02 NOTE — PROCEDURE: MIPS QUALITY
Quality 110: Preventive Care And Screening: Influenza Immunization: Influenza Immunization Ordered or Recommended, but not Administered due to system reason
Detail Level: Detailed
Quality 226: Preventive Care And Screening: Tobacco Use: Screening And Cessation Intervention: Patient screened for tobacco use and is an ex/non-smoker
Quality 130: Documentation Of Current Medications In The Medical Record: Current Medications Documented
Quality 431: Preventive Care And Screening: Unhealthy Alcohol Use - Screening: Patient not identified as an unhealthy alcohol user when screened for unhealthy alcohol use using a systematic screening method

## 2022-04-09 ENCOUNTER — HEALTH MAINTENANCE LETTER (OUTPATIENT)
Age: 52
End: 2022-04-09

## 2022-05-16 ENCOUNTER — OFFICE VISIT (OUTPATIENT)
Dept: DERMATOLOGY | Facility: CLINIC | Age: 52
End: 2022-05-16
Payer: COMMERCIAL

## 2022-05-16 ENCOUNTER — LAB (OUTPATIENT)
Dept: LAB | Facility: CLINIC | Age: 52
End: 2022-05-16

## 2022-05-16 VITALS — SYSTOLIC BLOOD PRESSURE: 136 MMHG | HEART RATE: 56 BPM | DIASTOLIC BLOOD PRESSURE: 86 MMHG

## 2022-05-16 DIAGNOSIS — Z51.81 MEDICATION MONITORING ENCOUNTER: ICD-10-CM

## 2022-05-16 DIAGNOSIS — L63.1 ALOPECIA AREATA UNIVERSALIS: ICD-10-CM

## 2022-05-16 DIAGNOSIS — E05.90 HYPERTHYROIDISM: ICD-10-CM

## 2022-05-16 DIAGNOSIS — R53.83 FATIGUE: ICD-10-CM

## 2022-05-16 DIAGNOSIS — L63.1 ALOPECIA AREATA UNIVERSALIS: Primary | ICD-10-CM

## 2022-05-16 DIAGNOSIS — R73.09 ELEVATED HEMOGLOBIN A1C: ICD-10-CM

## 2022-05-16 LAB
ALBUMIN SERPL-MCNC: 4.1 G/DL (ref 3.4–5)
ALP SERPL-CCNC: 102 U/L (ref 40–150)
ALT SERPL W P-5'-P-CCNC: 25 U/L (ref 0–50)
ANION GAP SERPL CALCULATED.3IONS-SCNC: 5 MMOL/L (ref 3–14)
AST SERPL W P-5'-P-CCNC: 23 U/L (ref 0–45)
BASOPHILS # BLD AUTO: 0.1 10E3/UL (ref 0–0.2)
BASOPHILS NFR BLD AUTO: 1 %
BILIRUB SERPL-MCNC: 0.6 MG/DL (ref 0.2–1.3)
BUN SERPL-MCNC: 14 MG/DL (ref 7–30)
CALCIUM SERPL-MCNC: 9.5 MG/DL (ref 8.5–10.1)
CHLORIDE BLD-SCNC: 112 MMOL/L (ref 94–109)
CHOLEST SERPL-MCNC: 164 MG/DL
CO2 SERPL-SCNC: 28 MMOL/L (ref 20–32)
CREAT SERPL-MCNC: 0.57 MG/DL (ref 0.52–1.04)
EOSINOPHIL # BLD AUTO: 0.2 10E3/UL (ref 0–0.7)
EOSINOPHIL NFR BLD AUTO: 3 %
ERYTHROCYTE [DISTWIDTH] IN BLOOD BY AUTOMATED COUNT: 14.4 % (ref 10–15)
FASTING STATUS PATIENT QL REPORTED: YES
GFR SERPL CREATININE-BSD FRML MDRD: >90 ML/MIN/1.73M2
GLUCOSE BLD-MCNC: 110 MG/DL (ref 70–99)
HBA1C MFR BLD: 5.7 % (ref 0–5.6)
HBV CORE AB SERPL QL IA: NONREACTIVE
HBV SURFACE AB SERPL IA-ACNC: 1.95 M[IU]/ML
HBV SURFACE AG SERPL QL IA: NONREACTIVE
HCT VFR BLD AUTO: 41.7 % (ref 35–47)
HCV AB SERPL QL IA: NONREACTIVE
HDLC SERPL-MCNC: 62 MG/DL
HGB BLD-MCNC: 13 G/DL (ref 11.7–15.7)
HIV 1+2 AB+HIV1 P24 AG SERPL QL IA: NONREACTIVE
IMM GRANULOCYTES # BLD: 0 10E3/UL
IMM GRANULOCYTES NFR BLD: 0 %
LDLC SERPL CALC-MCNC: 76 MG/DL
LYMPHOCYTES # BLD AUTO: 2.6 10E3/UL (ref 0.8–5.3)
LYMPHOCYTES NFR BLD AUTO: 36 %
MCH RBC QN AUTO: 26.2 PG (ref 26.5–33)
MCHC RBC AUTO-ENTMCNC: 31.2 G/DL (ref 31.5–36.5)
MCV RBC AUTO: 84 FL (ref 78–100)
MONOCYTES # BLD AUTO: 0.5 10E3/UL (ref 0–1.3)
MONOCYTES NFR BLD AUTO: 7 %
NEUTROPHILS # BLD AUTO: 3.8 10E3/UL (ref 1.6–8.3)
NEUTROPHILS NFR BLD AUTO: 53 %
NONHDLC SERPL-MCNC: 102 MG/DL
NRBC # BLD AUTO: 0 10E3/UL
NRBC BLD AUTO-RTO: 0 /100
PLATELET # BLD AUTO: 294 10E3/UL (ref 150–450)
POTASSIUM BLD-SCNC: 5.2 MMOL/L (ref 3.4–5.3)
PROT SERPL-MCNC: 7 G/DL (ref 6.8–8.8)
RBC # BLD AUTO: 4.97 10E6/UL (ref 3.8–5.2)
SODIUM SERPL-SCNC: 145 MMOL/L (ref 133–144)
T3 SERPL-MCNC: 117 NG/DL (ref 60–181)
TRIGL SERPL-MCNC: 129 MG/DL
TSH SERPL DL<=0.005 MIU/L-ACNC: 0.8 MU/L (ref 0.4–4)
WBC # BLD AUTO: 7.2 10E3/UL (ref 4–11)

## 2022-05-16 PROCEDURE — 86704 HEP B CORE ANTIBODY TOTAL: CPT | Mod: 90 | Performed by: PATHOLOGY

## 2022-05-16 PROCEDURE — 83036 HEMOGLOBIN GLYCOSYLATED A1C: CPT | Performed by: PATHOLOGY

## 2022-05-16 PROCEDURE — 36415 COLL VENOUS BLD VENIPUNCTURE: CPT | Performed by: PATHOLOGY

## 2022-05-16 PROCEDURE — 86481 TB AG RESPONSE T-CELL SUSP: CPT | Mod: 90 | Performed by: PATHOLOGY

## 2022-05-16 PROCEDURE — 82785 ASSAY OF IGE: CPT | Mod: 90 | Performed by: PATHOLOGY

## 2022-05-16 PROCEDURE — 87389 HIV-1 AG W/HIV-1&-2 AB AG IA: CPT | Mod: 90 | Performed by: PATHOLOGY

## 2022-05-16 PROCEDURE — 87340 HEPATITIS B SURFACE AG IA: CPT | Mod: 90 | Performed by: PATHOLOGY

## 2022-05-16 PROCEDURE — 86803 HEPATITIS C AB TEST: CPT | Mod: 90 | Performed by: PATHOLOGY

## 2022-05-16 PROCEDURE — 99000 SPECIMEN HANDLING OFFICE-LAB: CPT | Performed by: PATHOLOGY

## 2022-05-16 PROCEDURE — 84480 ASSAY TRIIODOTHYRONINE (T3): CPT | Mod: 90 | Performed by: PATHOLOGY

## 2022-05-16 PROCEDURE — 80050 GENERAL HEALTH PANEL: CPT | Performed by: PATHOLOGY

## 2022-05-16 PROCEDURE — 99213 OFFICE O/P EST LOW 20 MIN: CPT | Mod: GC | Performed by: DERMATOLOGY

## 2022-05-16 PROCEDURE — 80061 LIPID PANEL: CPT | Performed by: PATHOLOGY

## 2022-05-16 PROCEDURE — 86706 HEP B SURFACE ANTIBODY: CPT | Mod: 90 | Performed by: PATHOLOGY

## 2022-05-16 RX ORDER — PROGESTERONE 100 MG/1
100 CAPSULE ORAL DAILY
COMMUNITY
Start: 2020-08-27 | End: 2023-10-02

## 2022-05-16 RX ORDER — LIOTHYRONINE SODIUM 5 UG/1
5 TABLET ORAL DAILY
COMMUNITY
Start: 2021-07-31 | End: 2023-08-07

## 2022-05-16 RX ORDER — LEVOTHYROXINE SODIUM 112 UG/1
1 TABLET ORAL DAILY
COMMUNITY
Start: 2022-01-14 | End: 2023-01-17

## 2022-05-16 ASSESSMENT — PAIN SCALES - GENERAL: PAINLEVEL: NO PAIN (0)

## 2022-05-16 NOTE — PATIENT INSTRUCTIONS
Preventive Care:    Breast Cancer Screening: During our visit today, we discussed that it is recommended you receive breast cancer screening. Please call or make an appointment with your primary care provider to discuss this with them. You may also call the [facility] scheduling line ([phone]) to set up a mammography appointment at the Breast Center within the [facility].

## 2022-05-16 NOTE — NURSING NOTE
"Dermatology Rooming Note    Lupe Deluna's goals for this visit include:   Chief Complaint   Patient presents with     Hair Loss     Lupe is here today for hair loss. She states \" my hair is still gone\"       Zainab Miranda, RMGEORGE  "

## 2022-05-16 NOTE — PROGRESS NOTES
[facility] Dermatology Note  Encounter Date: May 16, 2022  Office Visit     Dermatology Problem List:  1. 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 Scalp folliculitis - DHS Zinc  ____________________________________________    Assessment & Plan:     # Alopecia universalis, worsening  Punch bx 9/24/2020, c/w alopecia areata (in Media tab). Possible component of telogen effluvium.  Discussed CANDIDA inhibitors such as Xeljanz, including risks, benefits, and expectations of treatment. Patient is concerned about risk of cancer, we discussed clinical research being done currently. Reviewed the mechanism of autoimmune disorders and how this is present in alopecia areata. Plan to start safety labs today and continue to consider Xeljanz in the future, pending results.   - Currently being treated with synthroid by PCP, reports improvement in systemic symptoms despite normal TSH  - Labs ordered: lipids, hepatic panel, CBC, CMP, Hep B, Hep C, HIV, Quant gold, IgE  - If labs within normal limits, will send prescription for Xeljanz and wait for prior auth  - Not doing any topical therapies  - Patient did not have improvement in ILK injections, not interested at this time.  - Continue DHS Zinc shampoo for scalp maintenance.     Procedures Performed:   None    Follow-up: 4 month(s) in-person, or earlier for new or changing lesions    Staff and Resident:      Estefani Sahu MD  PGY-5 Medicine-Dermatology  Pager [phone]      Scribe Disclosure:  I, Nadine Dunlap, am serving as a scribe to document services personally performed by Zainab Oliver MD based on data collection and the provider's statements to me.     Provider Disclosure:   The documentation recorded by the scribe accurately reflects the services  "I personally performed and the decisions made by me.    Patient was seen and examined with the medicine/dermatology resident. I agree with the history, review of systems, physical examination, assessments and plan.    Zainab Oliver MD  Professor and  Chair  Department of Dermatology  [facility]  ____________________________________________    CC: Hair Loss (Lupe is here today for hair loss. She states \" my hair is still gone\")    HPI:  Ms. Lupe Deluna is a 51 year old female who presents as a return patient for follow-up of hair loss, diagnosed as alopecia universalis.   - Last seen in-clinic on 11/29/2021  - Shedding or thinning, or both: yes  - Current tx: Synthroid  - If using Rogaine, 1 cannister lasts how long: n/a  - Scalp or hair care habits/products: she is using DHS Zinc shampoo 2x/week to wash her scalp. She also uses Biolage    She is interested in learning more about Xeljanz. She has not had any improvement in her alopecia. She continues to have no new hair growth since the last visit. She is not using any prescription or OTC medications for her alopecia, aside from synthroid.    She is still seeing a provider who manages her synthroid. Her labs have been normal, but she feels like it has helped her other symptoms of fatigue, cold intolerance, and weight gain.    No Any new medications, supplements, or products? (please list below)     No Scalp pain   No Scalp burning   Yes - after showering Scalp itching    No Eyebrow changes    No Eyelash changes   N/A Beard changes    No Other body hair changes    Yes - tenderness on top of nails Nail changes    N/A Additional symptoms? (please list below)     - Overall course: She is overall worsening. She has not had significant hair regrowth since Aug 2020. Patient notes that her nails seem to be improving.    Patient is otherwise feeling well, in usual state of health, and has no additional skin concerns today.     ROS: As per " HPI    Labs:  None reviewed.    Physical Exam:  Vitals: /86 (BP Location: Right arm)   Pulse 56   GEN: Well developed, well-nourished, in no acute distress, in a pleasant mood.    SKIN: Focused examination of the scalp and face was performed.  - Latif type: I  - Scattered fine fibers present in the eyebrow region.  - Eyelashes absent.  - Microblading done on eyebrows.   - Evidence of nail pitting distally with fewer nail changes in the proximal nail bed.  - No other lesions of concern on areas examined.       Medications:  Current Outpatient Medications   Medication     ciclopirox (PENLAC) 8 % external solution     cyclobenzaprine (FLEXERIL) 10 MG tablet     levothyroxine (SYNTHROID) 112 MCG tablet     liothyronine (CYTOMEL) 5 MCG tablet     progesterone (PROMETRIUM) 100 MG capsule     zolpidem (AMBIEN) 10 MG tablet     Estradiol-Estriol-Progesterone (BIEST/PROGESTERONE) CREA     levothyroxine (SYNTHROID/LEVOTHROID) 75 MCG tablet     SYNTHROID 100 MCG tablet     No current facility-administered medications for this visit.      Past Medical History:   Patient Active Problem List   Diagnosis     Fatigue     No past medical history on file.

## 2022-05-16 NOTE — LETTER
Date:June 20, 2022      Patient was self referred, no letter generated. Do not send.        [facility] Health Information

## 2022-05-16 NOTE — LETTER
5/16/2022       RE: Lupe Deluna  [address] [address]     Dear Colleague,    Thank you for referring your patient, Lupe Deluna, to the [facility] DERMATOLOGY CLINIC [place] at [facility]. Please see a copy of my visit note below.    [facility] Dermatology Note  Encounter Date: May 16, 2022  Office Visit     Dermatology Problem List:  1. 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 Scalp folliculitis - DHS Zinc  ____________________________________________    Assessment & Plan:     # Alopecia universalis, worsening  Punch bx 9/24/2020, c/w alopecia areata (in Media tab). Possible component of telogen effluvium.  Discussed CANDIDA inhibitors such as Xeljanz, including risks, benefits, and expectations of treatment. Patient is concerned about risk of cancer, we discussed clinical research being done currently. Reviewed the mechanism of autoimmune disorders and how this is present in alopecia areata. Plan to start safety labs today and continue to consider Xeljanz in the future, pending results.   - Currently being treated with synthroid by PCP, reports improvement in systemic symptoms despite normal TSH  - Labs ordered: lipids, hepatic panel, CBC, CMP, Hep B, Hep C, HIV, Quant gold, IgE  - If labs within normal limits, will send prescription for Xeljanz and wait for prior auth  - Not doing any topical therapies  - Patient did not have improvement in ILK injections, not interested at this time.  - Continue DHS Zinc shampoo for scalp maintenance.     Procedures Performed:   None    Follow-up: 4 month(s) in-person, or earlier for new or changing lesions    Staff and Resident:      Estefani Sahu MD  PGY-5  "Medicine-Dermatology  Pager [phone]      Scribe Disclosure:  I, Nadine Dunlap, am serving as a scribe to document services personally performed by Zainab Oliver MD based on data collection and the provider's statements to me.     Provider Disclosure:   The documentation recorded by the scribe accurately reflects the services I personally performed and the decisions made by me.    Patient was seen and examined with the medicine/dermatology resident. I agree with the history, review of systems, physical examination, assessments and plan.    Zainab Oliver MD  Professor and  Chair  Department of Dermatology  [facility]  ____________________________________________    CC: Hair Loss (Lupe is here today for hair loss. She states \" my hair is still gone\")    HPI:  Ms. Lupe Deluna is a 51 year old female who presents as a return patient for follow-up of hair loss, diagnosed as alopecia universalis.   - Last seen in-clinic on 11/29/2021  - Shedding or thinning, or both: yes  - Current tx: Synthroid  - If using Rogaine, 1 cannister lasts how long: n/a  - Scalp or hair care habits/products: she is using DHS Zinc shampoo 2x/week to wash her scalp. She also uses Biolage    She is interested in learning more about Xeljanz. She has not had any improvement in her alopecia. She continues to have no new hair growth since the last visit. She is not using any prescription or OTC medications for her alopecia, aside from synthroid.    She is still seeing a provider who manages her synthroid. Her labs have been normal, but she feels like it has helped her other symptoms of fatigue, cold intolerance, and weight gain.    No Any new medications, supplements, or products? (please list below)     No Scalp pain   No Scalp burning   Yes - after showering Scalp itching    No Eyebrow changes    No Eyelash changes   N/A Beard changes    No Other body hair changes    Yes - tenderness on top of nails Nail " changes    N/A Additional symptoms? (please list below)     - Overall course: She is overall worsening. She has not had significant hair regrowth since Aug 2020. Patient notes that her nails seem to be improving.    Patient is otherwise feeling well, in usual state of health, and has no additional skin concerns today.     ROS: As per HPI    Labs:  None reviewed.    Physical Exam:  Vitals: /86 (BP Location: Right arm)   Pulse 56   GEN: Well developed, well-nourished, in no acute distress, in a pleasant mood.    SKIN: Focused examination of the scalp and face was performed.  - Latif type: I  - Scattered fine fibers present in the eyebrow region.  - Eyelashes absent.  - Microblading done on eyebrows.   - Evidence of nail pitting distally with fewer nail changes in the proximal nail bed.  - No other lesions of concern on areas examined.       Medications:  Current Outpatient Medications   Medication     ciclopirox (PENLAC) 8 % external solution     cyclobenzaprine (FLEXERIL) 10 MG tablet     levothyroxine (SYNTHROID) 112 MCG tablet     liothyronine (CYTOMEL) 5 MCG tablet     progesterone (PROMETRIUM) 100 MG capsule     zolpidem (AMBIEN) 10 MG tablet     Estradiol-Estriol-Progesterone (BIEST/PROGESTERONE) CREA     levothyroxine (SYNTHROID/LEVOTHROID) 75 MCG tablet     SYNTHROID 100 MCG tablet     No current facility-administered medications for this visit.      Past Medical History:   Patient Active Problem List   Diagnosis     Fatigue     No past medical history on file.        Again, thank you for allowing me to participate in the care of your patient.      Sincerely,    Zainab Oliver MD

## 2022-05-16 NOTE — LETTER
Patient:  Lupe Deluna  :   1970  MRN:     [id number]        Ms.Teresa NICOLAS Saraviaaster  [address] [facility] [address]        May 17, 2022    Dear Lupe    Here are your labs.  Your thyroid is normal, your 3-month measure of your blood sugar (hemoglobin A1c) is improved.  Keep up the good work.     If you would like to be seeing endocrinology again, please call to schedule.  However, I do think you are doing great.    If you have any questions, please feel free to contact my nurse at [phone] select option #3 for triage nurse  or  option #1 for scheduling related questions.    Regards    Yolanda Schulte MD       Resulted Orders   TSH with free T4 reflex   Result Value Ref Range    TSH 0.80 0.40 - 4.00 mU/L   T3 total   Result Value Ref Range    T3 Total 117 60 - 181 ng/dL   Hemoglobin A1c   Result Value Ref Range    Hemoglobin A1C 5.7 (H) 0.0 - 5.6 %      Comment:      Normal <5.7%   Prediabetes 5.7-6.4%    Diabetes 6.5% or higher     Note: Adopted from ADA consensus guidelines.       Lab

## 2022-05-17 ENCOUNTER — TELEPHONE (OUTPATIENT)
Dept: ENDOCRINOLOGY | Facility: CLINIC | Age: 52
End: 2022-05-17

## 2022-05-17 LAB
GAMMA INTERFERON BACKGROUND BLD IA-ACNC: 0 IU/ML
IGE SERPL-ACNC: 2 KU/L (ref 0–114)
M TB IFN-G BLD-IMP: NEGATIVE
M TB IFN-G CD4+ BCKGRND COR BLD-ACNC: 10 IU/ML
MITOGEN IGNF BCKGRD COR BLD-ACNC: 0.01 IU/ML
MITOGEN IGNF BCKGRD COR BLD-ACNC: 0.02 IU/ML
QUANTIFERON MITOGEN: 10 IU/ML
QUANTIFERON NIL TUBE: 0 IU/ML
QUANTIFERON TB1 TUBE: 0.02 IU/ML
QUANTIFERON TB2 TUBE: 0.01

## 2022-05-17 NOTE — RESULT ENCOUNTER NOTE
Dear Lupe    Here are your labs.  Your thyroid is normal, your 3-month measure of your blood sugar (hemoglobin A1c) is improved.  Keep up the good work.     If you would like to be seeing endocrinology again, please call to schedule.  However, I do think you are doing great.    If you have any questions, please feel free to contact my nurse at [phone] select option #3 for triage nurse  or  option #1 for scheduling related questions.    Regards    Yolanda Schulte MD

## 2022-05-17 NOTE — TELEPHONE ENCOUNTER
Patient does not have follow-up.  However I will be happy to see again as needed by patient or her primary provider-letter below sent to patient.    -  Dear Lupe    Here are your labs.  Your thyroid is normal, your 3-month measure of your blood sugar (hemoglobin A1c) is improved.  Keep up the good work.     If you would like to be seeing endocrinology again, please call to schedule.  However, I do think you are doing great.    If you have any questions, please feel free to contact my nurse at [phone] select option #3 for triage nurse  or  option #1 for scheduling related questions.    Regards    Yolanda Schulte MD     Component      Latest Ref Rng & Units 5/16/2022   TSH      0.40 - 4.00 mU/L 0.80   Triiodothyronine (T3)      60 - 181 ng/dL 117   Hemoglobin A1C      0.0 - 5.6 % 5.7 (H)

## 2022-05-19 ENCOUNTER — TELEPHONE (OUTPATIENT)
Dept: DERMATOLOGY | Facility: CLINIC | Age: 52
End: 2022-05-19

## 2022-05-19 NOTE — RESULT ENCOUNTER NOTE
Labs are within normal limits, Quant Gold negative. Will send in tofacitinib prescription and await prior authorization.  FYI to Dr. Oliver and Zainab

## 2022-06-06 NOTE — TELEPHONE ENCOUNTER
PRIOR AUTHORIZATION DENIED    Medication: Xeljanz    Denial Date: 5/26/2022    Denial Rational: Not approved FDA diagnosis    Appeal Information: [phone]

## 2022-06-13 NOTE — TELEPHONE ENCOUNTER
please print out the denial and forward the paper copy to me while in clinic monday/tuesday or thursday  Received: Yesterday  Zainab Oliver MD [place], Kayrene, [facility]; P [facility]  Caller: Unspecified (3 weeks ago)  Hi,   As in the subject line -   Am pulling together a pile of these types of letters and will work on these next weekend.   Thanks!   MH

## 2022-06-23 NOTE — TELEPHONE ENCOUNTER
Medication Appeal Initiation    We have initiated an appeal for the requested medication:  Medication: Xeljanz  Appeal Start Date:  6/23/2022  Insurance Company: [employer] - Phone [phone] Fax [phone]  Comments:  Faxed [phone]

## 2022-07-11 NOTE — TELEPHONE ENCOUNTER
MEDICATION APPEAL DENIED    Medication: Xeljanz    Denial Date: 7/1/2022    Denial Rational: Not FDA Approved diagnosis    Second Level Appeal Information: External appeal available however patient must pay filing fee.      .

## 2022-07-19 NOTE — TELEPHONE ENCOUNTER
Received call from Lupe and helped her complete her form for the free drug application.  I also faxed in both the original denial and the appeal denial to [employer] at [phone]

## 2022-07-20 NOTE — TELEPHONE ENCOUNTER
Faxed in patient portion of the PAP application along with two of Lupe' check stubs to [facility] at [phone]

## 2022-07-26 DIAGNOSIS — L63.1 ALOPECIA AREATA UNIVERSALIS: ICD-10-CM

## 2022-07-26 NOTE — TELEPHONE ENCOUNTER
Free Drug Application Approved  Effective Dates: 7/25/22 to 12/31/22  Patient notified: yes   Additional Information:

## 2022-07-27 DIAGNOSIS — L63.1 ALOPECIA AREATA UNIVERSALIS: Primary | ICD-10-CM

## 2022-07-27 NOTE — TELEPHONE ENCOUNTER
[facility] Call Center    Phone Message    May a detailed message be left on voicemail: yes     Reason for Call: Medication Refill Request    Has the patient contacted the pharmacy for the refill? Yes   Name of medication being requested: tofacitinib (XELJANZ) 5 MG tablet  Provider who prescribed the medication: Dr. Oliver   Pharmacy: [facility] [place] [address] [address] (Pharmacy)  Date medication is needed: ASAP   Thanks       Action Taken: Message routed to:  [facility] Derm    Travel Screening: Not Applicable

## 2022-07-27 NOTE — TELEPHONE ENCOUNTER
tofacitinib (XELJANZ) 5 MG tablet  Last Written Prescription Date:   5/18/2022  Last Fill Quantity: 60,   # refills: 2  Last Office Visit :  5/16/2022  Future Office visit:   1/16/2023    Routing refill request to provider for review/approval because:  Drug not on the [facility], P or [facility] refill protocol or controlled substance      Maritza Zafar RN  Central Triage Red Flags/Med Refills

## 2022-07-27 NOTE — TELEPHONE ENCOUNTER
Received refill request for tofacitinib as the resident on call. Reviewed last dermatology clinic note and Mychart correspondence. Patient just got tofa approved through [employer] and has not started it yet. Limited refill provided to bridge patient to next appt.     The patient's information will be forwarded to the scheduling pool for return visit as planned at prior visit. Per Dr. Oliver, this patient needs to be seen in November 2022.     Writer will send message to patient to remind her to get blood work 1 and 4 months after starting.     Routed to attending provider as FYESSIE.     Kelly Botello MD  Dermatology Resident

## 2022-08-03 ENCOUNTER — TELEPHONE (OUTPATIENT)
Dept: DERMATOLOGY | Facility: CLINIC | Age: 52
End: 2022-08-03

## 2022-08-03 DIAGNOSIS — L63.1 ALOPECIA AREATA UNIVERSALIS: ICD-10-CM

## 2022-08-03 NOTE — TELEPHONE ENCOUNTER
[facility] Call Center    Phone Message    May a detailed message be left on voicemail: yes     Reason for Call: Medication Question or concern regarding medication   Prescription Clarification  Name of Medication: tofacitinib (XELJANZ) 5 MG tablet  Prescribing Provider: Dr. Oliver   Pharmacy: [facility], P: [phone], F: [phone]   What on the order needs clarification? tofacitinib (XELJANZ) 5 MG tablet needs to be sent to the [facility] instead of [facility] due to insurance.           Action Taken: Message routed to:  [facility] Derm    Travel Screening: Not Applicable

## 2022-08-04 NOTE — TELEPHONE ENCOUNTER
Sent to [facility] pharmacy    Kelly Botello MD  Dermatology Resident, PGY4

## 2022-08-26 ENCOUNTER — LAB (OUTPATIENT)
Dept: LAB | Facility: CLINIC | Age: 52
End: 2022-08-26
Payer: COMMERCIAL

## 2022-08-26 DIAGNOSIS — L63.1 ALOPECIA AREATA UNIVERSALIS: ICD-10-CM

## 2022-08-26 LAB
ALBUMIN SERPL-MCNC: 4.1 G/DL (ref 3.4–5)
ALP SERPL-CCNC: 89 U/L (ref 40–150)
ALT SERPL W P-5'-P-CCNC: 23 U/L (ref 0–50)
ANION GAP SERPL CALCULATED.3IONS-SCNC: 7 MMOL/L (ref 3–14)
AST SERPL W P-5'-P-CCNC: 16 U/L (ref 0–45)
BASOPHILS # BLD AUTO: 0 10E3/UL (ref 0–0.2)
BASOPHILS NFR BLD AUTO: 0 %
BILIRUB SERPL-MCNC: 0.8 MG/DL (ref 0.2–1.3)
BUN SERPL-MCNC: 22 MG/DL (ref 7–30)
CALCIUM SERPL-MCNC: 9.3 MG/DL (ref 8.5–10.1)
CHLORIDE BLD-SCNC: 105 MMOL/L (ref 94–109)
CHOLEST SERPL-MCNC: 187 MG/DL
CO2 SERPL-SCNC: 26 MMOL/L (ref 20–32)
CREAT SERPL-MCNC: 0.9 MG/DL (ref 0.52–1.04)
EOSINOPHIL # BLD AUTO: 0.1 10E3/UL (ref 0–0.7)
EOSINOPHIL NFR BLD AUTO: 1 %
ERYTHROCYTE [DISTWIDTH] IN BLOOD BY AUTOMATED COUNT: 14 % (ref 10–15)
FASTING STATUS PATIENT QL REPORTED: NO
GFR SERPL CREATININE-BSD FRML MDRD: 77 ML/MIN/1.73M2
GLUCOSE BLD-MCNC: 114 MG/DL (ref 70–99)
HCT VFR BLD AUTO: 40.2 % (ref 35–47)
HDLC SERPL-MCNC: 68 MG/DL
HGB BLD-MCNC: 12.9 G/DL (ref 11.7–15.7)
LDLC SERPL CALC-MCNC: 68 MG/DL
LYMPHOCYTES # BLD AUTO: 3.5 10E3/UL (ref 0.8–5.3)
LYMPHOCYTES NFR BLD AUTO: 36 %
MCH RBC QN AUTO: 27 PG (ref 26.5–33)
MCHC RBC AUTO-ENTMCNC: 32.1 G/DL (ref 31.5–36.5)
MCV RBC AUTO: 84 FL (ref 78–100)
MONOCYTES # BLD AUTO: 0.7 10E3/UL (ref 0–1.3)
MONOCYTES NFR BLD AUTO: 7 %
NEUTROPHILS # BLD AUTO: 5.5 10E3/UL (ref 1.6–8.3)
NEUTROPHILS NFR BLD AUTO: 56 %
NONHDLC SERPL-MCNC: 119 MG/DL
PLATELET # BLD AUTO: 316 10E3/UL (ref 150–450)
POTASSIUM BLD-SCNC: 4.2 MMOL/L (ref 3.4–5.3)
PROT SERPL-MCNC: 6.8 G/DL (ref 6.8–8.8)
RBC # BLD AUTO: 4.78 10E6/UL (ref 3.8–5.2)
SODIUM SERPL-SCNC: 138 MMOL/L (ref 133–144)
TRIGL SERPL-MCNC: 254 MG/DL
WBC # BLD AUTO: 9.9 10E3/UL (ref 4–11)

## 2022-08-26 PROCEDURE — 80061 LIPID PANEL: CPT

## 2022-08-26 PROCEDURE — 80053 COMPREHEN METABOLIC PANEL: CPT

## 2022-08-26 PROCEDURE — 85025 COMPLETE CBC W/AUTO DIFF WBC: CPT

## 2022-08-26 PROCEDURE — 36415 COLL VENOUS BLD VENIPUNCTURE: CPT

## 2022-10-10 ENCOUNTER — HEALTH MAINTENANCE LETTER (OUTPATIENT)
Age: 52
End: 2022-10-10

## 2022-11-08 ENCOUNTER — TELEPHONE (OUTPATIENT)
Dept: DERMATOLOGY | Facility: CLINIC | Age: 52
End: 2022-11-08

## 2022-11-14 ENCOUNTER — TELEPHONE (OUTPATIENT)
Dept: DERMATOLOGY | Facility: CLINIC | Age: 52
End: 2022-11-14

## 2022-11-14 ENCOUNTER — OFFICE VISIT (OUTPATIENT)
Dept: DERMATOLOGY | Facility: CLINIC | Age: 52
End: 2022-11-14
Payer: COMMERCIAL

## 2022-11-14 ENCOUNTER — LAB (OUTPATIENT)
Dept: LAB | Facility: CLINIC | Age: 52
End: 2022-11-14
Payer: COMMERCIAL

## 2022-11-14 VITALS — HEART RATE: 68 BPM | SYSTOLIC BLOOD PRESSURE: 140 MMHG | DIASTOLIC BLOOD PRESSURE: 66 MMHG

## 2022-11-14 DIAGNOSIS — L63.1 ALOPECIA AREATA UNIVERSALIS: ICD-10-CM

## 2022-11-14 DIAGNOSIS — M35.9 AUTOIMMUNE DISEASE (H): ICD-10-CM

## 2022-11-14 DIAGNOSIS — L63.9 ALOPECIA AREATA: ICD-10-CM

## 2022-11-14 DIAGNOSIS — Z51.81 MEDICATION MONITORING ENCOUNTER: Primary | ICD-10-CM

## 2022-11-14 DIAGNOSIS — Z51.81 MEDICATION MONITORING ENCOUNTER: ICD-10-CM

## 2022-11-14 LAB
ALBUMIN SERPL BCG-MCNC: 4.7 G/DL (ref 3.5–5.2)
ALBUMIN UR-MCNC: NEGATIVE MG/DL
ALP SERPL-CCNC: 81 U/L (ref 35–104)
ALT SERPL W P-5'-P-CCNC: 16 U/L (ref 10–35)
ANION GAP SERPL CALCULATED.3IONS-SCNC: 10 MMOL/L (ref 7–15)
APPEARANCE UR: CLEAR
AST SERPL W P-5'-P-CCNC: 20 U/L (ref 10–35)
BASOPHILS # BLD AUTO: 0 10E3/UL (ref 0–0.2)
BASOPHILS NFR BLD AUTO: 1 %
BILIRUB SERPL-MCNC: 0.6 MG/DL
BILIRUB UR QL STRIP: NEGATIVE
BUN SERPL-MCNC: 10.9 MG/DL (ref 6–20)
CALCIUM SERPL-MCNC: 10.2 MG/DL (ref 8.6–10)
CHLORIDE SERPL-SCNC: 105 MMOL/L (ref 98–107)
CHOLEST SERPL-MCNC: 190 MG/DL
COLOR UR AUTO: NORMAL
CREAT SERPL-MCNC: 0.61 MG/DL (ref 0.51–0.95)
DEPRECATED HCO3 PLAS-SCNC: 26 MMOL/L (ref 22–29)
EOSINOPHIL # BLD AUTO: 0.1 10E3/UL (ref 0–0.7)
EOSINOPHIL NFR BLD AUTO: 1 %
ERYTHROCYTE [DISTWIDTH] IN BLOOD BY AUTOMATED COUNT: 13.4 % (ref 10–15)
GFR SERPL CREATININE-BSD FRML MDRD: >90 ML/MIN/1.73M2
GLUCOSE SERPL-MCNC: 107 MG/DL (ref 70–99)
GLUCOSE UR STRIP-MCNC: NEGATIVE MG/DL
HCT VFR BLD AUTO: 42.5 % (ref 35–47)
HDLC SERPL-MCNC: 76 MG/DL
HGB BLD-MCNC: 13.7 G/DL (ref 11.7–15.7)
HGB UR QL STRIP: NEGATIVE
IMM GRANULOCYTES # BLD: 0 10E3/UL
IMM GRANULOCYTES NFR BLD: 0 %
KETONES UR STRIP-MCNC: NEGATIVE MG/DL
LDLC SERPL CALC-MCNC: 85 MG/DL
LEUKOCYTE ESTERASE UR QL STRIP: NEGATIVE
LYMPHOCYTES # BLD AUTO: 2.8 10E3/UL (ref 0.8–5.3)
LYMPHOCYTES NFR BLD AUTO: 34 %
MCH RBC QN AUTO: 26.4 PG (ref 26.5–33)
MCHC RBC AUTO-ENTMCNC: 32.2 G/DL (ref 31.5–36.5)
MCV RBC AUTO: 82 FL (ref 78–100)
MONOCYTES # BLD AUTO: 0.6 10E3/UL (ref 0–1.3)
MONOCYTES NFR BLD AUTO: 8 %
NEUTROPHILS # BLD AUTO: 4.7 10E3/UL (ref 1.6–8.3)
NEUTROPHILS NFR BLD AUTO: 56 %
NITRATE UR QL: NEGATIVE
NONHDLC SERPL-MCNC: 114 MG/DL
NRBC # BLD AUTO: 0 10E3/UL
NRBC BLD AUTO-RTO: 0 /100
PH UR STRIP: 6 [PH] (ref 5–7)
PLATELET # BLD AUTO: 335 10E3/UL (ref 150–450)
POTASSIUM SERPL-SCNC: 4.6 MMOL/L (ref 3.4–5.3)
PROT SERPL-MCNC: 6.9 G/DL (ref 6.4–8.3)
RBC # BLD AUTO: 5.18 10E6/UL (ref 3.8–5.2)
RBC URINE: <1 /HPF
SODIUM SERPL-SCNC: 141 MMOL/L (ref 136–145)
SP GR UR STRIP: 1 (ref 1–1.03)
SQUAMOUS EPITHELIAL: <1 /HPF
TRIGL SERPL-MCNC: 144 MG/DL
UROBILINOGEN UR STRIP-MCNC: NORMAL MG/DL
WBC # BLD AUTO: 8.3 10E3/UL (ref 4–11)
WBC URINE: 0 /HPF

## 2022-11-14 PROCEDURE — 99214 OFFICE O/P EST MOD 30 MIN: CPT | Performed by: DERMATOLOGY

## 2022-11-14 PROCEDURE — 36415 COLL VENOUS BLD VENIPUNCTURE: CPT | Performed by: PATHOLOGY

## 2022-11-14 PROCEDURE — 81001 URINALYSIS AUTO W/SCOPE: CPT | Performed by: PATHOLOGY

## 2022-11-14 PROCEDURE — 80053 COMPREHEN METABOLIC PANEL: CPT | Performed by: PATHOLOGY

## 2022-11-14 PROCEDURE — 85025 COMPLETE CBC W/AUTO DIFF WBC: CPT | Performed by: PATHOLOGY

## 2022-11-14 PROCEDURE — 80061 LIPID PANEL: CPT | Performed by: PATHOLOGY

## 2022-11-14 ASSESSMENT — PAIN SCALES - GENERAL: PAINLEVEL: NO PAIN (0)

## 2022-11-14 NOTE — NURSING NOTE
Dermatology Rooming Note    Lupe Deluna's goals for this visit include:   Chief Complaint   Patient presents with     Hair Loss     HL Follow-up  Started Xeljanz in July: noticing some growth     Deja Farrar LPN

## 2022-11-14 NOTE — TELEPHONE ENCOUNTER
PA Initiation    Medication: Olumiant  Insurance Company: [employer] - Phone [phone] Fax [phone]  Pharmacy Filling the Rx:    Filling Pharmacy Phone:    Filling Pharmacy Fax:    Start Date: 11/14/2022    Key: [id number]    PA Pending questions waiting for visit to be signed

## 2022-11-14 NOTE — LETTER
Date:November 28, 2022      Patient was self referred, no letter generated. Do not send.        [facility] Health Information

## 2022-11-14 NOTE — PROGRESS NOTES
[facility] Dermatology Note  Encounter Date: Nov 14, 2022  Office Visit     Dermatology Problem List:  1. 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 Aim for switch to baricitinib if insurance supports  2. Scalp folliculitis - DHS Zinc  ____________________________________________    Assessment & Plan:     # Alopecia universalis, improving  Punch bx 9/24/2020, c/w alopecia areata. Possible component of telogen effluvium.  Patient is seeing significant hair growth on the scalp, face, arms, and pubic area since starting Xeljanz on July 27th.   - Xeljanz 5 mg 2x/day  - Synthroid by PCP, seeing today.  - Safety labs ordered today.  - Will revisit discussion of low dose minoxidil with normalization of TSH.  - Continue DHS Zinc shampoo for scalp maintenance.   - Consider CANDIDA switch   - from off label tofacitinib to FDA approved baricitinib    Procedures Performed:   None    Follow-up: 4 month(s) in-person, or earlier for new or changing lesions    Staff and Scribe:       Enma De Souza  Medical Student    Staff Physician:  I was present with the medical student who participated in the service and in the documentation of the note. I have verified the history and personally performed the physical exam and medical decision making. I agree with the assessment and plan of care as documented in the note.       Zainab Oliver MD  Professor and Chair  Department of Dermatology  [facility]: Phone: [phone], Fax:[phone]  [facility]: Phone: [phone], Fax: [phone]        ____________________________________________    CC: Follow-up hair loss    HPI:  Ms. Lupe Deluna is a 52 year old female who presents as a return patient for follow-up  of hair loss, diagnosed as alopecia universalis.   - Last seen in-clinic on 5/16/2022  - Shedding or thinning, or both: none  - Current tx: Xeljanz 5 mg 2x/day, synthroid, DHS zinc shampoo  - Past tx: ILK injections  - Scalp or hair care habits/products: biolage and zinc shampoo every other day  no Any new medications, supplements, or products? (please list below)     no Scalp pain   no Scalp burning   no Scalp itching    yes Eyebrow changes    yes Eyelash changes   n/a Beard changes    Yes Other body hair changes    no Nail changes    no Additional symptoms? (please list below)     - Overall course: Patient is noticing patchy hair regrowth on the scalp along with hair on the eyebrows, eyelashes, arms, pubic hair, and some on the leg. Tenderness on spots of hair growth with touching otherwise no itching or burning. Recent labs showed a ferritin of 448 and TSH of 0.01. She is currently on 112 mcg daily of Synthroid. Seeing PCP today to discuss labs. Is involved with a support group online who discussed effectiveness of oral minoxidil and would like to discuss today.    Patient is otherwise feeling well, in usual state of health, and has no additional skin concerns today.     Labs:  CBC , CMP , Iron studies , TSH, Vitamin D and Hemolysis labs (LDH, reticulocyte count) reviewed.    Physical Exam:  Vitals: BP: 140/66, 68 bpm  GEN: Well developed, well-nourished, in no acute distress, in a pleasant mood.    SKIN: Focused examination of scalp, face  and nails was performed.  - no diffuse erythema   - no perifollicular erythema  - no perifollicular scale   - no scaling of the scalp   - sparse lash growth on lower lid  - robust eyebrow growth  - no no nail pitting or dystrophy.   - no scalp folliculitis/pustules   - In comparison to prior photographs, improved hair regrowth of scalp, face, arms.  - SALT score: 25%+10%+15%+15%: 65%  - No other lesions of concern on areas examined.                                    Medications:  Current Outpatient Medications   Medication     ciclopirox (PENLAC) 8 % external solution     cyclobenzaprine (FLEXERIL) 10 MG tablet     Estradiol-Estriol-Progesterone (BIEST/PROGESTERONE) CREA     levothyroxine (SYNTHROID) 112 MCG tablet     levothyroxine (SYNTHROID/LEVOTHROID) 75 MCG tablet     liothyronine (CYTOMEL) 5 MCG tablet     progesterone (PROMETRIUM) 100 MG capsule     SYNTHROID 100 MCG tablet     tofacitinib (XELJANZ) 5 MG tablet     zolpidem (AMBIEN) 10 MG tablet     No current facility-administered medications for this visit.      Past Medical History:   Patient Active Problem List   Diagnosis     Fatigue     No past medical history on file.    CC No referring provider defined for this encounter. on close of this encounter.

## 2022-11-14 NOTE — LETTER
11/14/2022       RE: Lupe Deluna  [address] [address]     Dear Colleague,    Thank you for referring your patient, Lupe Deluna, to the [facility] [place] at [facility]. Please see a copy of my visit note below.    [facility] Dermatology Note  Encounter Date: Nov 14, 2022  Office Visit     Dermatology Problem List:  1. 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 Aim for switch to baricitinib if insurance supports  2. Scalp folliculitis - DHS Zinc  ____________________________________________    Assessment & Plan:     # Alopecia universalis, improving  Punch bx 9/24/2020, c/w alopecia areata. Possible component of telogen effluvium.  Patient is seeing significant hair growth on the scalp, face, arms, and pubic area since starting Xeljanz on July 27th.   - Xeljanz 5 mg 2x/day  - Synthroid by PCP, seeing today.  - Safety labs ordered today.  - Will revisit discussion of low dose minoxidil with normalization of TSH.  - Continue DHS Zinc shampoo for scalp maintenance.   - Consider CANDIDA switch   - from off label tofacitinib to FDA approved baricitinib    Procedures Performed:   None    Follow-up: 4 month(s) in-person, or earlier for new or changing lesions    Staff and Scribe:       Enma De Souza  Medical Student    Staff Physician:  I was present with the medical student who participated in the service and in the documentation of the note. I have verified the history and personally performed the physical exam and medical decision making. I agree with the assessment and plan of care as documented in the note.       Zainab Oliver MD  Professor and Chair  Department of Dermatology  [facility]  [facility]: Phone: [phone], Fax:[phone]  [facility]: Phone: [phone], Fax: [phone]        ____________________________________________    CC: Follow-up hair loss    HPI:  Ms. Lupe Deluna is a 52 year old female who presents as a return patient for follow-up of hair loss, diagnosed as alopecia universalis.   - Last seen in-clinic on 5/16/2022  - Shedding or thinning, or both: none  - Current tx: Xeljanz 5 mg 2x/day, synthroid, DHS zinc shampoo  - Past tx: ILK injections  - Scalp or hair care habits/products: biolage and zinc shampoo every other day  no Any new medications, supplements, or products? (please list below)     no Scalp pain   no Scalp burning   no Scalp itching    yes Eyebrow changes    yes Eyelash changes   n/a Beard changes    Yes Other body hair changes    no Nail changes    no Additional symptoms? (please list below)     - Overall course: Patient is noticing patchy hair regrowth on the scalp along with hair on the eyebrows, eyelashes, arms, pubic hair, and some on the leg. Tenderness on spots of hair growth with touching otherwise no itching or burning. Recent labs showed a ferritin of 448 and TSH of 0.01. She is currently on 112 mcg daily of Synthroid. Seeing PCP today to discuss labs. Is involved with a support group online who discussed effectiveness of oral minoxidil and would like to discuss today.    Patient is otherwise feeling well, in usual state of health, and has no additional skin concerns today.     Labs:  CBC , CMP , Iron studies , TSH, Vitamin D and Hemolysis labs (LDH, reticulocyte count) reviewed.    Physical Exam:  Vitals: BP: 140/66, 68 bpm  GEN: Well developed, well-nourished, in no acute distress, in a pleasant mood.    SKIN: Focused examination of scalp, face  and nails was performed.  - no diffuse erythema   - no perifollicular erythema  - no perifollicular scale   - no scaling of the scalp   - sparse  lash growth on lower lid  - robust eyebrow growth  - no no nail pitting or dystrophy.   - no scalp folliculitis/pustules   - In comparison to prior photographs, improved hair regrowth of scalp, face, arms.  - SALT score: 25%+10%+15%+15%: 65%  - No other lesions of concern on areas examined.                                   Medications:  Current Outpatient Medications   Medication     ciclopirox (PENLAC) 8 % external solution     cyclobenzaprine (FLEXERIL) 10 MG tablet     Estradiol-Estriol-Progesterone (BIEST/PROGESTERONE) CREA     levothyroxine (SYNTHROID) 112 MCG tablet     levothyroxine (SYNTHROID/LEVOTHROID) 75 MCG tablet     liothyronine (CYTOMEL) 5 MCG tablet     progesterone (PROMETRIUM) 100 MG capsule     SYNTHROID 100 MCG tablet     tofacitinib (XELJANZ) 5 MG tablet     zolpidem (AMBIEN) 10 MG tablet     No current facility-administered medications for this visit.      Past Medical History:   Patient Active Problem List   Diagnosis     Fatigue     No past medical history on file.    CC No referring provider defined for this encounter. on close of this encounter.      Again, thank you for allowing me to participate in the care of your patient.      Sincerely,    Zainab Oliver MD

## 2022-11-18 NOTE — TELEPHONE ENCOUNTER
Prior Authorization Approval    Authorization Effective Date: 10/17/2022  Authorization Expiration Date: 11/17/2024  Medication: Olumiant  Approved Dose/Quantity: 30 per 30 days  Reference #: Key: [id number]   Insurance Company: [employer] - Phone [phone] Fax [phone]  Expected CoPay: $923.00     CoPay Card Available: Yes    Foundation Assistance Needed:    Which Pharmacy is filling the prescription (Not needed for infusion/clinic administered): [facility] - [place], [place] - [address]  Pharmacy Notified: Yes  Patient Notified: Yes

## 2022-11-18 NOTE — TELEPHONE ENCOUNTER
Akiko Hairston  [facility] 1 hour ago (8:34 AM)     KS  Please advise, patient is now approved for olumiant. Are we still wanting to renew for Xeljanz?      Yuan Ramirez Kayla 4 days ago     MF  Signed form scanned to your email. Apologies, she is only here mondays and some fridays so it's difficult to get signatures quickly.     Yuan Ramirez, Akiko Raymundo Marc 7 days ago     KS  Good morning, I just wanted to follow to make sure you received my email with pap. Thank you akiko

## 2022-11-28 NOTE — TELEPHONE ENCOUNTER
Zainab Oliver MD  You; Darcie Duckworth, RN 3 days ago       Hi,   Yes, it would be best to discontinue xelganz once Lupe has Olumiant in her possession.   Thanks for checking,   MH

## 2022-12-09 NOTE — TELEPHONE ENCOUNTER
Spoke with Dr. Oliver regarding patient request to renew Xeljanz PAP. Dr. Oliver states that it is okay for her to renew Xeljanz PAP just in case she discontinues Olumiant.     Yuan Ramirez, EMT

## 2022-12-12 NOTE — TELEPHONE ENCOUNTER
Free Drug Application Initiated  Medication: Xeljanz  Sponsor: [employer]  Phone #: [phone]  Fax #: [phone]  Additional Information: faxed provider portion of PAP

## 2022-12-14 NOTE — TELEPHONE ENCOUNTER
[employer] states all documents are now on file and application is pending. No eta outcome. Will check weekly.

## 2022-12-15 NOTE — TELEPHONE ENCOUNTER
Free Drug Application Approved  Effective Dates: 1/15/22 to 12/31/23  Patient notified: yes [employer] mailed letter  Additional Information:

## 2022-12-21 NOTE — TELEPHONE ENCOUNTER
Patient informed of approval through [employer] see 11/28/2022 mychart encounter for more information.    Yuan Ramirez, EMT

## 2023-01-17 ENCOUNTER — OFFICE VISIT (OUTPATIENT)
Dept: DERMATOLOGY | Facility: CLINIC | Age: 53
End: 2023-01-17
Payer: COMMERCIAL

## 2023-01-17 VITALS — DIASTOLIC BLOOD PRESSURE: 85 MMHG | SYSTOLIC BLOOD PRESSURE: 133 MMHG | HEART RATE: 64 BPM

## 2023-01-17 DIAGNOSIS — L65.9 LOSS OF HAIR: ICD-10-CM

## 2023-01-17 DIAGNOSIS — L73.9 FOLLICULITIS: ICD-10-CM

## 2023-01-17 DIAGNOSIS — Z51.81 MEDICATION MONITORING ENCOUNTER: Primary | ICD-10-CM

## 2023-01-17 DIAGNOSIS — L63.1 ALOPECIA AREATA UNIVERSALIS: ICD-10-CM

## 2023-01-17 PROCEDURE — 99214 OFFICE O/P EST MOD 30 MIN: CPT | Mod: GC | Performed by: DERMATOLOGY

## 2023-01-17 RX ORDER — KETOCONAZOLE 20 MG/ML
SHAMPOO TOPICAL DAILY PRN
Qty: 120 ML | Refills: 11 | Status: SHIPPED | OUTPATIENT
Start: 2023-01-17

## 2023-01-17 RX ORDER — LEVOTHYROXINE SODIUM 100 UG/1
100 TABLET ORAL
COMMUNITY
End: 2023-08-07

## 2023-01-17 RX ORDER — CLINDAMYCIN PHOSPHATE 11.9 MG/ML
SOLUTION TOPICAL 2 TIMES DAILY PRN
Qty: 60 ML | Refills: 11 | Status: SHIPPED | OUTPATIENT
Start: 2023-01-17

## 2023-01-17 RX ORDER — BIMATOPROST 3 UG/ML
1 SOLUTION TOPICAL AT BEDTIME
Qty: 5 ML | Refills: 11 | Status: SHIPPED | OUTPATIENT
Start: 2023-01-17

## 2023-01-17 ASSESSMENT — PAIN SCALES - GENERAL: PAINLEVEL: NO PAIN (0)

## 2023-01-17 NOTE — PATIENT INSTRUCTIONS
Low dose oral minoxidil 1.25mg daily if your PCP approves this to promote hair growth    - ketoconazole shampoo every other day, can alternate with zinc shampoo  - BPO wash and clindamycin solution for scalp bumps  - continue baricitinib

## 2023-01-17 NOTE — PROGRESS NOTES
[facility] Dermatology Note  Encounter Date: Jan 17, 2023  Office Visit     Dermatology Problem List:  1. 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 Scalp folliculitis - ketoconazole, BPO, clindamycin  ____________________________________________    Assessment & Plan:   # Alopecia universalis, improving  Punch bx 9/24/2020, c/w alopecia areata. Possible component of telogen effluvium. Had mild flaring with transition from baricitinib - will continue for now for total of 6 months to see if notices benefit.   - Continue baricitinib 4mg daily  - Safety labs at next follow up, recent CBC and CMP 12/2022 with PCP reviewed  - Consider low dose minoxidil with normalization of TSH, <0.01 in 12/22/22, or with PCP approval given hx of thyroid dz  - Continue DHS Zinc shampoo for scalp maintenance in addition with ketoconazole     2.13.23 Message read that primary care approved use of low dose oral minoxidil. Script written with instructions to take 1.25 mg daily.    # Scalp folliculitis  Likely SE from baricitinib.   - Start BPO wash and clindamycin   - Start ketoconazole shampoo     Procedures Performed:   None    Follow-up: 4 month(s) in-person, or earlier for new or changing lesions    Staff and Resident:     Lio Taylor MD  PGY-3 Dermatology  Pager: [phone]      Patient was seen and examined with the dermatology resident. I agree with the history, review of systems, physical examination, assessments and plan.    Zainab Oliver MD  Professor and  Chair  Department of Dermatology  [facility]        ____________________________________________    CC: Follow-up hair loss      HPI:  Ms. Lupe Deluna is a 52 year old female who presents as a return patient for follow-up of hair loss, diagnosed as alopecia universalis.   - Last seen in-clinic on 11/2022  -  Shedding or thinning, or both: shedding since transition  - Current tx: baricitinib   - Past tx: ILK injections  - Scalp or hair care habits/products: biolage and zinc shampoo every other day  no Any new medications, supplements, or products? (please list below)     no Scalp pain   no Scalp burning   no Scalp itching    yes Eyebrow changes    yes Eyelash changes   n/a Beard changes    Yes Other body hair changes    no Nail changes    Yes asbelow Additional symptoms? (please list below)     - Overall course: worse with transition from xeljanz to baricitinib. Nausea some in the mornings now resolved. Tingling in legs now resolved. better with Mg in the nighttime  Bumps on occiput. Has lost approximately half the hair while she was on xeljanz. Wonders if she can do oral minoxidil. Wonders about Latisse. Neem supplement? Patient is otherwise feeling well, in usual state of health, and has no additional skin concerns today.     Labs:  CBC , CMP , Iron studies , TSH, Vitamin D and Hemolysis labs (LDH, reticulocyte count) reviewed.    Physical Exam:  Vitals: BP: 140/66, 68 bpm  GEN: Well developed, well-nourished, in no acute distress, in a pleasant mood.    SKIN: Focused examination of scalp, face  and nails was performed.  - no diffuse erythema   - no perifollicular erythema  - no perifollicular scale   - no scaling of the scalp   - sparse lash growth on lower lid  - decreased eyebrow growth compared to prior  - no no nail pitting or dystrophy.   - no scalp folliculitis/pustules   - In comparison to prior photographs, more alopecic patches but with fine vellus hairs throughout  - No other lesions of concern on areas examined.                                         Medications:  Current Outpatient Medications   Medication     Baricitinib 4 MG TABS     Estradiol-Estriol-Progesterone (BIEST/PROGESTERONE) CREA     levothyroxine (SYNTHROID/LEVOTHROID) 100 MCG tablet     liothyronine (CYTOMEL) 5 MCG tablet     progesterone  (PROMETRIUM) 100 MG capsule     zolpidem (AMBIEN) 10 MG tablet     cyclobenzaprine (FLEXERIL) 10 MG tablet     tofacitinib (XELJANZ) 5 MG tablet     No current facility-administered medications for this visit.      Past Medical History:   Patient Active Problem List   Diagnosis     Fatigue     No past medical history on file.    CC No referring provider defined for this encounter. on close of this encounter.

## 2023-01-17 NOTE — NURSING NOTE
Dermatology Rooming Note    Lupe Deluna's goals for this visit include:   Chief Complaint   Patient presents with     Hair/Scalp Problem     Has lost hair since switching to Olumiant, bumps on scalp     Deja Farrar LPN

## 2023-01-17 NOTE — LETTER
1/17/2023       RE: Lupe Deluna  [address] [address]     Dear Colleague,    Thank you for referring your patient, Lupe Deluna, to the [facility] DERMATOLOGY CLINIC [place] at [facility]. Please see a copy of my visit note below.    [facility] Dermatology Note  Encounter Date: Jan 17, 2023  Office Visit     Dermatology Problem List:  1. 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 Scalp folliculitis - ketoconazole, BPO, clindamycin  ____________________________________________    Assessment & Plan:   # Alopecia universalis, improving  Punch bx 9/24/2020, c/w alopecia areata. Possible component of telogen effluvium. Had mild flaring with transition from baricitinib - will continue for now for total of 6 months to see if notices benefit.   - Continue baricitinib 4mg daily  - Safety labs at next follow up, recent CBC and CMP 12/2022 with PCP reviewed  - Consider low dose minoxidil with normalization of TSH, <0.01 in 12/22/22, or with PCP approval given hx of thyroid dz  - Continue DHS Zinc shampoo for scalp maintenance in addition with ketoconazole     2.13.23 Message read that primary care approved use of low dose oral minoxidil. Script written with instructions to take 1.25 mg daily.    # Scalp folliculitis  Likely SE from baricitinib.   - Start BPO wash and clindamycin   - Start ketoconazole shampoo     Procedures Performed:   None    Follow-up: 4 month(s) in-person, or earlier for new or changing lesions    Staff and Resident:     Lio Taylor MD  PGY-3 Dermatology  Pager: [phone]      Patient was seen and examined with the dermatology resident. I agree with the history, review of systems, physical examination, assessments and plan.    Zainab Oliver MD  Professor and   Chair  Department of Dermatology  [facility]        ____________________________________________    CC: Follow-up hair loss      HPI:  Ms. Lupe Deluna is a 52 year old female who presents as a return patient for follow-up of hair loss, diagnosed as alopecia universalis.   - Last seen in-clinic on 11/2022  - Shedding or thinning, or both: shedding since transition  - Current tx: baricitinib   - Past tx: ILK injections  - Scalp or hair care habits/products: biolage and zinc shampoo every other day  no Any new medications, supplements, or products? (please list below)     no Scalp pain   no Scalp burning   no Scalp itching    yes Eyebrow changes    yes Eyelash changes   n/a Beard changes    Yes Other body hair changes    no Nail changes    Yes asbelow Additional symptoms? (please list below)     - Overall course: worse with transition from xeljanz to baricitinib. Nausea some in the mornings now resolved. Tingling in legs now resolved. better with Mg in the nighttime  Bumps on occiput. Has lost approximately half the hair while she was on xeljanz. Wonders if she can do oral minoxidil. Wonders about Latisse. Neem supplement? Patient is otherwise feeling well, in usual state of health, and has no additional skin concerns today.     Labs:  CBC , CMP , Iron studies , TSH, Vitamin D and Hemolysis labs (LDH, reticulocyte count) reviewed.    Physical Exam:  Vitals: BP: 140/66, 68 bpm  GEN: Well developed, well-nourished, in no acute distress, in a pleasant mood.    SKIN: Focused examination of scalp, face  and nails was performed.  - no diffuse erythema   - no perifollicular erythema  - no perifollicular scale   - no scaling of the scalp   - sparse lash growth on lower lid  - decreased eyebrow growth compared to prior  - no no nail pitting or dystrophy.   - no scalp folliculitis/pustules   - In comparison to prior photographs, more alopecic patches but with fine vellus hairs throughout  - No other lesions of  concern on areas examined.                                         Medications:  Current Outpatient Medications   Medication     Baricitinib 4 MG TABS     Estradiol-Estriol-Progesterone (BIEST/PROGESTERONE) CREA     levothyroxine (SYNTHROID/LEVOTHROID) 100 MCG tablet     liothyronine (CYTOMEL) 5 MCG tablet     progesterone (PROMETRIUM) 100 MG capsule     zolpidem (AMBIEN) 10 MG tablet     cyclobenzaprine (FLEXERIL) 10 MG tablet     tofacitinib (XELJANZ) 5 MG tablet     No current facility-administered medications for this visit.      Past Medical History:   Patient Active Problem List   Diagnosis     Fatigue     No past medical history on file.    CC No referring provider defined for this encounter. on close of this encounter.      Again, thank you for allowing me to participate in the care of your patient.      Sincerely,    Zainab Oliver MD

## 2023-01-17 NOTE — LETTER
Date:March 27, 2023      Patient was self referred, no letter generated. Do not send.        [facility] Health Information

## 2023-01-18 ENCOUNTER — TELEPHONE (OUTPATIENT)
Dept: DERMATOLOGY | Facility: CLINIC | Age: 53
End: 2023-01-18
Payer: COMMERCIAL

## 2023-01-18 NOTE — TELEPHONE ENCOUNTER
Spoke to patient.  She is aware her appointment at 2:25 pm on 4/4 in [facility] will be switched to the [facility] at 2:40 pm due to changes in Dr. Oliver's afternoon schedule.

## 2023-01-20 ENCOUNTER — TELEPHONE (OUTPATIENT)
Dept: DERMATOLOGY | Facility: CLINIC | Age: 53
End: 2023-01-20

## 2023-01-20 NOTE — TELEPHONE ENCOUNTER
Prior Authorization Retail Medication Request    Medication/Dose: bimatoprost (LATISSE) 0.03 % external opthalmic solution   ICD code (if different than what is on RX):    Previously Tried and Failed:  See Provider Note  Rationale:  See Provider Note    Insurance Name:  [email or web address]  Insurance ID:  [id number]       Attending

## 2023-01-24 NOTE — TELEPHONE ENCOUNTER
Central Prior Authorization Team   Phone: [phone]    PA Initiation    Medication: bimatoprost (LATISSE) 0.03 % external opthalmic solution   Insurance Company: [email or web address] - Phone [phone] Fax [phone]  Pharmacy Filling the Rx: [id number] - [address] [facility]   Filling Pharmacy Phone: [phone]  Filling Pharmacy Fax: [phone]  Start Date: 1/24/2023

## 2023-01-27 ENCOUNTER — TELEPHONE (OUTPATIENT)
Dept: DERMATOLOGY | Facility: CLINIC | Age: 53
End: 2023-01-27
Payer: COMMERCIAL

## 2023-01-27 NOTE — TELEPHONE ENCOUNTER
Per fax from pharmacy: Plan requires specific directions to process the prescription for BPO wash 5%    I provided the details requested    Deja Farrar LPN

## 2023-01-30 NOTE — TELEPHONE ENCOUNTER
PRIOR AUTHORIZATION DENIED    Medication: bimatoprost (LATISSE) 0.03 % external opthalmic solution -PA DENIED     Denial Date: 1/27/2023    Denial Rational:         Appeal Information:

## 2023-02-13 RX ORDER — MINOXIDIL 2.5 MG/1
TABLET ORAL
Qty: 60 TABLET | Refills: 3 | Status: SHIPPED | OUTPATIENT
Start: 2023-02-13 | End: 2023-08-07

## 2023-02-22 NOTE — TELEPHONE ENCOUNTER
Her, Arthur  [facility] 3 weeks ago     KT AWAN DENIED. If you would like to appeal. Please supply letter medical necessity and place in patients chart then route back PA team. Otherwise please notify the patient and close encounter when finished.   Thank You George PA Team.

## 2023-02-28 ENCOUNTER — MYC MEDICAL ADVICE (OUTPATIENT)
Dept: DERMATOLOGY | Facility: CLINIC | Age: 53
End: 2023-02-28
Payer: COMMERCIAL

## 2023-02-28 ENCOUNTER — MYC MEDICAL ADVICE (OUTPATIENT)
Dept: DERMATOLOGY | Facility: CLINIC | Age: 53
End: 2023-02-28

## 2023-02-28 NOTE — LETTER
3/30/2023    Re: Medical Condition  Patient: Lupe Deluan  : 1970      To Whom it May Concern:      I have been treating patient Lupe Deluna for a medical condition called: alopecia areata. This disease is a recurrent and common non-scarring type of autoimmune hair loss that can affect any hair-bearing area. The disease can manifest in many different patterns including loss of scalp and body , eyebrow and eyelash fibers and hair in all hair bearing areas. .  Although most patients are asymptomatic,this disease can be associated with emotional and psychosocial distress. Treatment of this disease includes the use of topical and oral anti-inflammatory medications which Ms. Deluna is doing  Treatment also includes wearing a scalp prosthesis. A prescription has been written for this has been written and provided to Ms. Deluna.      Please contact me at Dept: [phone] if you require additional information.          Sincerely,    Zainab Oliver MD  Professor and Chair  Department of Dermatology  [facility]

## 2023-03-06 ENCOUNTER — MYC MEDICAL ADVICE (OUTPATIENT)
Dept: DERMATOLOGY | Facility: CLINIC | Age: 53
End: 2023-03-06
Payer: COMMERCIAL

## 2023-03-06 NOTE — TELEPHONE ENCOUNTER
Prior Authorization (bimatoprost (LATISSE) 0.03 % external opthalmic solution -PA DENIED )  (Newest Message First)  Zainab Oliver MD  You; Yuan Ramirez 10 days ago       Please let her know that the Latisse medication was denied. She could try goodrx and see if this will work for her. If yes, we can prepare a paper script for her.   Regards,         Will send [employer] message to patient  Deja Farrar LPN

## 2023-03-24 ENCOUNTER — LAB (OUTPATIENT)
Dept: LAB | Facility: CLINIC | Age: 53
End: 2023-03-24
Payer: COMMERCIAL

## 2023-03-24 DIAGNOSIS — Z51.81 MEDICATION MONITORING ENCOUNTER: ICD-10-CM

## 2023-03-24 DIAGNOSIS — E07.9 THYROID DYSFUNCTION: Primary | ICD-10-CM

## 2023-03-24 DIAGNOSIS — E03.9 HYPOTHYROID: ICD-10-CM

## 2023-03-24 LAB
ALBUMIN SERPL-MCNC: 4.4 G/DL (ref 3.4–5)
ALP SERPL-CCNC: 78 U/L (ref 40–150)
ALT SERPL W P-5'-P-CCNC: 23 U/L (ref 0–50)
ANION GAP SERPL CALCULATED.3IONS-SCNC: 3 MMOL/L (ref 3–14)
AST SERPL W P-5'-P-CCNC: 19 U/L (ref 0–45)
BASOPHILS # BLD AUTO: 0 10E3/UL (ref 0–0.2)
BASOPHILS NFR BLD AUTO: 0 %
BILIRUB SERPL-MCNC: 1.3 MG/DL (ref 0.2–1.3)
BUN SERPL-MCNC: 14 MG/DL (ref 7–30)
CALCIUM SERPL-MCNC: 9.8 MG/DL (ref 8.5–10.1)
CHLORIDE BLD-SCNC: 105 MMOL/L (ref 94–109)
CHOLEST SERPL-MCNC: 207 MG/DL
CO2 SERPL-SCNC: 28 MMOL/L (ref 20–32)
CREAT SERPL-MCNC: 0.66 MG/DL (ref 0.52–1.04)
EOSINOPHIL # BLD AUTO: 0.1 10E3/UL (ref 0–0.7)
EOSINOPHIL NFR BLD AUTO: 2 %
ERYTHROCYTE [DISTWIDTH] IN BLOOD BY AUTOMATED COUNT: 14.9 % (ref 10–15)
FASTING STATUS PATIENT QL REPORTED: YES
GFR SERPL CREATININE-BSD FRML MDRD: >90 ML/MIN/1.73M2
GLUCOSE BLD-MCNC: 104 MG/DL (ref 70–99)
HCT VFR BLD AUTO: 39.4 % (ref 35–47)
HDLC SERPL-MCNC: 73 MG/DL
HGB BLD-MCNC: 12.9 G/DL (ref 11.7–15.7)
LDLC SERPL CALC-MCNC: 113 MG/DL
LYMPHOCYTES # BLD AUTO: 2.7 10E3/UL (ref 0.8–5.3)
LYMPHOCYTES NFR BLD AUTO: 38 %
MCH RBC QN AUTO: 26.3 PG (ref 26.5–33)
MCHC RBC AUTO-ENTMCNC: 32.7 G/DL (ref 31.5–36.5)
MCV RBC AUTO: 80 FL (ref 78–100)
MONOCYTES # BLD AUTO: 0.6 10E3/UL (ref 0–1.3)
MONOCYTES NFR BLD AUTO: 8 %
NEUTROPHILS # BLD AUTO: 3.7 10E3/UL (ref 1.6–8.3)
NEUTROPHILS NFR BLD AUTO: 52 %
NONHDLC SERPL-MCNC: 134 MG/DL
PLATELET # BLD AUTO: 420 10E3/UL (ref 150–450)
POTASSIUM BLD-SCNC: 4.6 MMOL/L (ref 3.4–5.3)
PROT SERPL-MCNC: 7.3 G/DL (ref 6.8–8.8)
RBC # BLD AUTO: 4.9 10E6/UL (ref 3.8–5.2)
SODIUM SERPL-SCNC: 136 MMOL/L (ref 133–144)
TRIGL SERPL-MCNC: 104 MG/DL
WBC # BLD AUTO: 7.2 10E3/UL (ref 4–11)

## 2023-03-24 PROCEDURE — 36415 COLL VENOUS BLD VENIPUNCTURE: CPT

## 2023-03-24 PROCEDURE — 84481 FREE ASSAY (FT-3): CPT

## 2023-03-24 PROCEDURE — 84439 ASSAY OF FREE THYROXINE: CPT

## 2023-03-24 PROCEDURE — 80050 GENERAL HEALTH PANEL: CPT

## 2023-03-24 PROCEDURE — 80061 LIPID PANEL: CPT

## 2023-03-27 ENCOUNTER — MYC MEDICAL ADVICE (OUTPATIENT)
Dept: DERMATOLOGY | Facility: CLINIC | Age: 53
End: 2023-03-27
Payer: COMMERCIAL

## 2023-03-27 LAB
T4 FREE SERPL-MCNC: 1.58 NG/DL (ref 0.76–1.46)
TSH SERPL DL<=0.005 MIU/L-ACNC: <0.01 MU/L (ref 0.4–4)

## 2023-03-28 LAB — T3FREE SERPL-MCNC: 5.3 PG/ML (ref 2–4.4)

## 2023-03-30 ENCOUNTER — TELEPHONE (OUTPATIENT)
Dept: DERMATOLOGY | Facility: CLINIC | Age: 53
End: 2023-03-30

## 2023-03-30 NOTE — TELEPHONE ENCOUNTER
[facility] Call Center    Phone Message    May a detailed message be left on voicemail: yes     Reason for Call: Other: Pt Lupe states she needs a letter of medical necessity for wigs because she has alopecia and is completely bald.     Pt states she has requested the letter multiple times through [employer] over the past two months and has been told repeatedly that the message was forwarded to Dr. Oliver.     Pt has a visit on 4/4/23 and requests the letter be ready for her to  at the start of the visit.     Please call Lupe back with any questions at [phone].     Thank you.     Action Taken: Message routed to:  [facility] Derm    Travel Screening: Not Applicable

## 2023-04-04 ENCOUNTER — OFFICE VISIT (OUTPATIENT)
Dept: DERMATOLOGY | Facility: CLINIC | Age: 53
End: 2023-04-04
Payer: COMMERCIAL

## 2023-04-04 VITALS — HEART RATE: 67 BPM | SYSTOLIC BLOOD PRESSURE: 154 MMHG | DIASTOLIC BLOOD PRESSURE: 97 MMHG

## 2023-04-04 DIAGNOSIS — M35.9 AUTOIMMUNE DISEASE (H): ICD-10-CM

## 2023-04-04 DIAGNOSIS — R03.0 ELEVATED BLOOD PRESSURE READING: ICD-10-CM

## 2023-04-04 DIAGNOSIS — L63.1 ALOPECIA AREATA UNIVERSALIS: Primary | ICD-10-CM

## 2023-04-04 PROCEDURE — 99214 OFFICE O/P EST MOD 30 MIN: CPT | Mod: GC | Performed by: DERMATOLOGY

## 2023-04-04 ASSESSMENT — PAIN SCALES - GENERAL: PAINLEVEL: NO PAIN (0)

## 2023-04-04 NOTE — NURSING NOTE
Dermatology Rooming Note    Lupe Deluna's goals for this visit include:   Chief Complaint   Patient presents with     Hair Loss     HL Follow-up: reports condition as worse on Olumiant, no new re-growth     Deja Farrar LPN

## 2023-04-04 NOTE — PROGRESS NOTES
[facility] Dermatology Note  Encounter Date: Apr 4, 2023  Office Visit     Dermatology Problem List:  # Alopecia universalis  - Biopsies: 9/24/20 (consistent with alopecia areata; full report in Media tab)  - Prior Tx: baricitinib 4mg  - Current tx: tofacitinib 5mg BID   - Restarted 4/4/23   - Safety labs: stable (3/2023)  # Elevated blood pressure reading - followup with primary care  ____________________________________________    Assessment & Plan:  # Alopecia Areata/Universalis  Minimal improvement on baricitinib. Will transition back to tofacitinib 5mg BID as patient was having hair regrowth on regimen. Will provide prescription for scalp prosthesis as well as letter of medical necessity for HSA. No labs needed (last safety labs 3/2023). Has appt tomorrow (4/5/23) with provider managing thyroid replacement hormone  - Transitioned back to tofacitinib 5mg BID  - Prescribed scalp prosthesis   - Local print for prescription   - Discussed scalp prosthesis/wig as an alternative option and that this may be covered by insurance due to the severity of hair loss    Procedures Performed:   None    Return to clinic in 4m    Patient was seen and staffed with attending physician, Dr. Benito Forbes MD  Med/Derm PGY-5  P: [phone]    Staff attestation:    Patient was seen and examined with the medicine/dermatology resident. I agree with the history, review of systems, physical examination, assessments and plan.    Zainab Oliver MD  Professor and  Chair  Department of Dermatology  [facility]  ____________________________________________    CC: Hair Loss (HL Follow-up: reports condition as worse on Olumiant, no new re-growth)      HPI:  Ms. Lupe Deluna is a 52 year old female who presents as a follow-up patient for hair loss, diagnosed as alopecia totalis.     - Last seen in clinic on 1/2023  - Current tx: baricitinib 4mg daily  - Scalp pain: no  - Scalp burning:  no  - Scalp itching: no  - Eyebrow or eyelash changes since last visit: no  - Nail changes since last visit: no  - Overall course: no significant response to baricitinib. Will plan to transition back to tofacitinib given patient was having improvement on that regimen  - No other concerns today and in general state of health.     Labs:  Reviewed.    Physical Exam:  Vitals: BP (!) 154/97   Pulse 67   GEN: Well developed, well-nourished, in no acute distress, in a pleasant mood.    SKIN: Focused examination of the scalp, face, hands showed:  - fine fibers on forehead, eyebrows, and eyelashes  - no perifollicular erythema  - no scaling of the scalp   - Nails no pitting or dystrophy; dry with longitudinal ridging  - In comparison to prior photographs, no change                        Medications:  Current Outpatient Medications   Medication     Baricitinib 4 MG TABS     benzoyl peroxide 5 % external liquid     bimatoprost (LATISSE) 0.03 % external opthalmic solution     clindamycin (CLEOCIN T) 1 % external solution     Estradiol-Estriol-Progesterone (BIEST/PROGESTERONE) CREA     ketoconazole (NIZORAL) 2 % external shampoo     levothyroxine (SYNTHROID/LEVOTHROID) 100 MCG tablet     liothyronine (CYTOMEL) 5 MCG tablet     minoxidil (LONITEN) 2.5 MG tablet     progesterone (PROMETRIUM) 100 MG capsule     zolpidem (AMBIEN) 10 MG tablet     cyclobenzaprine (FLEXERIL) 10 MG tablet     tofacitinib (XELJANZ) 5 MG tablet     No current facility-administered medications for this visit.      Past Medical/Surgical History:   Patient Active Problem List   Diagnosis     Fatigue     No past medical history on file.  No past surgical history on file.    CC No referring provider defined for this encounter. on close of this encounter.

## 2023-04-04 NOTE — LETTER
4/4/2023       RE: Lupe Deluna  [address] [facility] [facility] [address]     Dear Colleague,    Thank you for referring your patient, Lupe Deluna, to the [facility] DERMATOLOGY CLINIC [place] at [facility]. Please see a copy of my visit note below.    [facility] Dermatology Note  Encounter Date: Apr 4, 2023  Office Visit     Dermatology Problem List:  # Alopecia universalis  - Biopsies: 9/24/20 (consistent with alopecia areata; full report in Media tab)  - Prior Tx: baricitinib 4mg  - Current tx: tofacitinib 5mg BID   - Restarted 4/4/23   - Safety labs: stable (3/2023)  # Elevated blood pressure reading - followup with primary care  ____________________________________________    Assessment & Plan:  # Alopecia Areata/Universalis  Minimal improvement on baricitinib. Will transition back to tofacitinib 5mg BID as patient was having hair regrowth on regimen. Will provide prescription for scalp prosthesis as well as letter of medical necessity for HSA. No labs needed (last safety labs 3/2023). Has appt tomorrow (4/5/23) with provider managing thyroid replacement hormone  - Transitioned back to tofacitinib 5mg BID  - Prescribed scalp prosthesis   - Local print for prescription   - Discussed scalp prosthesis/wig as an alternative option and that this may be covered by insurance due to the severity of hair loss    Procedures Performed:   None    Return to clinic in 4m    Patient was seen and staffed with attending physician, Dr. Benito Forbes MD  Med/Derm PGY-5  P: [phone]    Staff attestation:    Patient was seen and examined with the medicine/dermatology resident. I agree with the history, review of systems, physical examination, assessments and plan.    Zainab Oliver MD  Professor and  Chair  Department of Dermatology  [facility]  ____________________________________________    CC: Hair  Loss (HL Follow-up: reports condition as worse on Olumiant, no new re-growth)      HPI:  Ms. Lupe Deluna is a 52 year old female who presents as a follow-up patient for hair loss, diagnosed as alopecia totalis.     - Last seen in clinic on 1/2023  - Current tx: baricitinib 4mg daily  - Scalp pain: no  - Scalp burning: no  - Scalp itching: no  - Eyebrow or eyelash changes since last visit: no  - Nail changes since last visit: no  - Overall course: no significant response to baricitinib. Will plan to transition back to tofacitinib given patient was having improvement on that regimen  - No other concerns today and in general state of health.     Labs:  Reviewed.    Physical Exam:  Vitals: BP (!) 154/97   Pulse 67   GEN: Well developed, well-nourished, in no acute distress, in a pleasant mood.    SKIN: Focused examination of the scalp, face, hands showed:  - fine fibers on forehead, eyebrows, and eyelashes  - no perifollicular erythema  - no scaling of the scalp   - Nails no pitting or dystrophy; dry with longitudinal ridging  - In comparison to prior photographs, no change                        Medications:  Current Outpatient Medications   Medication     Baricitinib 4 MG TABS     benzoyl peroxide 5 % external liquid     bimatoprost (LATISSE) 0.03 % external opthalmic solution     clindamycin (CLEOCIN T) 1 % external solution     Estradiol-Estriol-Progesterone (BIEST/PROGESTERONE) CREA     ketoconazole (NIZORAL) 2 % external shampoo     levothyroxine (SYNTHROID/LEVOTHROID) 100 MCG tablet     liothyronine (CYTOMEL) 5 MCG tablet     minoxidil (LONITEN) 2.5 MG tablet     progesterone (PROMETRIUM) 100 MG capsule     zolpidem (AMBIEN) 10 MG tablet     cyclobenzaprine (FLEXERIL) 10 MG tablet     tofacitinib (XELJANZ) 5 MG tablet     No current facility-administered medications for this visit.      Past Medical/Surgical History:   Patient Active Problem List   Diagnosis     Fatigue     No past medical history on  file.  No past surgical history on file.    CC No referring provider defined for this encounter. on close of this encounter.        Again, thank you for allowing me to participate in the care of your patient.      Sincerely,    Zainab Oliver MD

## 2023-04-05 ENCOUNTER — TELEPHONE (OUTPATIENT)
Dept: DERMATOLOGY | Facility: CLINIC | Age: 53
End: 2023-04-05

## 2023-04-05 NOTE — TELEPHONE ENCOUNTER
[facility] Call Center    Phone Message    May a detailed message be left on voicemail: no     Reason for Call: Other: Pt Lupe called and stated that she needed a four month follow up with Dr Oliver. I scheduled her on January ninth but will probably need to be sooner if it is four month follow up.       Action Taken: Message routed to:  [facility] derm    Travel Screening: Not Applicable

## 2023-04-12 NOTE — TELEPHONE ENCOUNTER
I am asking Dr. Oliver about a specific day in August, will update patient when I hear back.    Deja Farrar LPN

## 2023-05-17 ENCOUNTER — APPOINTMENT (OUTPATIENT)
Dept: URBAN - METROPOLITAN AREA CLINIC 252 | Age: 53
Setting detail: DERMATOLOGY
End: 2023-05-21

## 2023-05-17 VITALS — HEIGHT: 55 IN | RESPIRATION RATE: 18 BRPM | WEIGHT: 150 LBS

## 2023-05-17 DIAGNOSIS — L57.0 ACTINIC KERATOSIS: ICD-10-CM

## 2023-05-17 DIAGNOSIS — L63.0 ALOPECIA (CAPITIS) TOTALIS: ICD-10-CM

## 2023-05-17 DIAGNOSIS — L81.4 OTHER MELANIN HYPERPIGMENTATION: ICD-10-CM

## 2023-05-17 DIAGNOSIS — L91.8 OTHER HYPERTROPHIC DISORDERS OF THE SKIN: ICD-10-CM

## 2023-05-17 DIAGNOSIS — L60.9 NAIL DISORDER, UNSPECIFIED: ICD-10-CM

## 2023-05-17 DIAGNOSIS — L82.1 OTHER SEBORRHEIC KERATOSIS: ICD-10-CM

## 2023-05-17 PROCEDURE — OTHER NAIL CLIPPING FOR PATHOLOGY: OTHER

## 2023-05-17 PROCEDURE — 99213 OFFICE O/P EST LOW 20 MIN: CPT | Mod: 25

## 2023-05-17 PROCEDURE — OTHER COUNSELING: OTHER

## 2023-05-17 PROCEDURE — OTHER LIQUID NITROGEN: OTHER

## 2023-05-17 PROCEDURE — 17000 DESTRUCT PREMALG LESION: CPT

## 2023-05-17 ASSESSMENT — LOCATION DETAILED DESCRIPTION DERM
LOCATION DETAILED: UPPER STERNUM
LOCATION DETAILED: POSTERIOR MID-PARIETAL SCALP
LOCATION DETAILED: LEFT PROXIMAL DORSAL FOREARM
LOCATION DETAILED: LEFT LATERAL SUPERIOR EYELID
LOCATION DETAILED: RIGHT PROXIMAL RADIAL DORSAL FOREARM
LOCATION DETAILED: RIGHT 2ND TOENAIL
LOCATION DETAILED: LEFT ANTERIOR SHOULDER
LOCATION DETAILED: RIGHT AXILLARY VAULT
LOCATION DETAILED: RIGHT CENTRAL EYEBROW
LOCATION DETAILED: RIGHT LATERAL SUPERIOR EYELID
LOCATION DETAILED: LEFT AXILLARY VAULT
LOCATION DETAILED: LEFT CENTRAL EYEBROW
LOCATION DETAILED: LEFT CENTRAL MALAR CHEEK

## 2023-05-17 ASSESSMENT — LOCATION SIMPLE DESCRIPTION DERM
LOCATION SIMPLE: RIGHT FOREARM
LOCATION SIMPLE: RIGHT 2ND TOE
LOCATION SIMPLE: POSTERIOR SCALP
LOCATION SIMPLE: RIGHT SUPERIOR EYELID
LOCATION SIMPLE: LEFT FOREARM
LOCATION SIMPLE: LEFT SHOULDER
LOCATION SIMPLE: RIGHT EYEBROW
LOCATION SIMPLE: LEFT AXILLARY VAULT
LOCATION SIMPLE: RIGHT AXILLARY VAULT
LOCATION SIMPLE: LEFT CHEEK
LOCATION SIMPLE: LEFT EYEBROW
LOCATION SIMPLE: CHEST
LOCATION SIMPLE: LEFT SUPERIOR EYELID

## 2023-05-17 ASSESSMENT — LOCATION ZONE DERM
LOCATION ZONE: SCALP
LOCATION ZONE: AXILLAE
LOCATION ZONE: FACE
LOCATION ZONE: TRUNK
LOCATION ZONE: EYELID
LOCATION ZONE: TOENAIL
LOCATION ZONE: ARM

## 2023-05-17 NOTE — PROCEDURE: LIQUID NITROGEN
Application Tool (Optional): Liquid Nitrogen Sprayer
Show Aperture Variable?: Yes
Consent: - Discussed that these are a result of cumulative sun exposure.\\n- Verbal and written consent was obtained, and risks were reviewed prior to procedure today. \\n- Risks discussed include but are not limited to pain, crusting, scabbing, blistering, scarring, temporary or permanent darker or lighter pigmentary change, recurrence, incomplete resolution, and infection.
Detail Level: Detailed
Post-Care Instructions: - Patient was instructed to avoid picking at any of the treated lesions.

## 2023-05-17 NOTE — PROCEDURE: COUNSELING
Detail Level: Detailed
Detail Level: Zone
Patient Specific Counseling (Will Not Stick From Patient To Patient): ***Patient being overseen by Dr Olivera, treated with Xeljanz and oral minoxidil

## 2023-05-23 NOTE — TELEPHONE ENCOUNTER
Patient scheduled for follow-up in August and was provided with appointment details. She had no further questions or concerns at this time.    Deja Farrar LPN

## 2023-06-05 ENCOUNTER — RX ONLY (RX ONLY)
Age: 53
End: 2023-06-05

## 2023-06-05 RX ORDER — UREA 40 G/100G
40% CREAM TOPICAL TWICE A DAY
Qty: 28.35 | Refills: 0 | Status: ERX

## 2023-06-05 RX ORDER — UREA 40 %
40% CREAM (GRAM) TOPICAL TWICE A DAY
Qty: 28 | Refills: 1 | Status: ERX | COMMUNITY
Start: 2023-06-05

## 2023-08-07 ENCOUNTER — OFFICE VISIT (OUTPATIENT)
Dept: DERMATOLOGY | Facility: CLINIC | Age: 53
End: 2023-08-07
Payer: COMMERCIAL

## 2023-08-07 ENCOUNTER — LAB (OUTPATIENT)
Dept: LAB | Facility: CLINIC | Age: 53
End: 2023-08-07
Payer: COMMERCIAL

## 2023-08-07 VITALS — DIASTOLIC BLOOD PRESSURE: 80 MMHG | SYSTOLIC BLOOD PRESSURE: 136 MMHG | HEART RATE: 56 BPM

## 2023-08-07 DIAGNOSIS — Z51.81 MEDICATION MONITORING ENCOUNTER: ICD-10-CM

## 2023-08-07 DIAGNOSIS — L63.9 ALOPECIA AREATA: ICD-10-CM

## 2023-08-07 DIAGNOSIS — M35.9 AUTOIMMUNE DISEASE (H): ICD-10-CM

## 2023-08-07 DIAGNOSIS — Z51.81 MEDICATION MONITORING ENCOUNTER: Primary | ICD-10-CM

## 2023-08-07 LAB
ALBUMIN SERPL BCG-MCNC: 5 G/DL (ref 3.5–5.2)
ALP SERPL-CCNC: 59 U/L (ref 35–104)
ALT SERPL W P-5'-P-CCNC: 15 U/L (ref 0–50)
ANION GAP SERPL CALCULATED.3IONS-SCNC: 9 MMOL/L (ref 7–15)
AST SERPL W P-5'-P-CCNC: 21 U/L (ref 0–45)
BASOPHILS # BLD AUTO: 0.1 10E3/UL (ref 0–0.2)
BASOPHILS NFR BLD AUTO: 1 %
BILIRUB SERPL-MCNC: 0.6 MG/DL
BUN SERPL-MCNC: 15.5 MG/DL (ref 6–20)
CALCIUM SERPL-MCNC: 9.9 MG/DL (ref 8.6–10)
CHLORIDE SERPL-SCNC: 106 MMOL/L (ref 98–107)
CHOLEST SERPL-MCNC: 224 MG/DL
CREAT SERPL-MCNC: 0.93 MG/DL (ref 0.51–0.95)
DEPRECATED HCO3 PLAS-SCNC: 27 MMOL/L (ref 22–29)
EOSINOPHIL # BLD AUTO: 0.1 10E3/UL (ref 0–0.7)
EOSINOPHIL NFR BLD AUTO: 1 %
ERYTHROCYTE [DISTWIDTH] IN BLOOD BY AUTOMATED COUNT: 14.1 % (ref 10–15)
GFR SERPL CREATININE-BSD FRML MDRD: 74 ML/MIN/1.73M2
GLUCOSE SERPL-MCNC: 113 MG/DL (ref 70–99)
HCT VFR BLD AUTO: 37.5 % (ref 35–47)
HDLC SERPL-MCNC: 78 MG/DL
HGB BLD-MCNC: 12.3 G/DL (ref 11.7–15.7)
IMM GRANULOCYTES # BLD: 0.1 10E3/UL
IMM GRANULOCYTES NFR BLD: 1 %
LDLC SERPL CALC-MCNC: 102 MG/DL
LYMPHOCYTES # BLD AUTO: 3.2 10E3/UL (ref 0.8–5.3)
LYMPHOCYTES NFR BLD AUTO: 37 %
MCH RBC QN AUTO: 27.6 PG (ref 26.5–33)
MCHC RBC AUTO-ENTMCNC: 32.8 G/DL (ref 31.5–36.5)
MCV RBC AUTO: 84 FL (ref 78–100)
MONOCYTES # BLD AUTO: 0.5 10E3/UL (ref 0–1.3)
MONOCYTES NFR BLD AUTO: 6 %
NEUTROPHILS # BLD AUTO: 4.7 10E3/UL (ref 1.6–8.3)
NEUTROPHILS NFR BLD AUTO: 54 %
NONHDLC SERPL-MCNC: 146 MG/DL
NRBC # BLD AUTO: 0 10E3/UL
NRBC BLD AUTO-RTO: 0 /100
PLATELET # BLD AUTO: 322 10E3/UL (ref 150–450)
POTASSIUM SERPL-SCNC: 4 MMOL/L (ref 3.4–5.3)
PROT SERPL-MCNC: 7 G/DL (ref 6.4–8.3)
RBC # BLD AUTO: 4.45 10E6/UL (ref 3.8–5.2)
SODIUM SERPL-SCNC: 142 MMOL/L (ref 136–145)
T3FREE SERPL-MCNC: 2.9 PG/ML (ref 2–4.4)
TRIGL SERPL-MCNC: 221 MG/DL
TSH SERPL DL<=0.005 MIU/L-ACNC: 1.49 UIU/ML (ref 0.3–4.2)
WBC # BLD AUTO: 8.5 10E3/UL (ref 4–11)

## 2023-08-07 PROCEDURE — 80061 LIPID PANEL: CPT | Performed by: PATHOLOGY

## 2023-08-07 PROCEDURE — 80053 COMPREHEN METABOLIC PANEL: CPT | Performed by: PATHOLOGY

## 2023-08-07 PROCEDURE — 84481 FREE ASSAY (FT-3): CPT | Performed by: DERMATOLOGY

## 2023-08-07 PROCEDURE — 99000 SPECIMEN HANDLING OFFICE-LAB: CPT | Performed by: PATHOLOGY

## 2023-08-07 PROCEDURE — 99214 OFFICE O/P EST MOD 30 MIN: CPT | Performed by: DERMATOLOGY

## 2023-08-07 PROCEDURE — 85025 COMPLETE CBC W/AUTO DIFF WBC: CPT | Performed by: PATHOLOGY

## 2023-08-07 PROCEDURE — 84443 ASSAY THYROID STIM HORMONE: CPT | Performed by: PATHOLOGY

## 2023-08-07 PROCEDURE — 36415 COLL VENOUS BLD VENIPUNCTURE: CPT | Performed by: PATHOLOGY

## 2023-08-07 RX ORDER — NALTREXONE HYDROCHLORIDE 50 MG/1
TABLET, FILM COATED ORAL
COMMUNITY
Start: 2023-07-10 | End: 2023-10-02

## 2023-08-07 NOTE — NURSING NOTE
Dermatology Rooming Note    Lupe Deluna's goals for this visit include:   Chief Complaint   Patient presents with    Hair Loss     Return for hair loss, pt states she is growing hair back.     Isidoro Marie, EMT-B

## 2023-08-07 NOTE — LETTER
8/7/2023       RE: Lupe Deluna  [address] [facility] [address]     Dear Colleague,    Thank you for referring your patient, Lupe Deluna, to the [facility] DERMATOLOGY CLINIC [place] at [facility]. Please see a copy of my visit note below.    See completed note      Dermatology Rooming Note    Lupe NICOLAS Regi's goals for this visit include:   Chief Complaint   Patient presents with     Hair Loss     Return for hair loss, pt states she is growing hair back.     Isidoro Marie EMT-B      Dermatology Rooming Note    Lupe NICOLAS Regi's goals for this visit include:   Chief Complaint   Patient presents with     Hair Loss     Return for hair loss, pt states she is growing hair back.     Isidoro Marie EMT-B      [facility] Dermatology Note  Encounter Date: Aug 7, 2023  Office Visit     Dermatology Problem List:  # Alopecia universalis,now with evidence of hair regrowth  - Biopsies: 9/24/20 (consistent with alopecia areata; full report in Media tab)  - Prior Tx: baricitinib 4mg  - Current tx: tofacitinib 5mg BID              - Restarted 4/4/23              - Safety labs: stable (3/2023) and ordered today 8/7/23  ____________________________________________     Assessment & Plan:  # Alopecia Areata/Universalis  Minimal improvement on baricitinib. Transition edback to tofacitinib 5mg BID as patient was having hair regrowth on regimen.   - Transitioned back to tofacitinib 5mg BID  - Prescribed scalp prosthesis at last visit              - Local print for prescription              - Discussed scalp prosthesis/wig as an alternative option and that this may be covered by insurance due to the severity of hair loss    Procedures Performed:   None    Follow-up: 5 month(s) in-person, or earlier for new or changing lesions    ____________________________________________    CC: No chief complaint on file.    HPI:  Ms. Lupe VALENZUELA  Regi is a 52 year old female who presents as a return patient for follow-up of hair loss, diagnosed as alopecia areata [facility].   - Last seen on 4/4/23  - Current tx: tofacitinib 5 mg twice daily  - Overall course: now with evidence of vellus and indeterminate hair growth  Ms. Lupe Deluna is a 52 year old female who presents as a follow-up patient for hair loss, diagnosed as alopecia totalis.    - Scalp pain: no  - Scalp burning: no  - Scalp itching: no  - Eyebrow or eyelash changes since last visit: no  - Nail changes since last visit: no  - No other concerns today and in general state of health.     Patient is otherwise feeling well, in usual state of health, and has no additional skin concerns today.     Labs:   Safety labs ordered for today - lipids, metabolic panel and CBC with idff    Physical Exam:  Vitals: There were no vitals taken for this visit.  GEN: Well developed, well-nourished, in no acute distress, in a pleasant mood.    SKIN: Focused examination of scalp, face and hands was performed.  SCALP -  - no diffuse erythema   - no perifollicular erythema  - no perifollicular scale   - no scaling of the scalp   - no scalp folliculitis/pustules   - In comparison to prior photographs, now with vellus and indeterminate fiber regrowth    - No other lesions of concern on areas examined.     Current Outpatient Medications   Medication     benzoyl peroxide 5 % external liquid     bimatoprost (LATISSE) 0.03 % external opthalmic solution     clindamycin (CLEOCIN T) 1 % external solution     cyclobenzaprine (FLEXERIL) 10 MG tablet     Estradiol-Estriol-Progesterone (BIEST/PROGESTERONE) CREA     ketoconazole (NIZORAL) 2 % external shampoo     levothyroxine (SYNTHROID/LEVOTHROID) 100 MCG tablet     liothyronine (CYTOMEL) 5 MCG tablet     minoxidil (LONITEN) 2.5 MG tablet     NONFORMULARY     progesterone (PROMETRIUM) 100 MG capsule     tofacitinib (XELJANZ) 5 MG tablet     tofacitinib (XELJANZ) 5 MG  tablet     zolpidem (AMBIEN) 10 MG tablet     No current facility-administered medications for this visit.      Past Medical History:   Patient Active Problem List   Diagnosis     Fatigue     No past medical history on file.    CC No referring provider defined for this encounter. on close of this encounter.      Again, thank you for allowing me to participate in the care of your patient.      Sincerely,    Zainab Oliver MD

## 2023-08-20 NOTE — PROGRESS NOTES
[facility] Dermatology Note  Encounter Date: Aug 7, 2023  Office Visit     Dermatology Problem List:  # Alopecia universalis,now with evidence of hair regrowth  - Biopsies: 9/24/20 (consistent with alopecia areata; full report in Media tab)  - Prior Tx: baricitinib 4mg  - Current tx: tofacitinib 5mg BID              - Restarted 4/4/23              - Safety labs: stable (3/2023) and ordered today 8/7/23  ____________________________________________     Assessment & Plan:  # Alopecia Areata/Universalis  Minimal improvement on baricitinib. Transition edback to tofacitinib 5mg BID as patient was having hair regrowth on regimen.   - Transitioned back to tofacitinib 5mg BID  - Prescribed scalp prosthesis at last visit              - Local print for prescription              - Discussed scalp prosthesis/wig as an alternative option and that this may be covered by insurance due to the severity of hair loss    Procedures Performed:   None    Follow-up: 5 month(s) in-person, or earlier for new or changing lesions    ____________________________________________    CC: No chief complaint on file.    HPI:  Ms. Lupe Deluna is a 52 year old female who presents as a return patient for follow-up of hair loss, diagnosed as alopecia areata universaolis.   - Last seen on 4/4/23  - Current tx: tofacitinib 5 mg twice daily  - Overall course: now with evidence of vellus and indeterminate hair growth  Ms. Lupe Deluna is a 52 year old female who presents as a follow-up patient for hair loss, diagnosed as alopecia totalis.    - Scalp pain: no  - Scalp burning: no  - Scalp itching: no  - Eyebrow or eyelash changes since last visit: no  - Nail changes since last visit: no  - No other concerns today and in general state of health.     Patient is otherwise feeling well, in usual state of health, and has no additional skin concerns today.     Labs:   Safety labs ordered for today - lipids, metabolic panel and CBC  with idff    Physical Exam:  Vitals: There were no vitals taken for this visit.  GEN: Well developed, well-nourished, in no acute distress, in a pleasant mood.    SKIN: Focused examination of scalp, face and hands was performed.  SCALP -  - no diffuse erythema   - no perifollicular erythema  - no perifollicular scale   - no scaling of the scalp   - no scalp folliculitis/pustules   - In comparison to prior photographs, now with vellus and indeterminate fiber regrowth    - No other lesions of concern on areas examined.     Current Outpatient Medications   Medication    benzoyl peroxide 5 % external liquid    bimatoprost (LATISSE) 0.03 % external opthalmic solution    clindamycin (CLEOCIN T) 1 % external solution    cyclobenzaprine (FLEXERIL) 10 MG tablet    Estradiol-Estriol-Progesterone (BIEST/PROGESTERONE) CREA    ketoconazole (NIZORAL) 2 % external shampoo    levothyroxine (SYNTHROID/LEVOTHROID) 100 MCG tablet    liothyronine (CYTOMEL) 5 MCG tablet    minoxidil (LONITEN) 2.5 MG tablet    NONFORMULARY    progesterone (PROMETRIUM) 100 MG capsule    tofacitinib (XELJANZ) 5 MG tablet    tofacitinib (XELJANZ) 5 MG tablet    zolpidem (AMBIEN) 10 MG tablet     No current facility-administered medications for this visit.      Past Medical History:   Patient Active Problem List   Diagnosis    Fatigue     No past medical history on file.    CC No referring provider defined for this encounter. on close of this encounter.

## 2023-09-13 ENCOUNTER — APPOINTMENT (OUTPATIENT)
Dept: URBAN - METROPOLITAN AREA CLINIC 257 | Age: 53
Setting detail: DERMATOLOGY
End: 2023-09-13

## 2023-09-13 DIAGNOSIS — L24 IRRITANT CONTACT DERMATITIS: ICD-10-CM

## 2023-09-13 DIAGNOSIS — L57.8 OTHER SKIN CHANGES DUE TO CHRONIC EXPOSURE TO NONIONIZING RADIATION: ICD-10-CM

## 2023-09-13 DIAGNOSIS — Z71.89 OTHER SPECIFIED COUNSELING: ICD-10-CM

## 2023-09-13 DIAGNOSIS — L82.1 OTHER SEBORRHEIC KERATOSIS: ICD-10-CM

## 2023-09-13 DIAGNOSIS — L81.4 OTHER MELANIN HYPERPIGMENTATION: ICD-10-CM

## 2023-09-13 DIAGNOSIS — D18.0 HEMANGIOMA: ICD-10-CM

## 2023-09-13 DIAGNOSIS — D22 MELANOCYTIC NEVI: ICD-10-CM

## 2023-09-13 PROBLEM — D18.01 HEMANGIOMA OF SKIN AND SUBCUTANEOUS TISSUE: Status: ACTIVE | Noted: 2023-09-13

## 2023-09-13 PROBLEM — D22.5 MELANOCYTIC NEVI OF TRUNK: Status: ACTIVE | Noted: 2023-09-13

## 2023-09-13 PROBLEM — L24.9 IRRITANT CONTACT DERMATITIS, UNSPECIFIED CAUSE: Status: ACTIVE | Noted: 2023-09-13

## 2023-09-13 PROCEDURE — OTHER PRESCRIPTION: OTHER

## 2023-09-13 PROCEDURE — OTHER COUNSELING: OTHER

## 2023-09-13 PROCEDURE — OTHER MIPS QUALITY: OTHER

## 2023-09-13 PROCEDURE — 99213 OFFICE O/P EST LOW 20 MIN: CPT

## 2023-09-13 RX ORDER — HYDROCORTISONE 25 MG/G
CREAM TOPICAL
Qty: 30 | Refills: 2 | Status: ERX | COMMUNITY
Start: 2023-09-13

## 2023-09-13 ASSESSMENT — LOCATION SIMPLE DESCRIPTION DERM
LOCATION SIMPLE: LEFT UPPER BACK
LOCATION SIMPLE: LEFT AXILLARY VAULT

## 2023-09-13 ASSESSMENT — LOCATION DETAILED DESCRIPTION DERM
LOCATION DETAILED: LEFT SUPERIOR MEDIAL UPPER BACK
LOCATION DETAILED: LEFT MEDIAL UPPER BACK
LOCATION DETAILED: LEFT AXILLARY VAULT

## 2023-09-13 ASSESSMENT — LOCATION ZONE DERM
LOCATION ZONE: AXILLAE
LOCATION ZONE: TRUNK

## 2023-10-02 ENCOUNTER — TELEPHONE (OUTPATIENT)
Dept: DERMATOLOGY | Facility: CLINIC | Age: 53
End: 2023-10-02
Payer: COMMERCIAL

## 2023-10-02 ENCOUNTER — VIRTUAL VISIT (OUTPATIENT)
Dept: RHEUMATOLOGY | Facility: CLINIC | Age: 53
End: 2023-10-02
Attending: DERMATOLOGY
Payer: COMMERCIAL

## 2023-10-02 DIAGNOSIS — Z71.89 ENCOUNTER FOR HERB AND VITAMIN SUPPLEMENT MANAGEMENT: ICD-10-CM

## 2023-10-02 DIAGNOSIS — L63.9 ALOPECIA AREATA: Primary | ICD-10-CM

## 2023-10-02 RX ORDER — RITLECITINIB 50 MG/1
50 CAPSULE ORAL DAILY
Qty: 30 CAPSULE | Refills: 3 | Status: SHIPPED | OUTPATIENT
Start: 2023-10-02 | End: 2024-01-16

## 2023-10-02 RX ORDER — MINOXIDIL 2.5 MG/1
1.25 TABLET ORAL DAILY
COMMUNITY
Start: 2023-02-13 | End: 2023-10-02

## 2023-10-02 RX ORDER — MINOXIDIL 2.5 MG/1
TABLET ORAL
Qty: 45 TABLET | Refills: 1 | Status: SHIPPED | OUTPATIENT
Start: 2023-10-02 | End: 2024-01-09

## 2023-10-02 RX ORDER — CYANOCOBALAMIN (VITAMIN B-12) 500 MCG
400 LOZENGE ORAL DAILY
COMMUNITY
End: 2024-06-14

## 2023-10-02 NOTE — Clinical Note
"10/2/2023       RE: Lupe Deluna  [address] [facility] [address] [address]     Dear Colleague,    Thank you for referring your patient, Lupe Deluna, to the [facility]UMATOLOG[facility] [place] [facility]. Please see a copy of my visit note below.    Medication Therapy Management (MTM) Encounter    ASSESSMENT:                            Medication Adherence/Access: {adherencechoices:010527}    ***:  ***      PLAN:                            ***    Follow-up: {followuptest2:477111}    SUBJECTIVE/OBJECTIVE:                          Lupe Deluna is a 53 year old female called for an initial visit. She was referred to me from Dr. Oliver.      Reason for visit: Discuss switch to Xeljanz to Litfulo.    Allergies/ADRs: {1/2/3/4/5:515653}  Past Medical History: {1/2/3/4/5:193920}  Tobacco: She reports that she has quit smoking. She has never used smokeless tobacco.  Alcohol: {ALCOHOL CONSUMPTION HX:707245}  {Social and Goals:349699}    Medication Adherence/Access: {fumedadherence:198820}    ***:   ***    Today's Vitals: There were no vitals taken for this visit.  ----------------  {JIM?:470487}    I spent {mtm total time 3:350317} with this patient today. { :226599}. A copy of the visit note was provided to the patient's provider(s).    A summary of these recommendations {GIVEN/NOT GIVEN:058850}.    ***    Telemedicine Visit Details  Type of service:  {telemedvisitmtm:143346::\"Telephone visit\"}  Start Time: {video/phone visit start time:152948}  End Time: {video/phone visit end time:152948}     Medication Therapy Recommendations  No medication therapy recommendations to display         Medication Therapy Management (MTM) Encounter    ASSESSMENT:                            Alopecia Areata/Universalis: Well controlled on current therapy, but unable to continue PAP program in 2024. I spoke with Dr. Rouse today who was agreeable to " switching her to Litfulo. Patient would like to start prior to 2024 to ensure she tolerates and it is effective, which I think is reasonable. Today we discussed the mechanism of action, dosing, side effects, and immunosuppression. If started, within one month will check CBC and LFTs (lipids after 3 months). If covered, we will likely be able to use a coupon or PAP since FDA approved.   Encouraged her to restart minoxidil as well, especially before restarting Litfulo. Per chart review, ok to be on both. Will refill today.   Due for Quantiferon TB Gold. Could consider rechecking her Hep B serologies in the future to ensure her vaccination was effective at achieving immunity.   Encouraged her to obtain influenza, COVID-19 and Prevnar 20 (declined influenza and COVID-19).     Supplements: Ok to continue supplements, last Vitamin D level normal but I recommended that she not increase her dose since it was at the high end of normal.     PLAN:                            Order sent for Litfulo 50 mg daily to [facility] (#[phone]). They will contact patient pending insurance coverage.   Minoxidil 2.5 mg half tablet (1.25 mg) daily sent to [facility]  Future Quantiferon TB Gold and Hep B serologies  Encouraged her to obtain influenza, COVID-19 and Prevnar 20 (declined influenza and COVID-19).     Follow-up: Kenny pending Litfulo coverage    SUBJECTIVE/OBJECTIVE:                          Lupe Deluna is a 53 year old female called for an initial visit. She was referred to me from Dr. Oliver.      Reason for visit: Discuss switch to Xeljanz to Litfulo.    Allergies/ADRs: Reviewed in chart  Past Medical History: Reviewed in chart  Tobacco: She reports that she has quit smoking. She has never used smokeless tobacco.  Alcohol: reviewed in chart    Medication Adherence/Access: Patient was familiar with her medications, but would like to be on the least amount of meds possible, therefore she has  "stopped medications in the past based on lab values.     Alopecia Areata/Universalis:   - Xeljanz 5 mg twice daily   Topicals on file -- she is not currently using but has at home if needed.   Currently her hair is filling in faster on Xeljanz but she is still \"patchy.\" Xeljanz more effective than Olumiant. Her eyelashes are coming in.   Was on minoxidil 2.5 mg half tablet but she stopped on her own to lessen the amount of meds she was taking, but she wonders if she can restart since she thinks it was helpful.   She is getting Xeljanz through PAP, but in 2024 the program will not allow her to get Xeljanz off label so she is open to Litfulo but would like to try it before 2024. She has Xeljanz at home and plans on refilling it again in Dec.   History of folliculitis on Olumiant.      Pre-Biologic Screening:   Hep B Surface Antibody  Hep B Core Antibody  Non-reactive (5/16/22)   Non-reactive (5/16/22)   Hep B Surface Antigen Non-reactive (5/16/22)    Hep C Antibody  Non-reactive (5/16/22)    HIV Antigen Antibody Non-reactive (5/16/22)    Quantiferon TB Gold Negative (5/16/22)    **Hep B 3-series completed 6/8/23    CBC - checked 8/7/23  CMP - checked 8/7/23, eGFR 74 mL/min    All patients on biologics should avoid live vaccines (varicella/VZV, intranasal influenza, MMR, or yellow fever vaccine (if traveling))      Immunization History   Administered Status   Covid-19 Booster Not done - patient declines   Influenza (annual, 4698-1166) Reports she is not sure if she will get this year. Last done 12/14/22 , avoid live FluMist   Pneumococcal  Prevnar-13: n/a  Pneumovax-23: n/a   Prevnar-20: n/a Due to receive     Tetanus/Tdap  Complete   Shingrix Complete   RSV (only for = 60 years old)  N/a     Supplements:   - Vitamin E 400 units daily   - Vitamin D 5000 units + K2  Last Vitamin D level = 70.3 on 11/422      Today's Vitals: There were no vitals taken for this visit.  ----------------      I spent 16 minutes with this " patient today. All changes were made via collaborative practice agreement with Zainab Oliver. A copy of the visit note was provided to the patient's provider(s).    A summary of these recommendations was sent via [employer].    Suyapa Cabrera, Pharm.D., [facility]   Medication Therapy Management Pharmacist   [facility] Dermatology    Telemedicine Visit Details  Type of service:  Telephone visit  Start Time:  12:32 PM  End Time:  12:48 PM     Medication Therapy Recommendations  No medication therapy recommendations to display           Again, thank you for allowing me to participate in the care of your patient.      Sincerely,    SUYAPA CARBERA, SushantD

## 2023-10-02 NOTE — PROGRESS NOTES
Medication Therapy Management (MTM) Encounter    ASSESSMENT:                            Alopecia Areata/Universalis: Well controlled on current therapy, but unable to continue PAP program in 2024. I spoke with Dr. Rouse today who was agreeable to switching her to Litfulo. Patient would like to start prior to 2024 to ensure she tolerates and it is effective, which I think is reasonable. Today we discussed the mechanism of action, dosing, side effects, and immunosuppression. If started, within one month will check CBC and LFTs (lipids after 3 months). If covered, we will likely be able to use a coupon or PAP since FDA approved.   Encouraged her to restart minoxidil as well, especially before restarting Litfulo. Per chart review, ok to be on both. Will refill today.   Due for Quantiferon TB Gold. Could consider rechecking her Hep B serologies in the future to ensure her vaccination was effective at achieving immunity.   Encouraged her to obtain influenza, COVID-19 and Prevnar 20 (declined influenza and COVID-19).     Supplements: Ok to continue supplements, last Vitamin D level normal but I recommended that she not increase her dose since it was at the high end of normal.     PLAN:                            Order sent for Litfulo 50 mg daily to [facility] (#[phone]). They will contact patient pending insurance coverage.   Minoxidil 2.5 mg half tablet (1.25 mg) daily sent to [facility]  Future Quantiferon TB Gold and Hep B serologies  Encouraged her to obtain influenza, COVID-19 and Prevnar 20 (declined influenza and COVID-19).     Follow-up: Kenny pending Litfulo coverage    SUBJECTIVE/OBJECTIVE:                          Lupe Deluna is a 53 year old female called for an initial visit. She was referred to me from Dr. Oliver.      Reason for visit: Discuss switch to Xeljanz to Litfulo.    Allergies/ADRs: Reviewed in chart  Past Medical History: Reviewed in chart  Tobacco: She  "reports that she has quit smoking. She has never used smokeless tobacco.  Alcohol: reviewed in chart    Medication Adherence/Access: Patient was familiar with her medications, but would like to be on the least amount of meds possible, therefore she has stopped medications in the past based on lab values.     Alopecia Areata/Universalis:   - Xeljanz 5 mg twice daily   Topicals on file -- she is not currently using but has at home if needed.   Currently her hair is filling in faster on Xeljanz but she is still \"patchy.\" Xeljanz more effective than Olumiant. Her eyelashes are coming in.   Was on minoxidil 2.5 mg half tablet but she stopped on her own to lessen the amount of meds she was taking, but she wonders if she can restart since she thinks it was helpful.   She is getting Xeljanz through PAP, but in 2024 the program will not allow her to get Xeljanz off label so she is open to Litfulo but would like to try it before 2024. She has Xeljanz at home and plans on refilling it again in Dec.   History of folliculitis on Olumiant.      Pre-Biologic Screening:   Hep B Surface Antibody  Hep B Core Antibody  Non-reactive (5/16/22)   Non-reactive (5/16/22)   Hep B Surface Antigen Non-reactive (5/16/22)    Hep C Antibody  Non-reactive (5/16/22)    HIV Antigen Antibody Non-reactive (5/16/22)    Quantiferon TB Gold Negative (5/16/22)    **Hep B 3-series completed 6/8/23    CBC - checked 8/7/23  CMP - checked 8/7/23, eGFR 74 mL/min    All patients on biologics should avoid live vaccines (varicella/VZV, intranasal influenza, MMR, or yellow fever vaccine (if traveling))      Immunization History   Administered Status   Covid-19 Booster Not done - patient declines   Influenza (annual, 4851-9213) Reports she is not sure if she will get this year. Last done 12/14/22 , avoid live FluMist   Pneumococcal  Prevnar-13: n/a  Pneumovax-23: n/a   Prevnar-20: n/a Due to receive     Tetanus/Tdap  Complete   Shingrix Complete   RSV (only for ? " 60 years old)  N/a     Supplements:   - Vitamin E 400 units daily   - Vitamin D 5000 units + K2  Last Vitamin D level = 70.3 on 11/422      Today's Vitals: There were no vitals taken for this visit.  ----------------      I spent 16 minutes with this patient today. All changes were made via collaborative practice agreement with Zainab Oliver. A copy of the visit note was provided to the patient's provider(s).    A summary of these recommendations was sent via [employer].    Zeynep Julio, Pharm.D., BCACP   Medication Therapy Management Pharmacist   [facility] Dermatology    Telemedicine Visit Details  Type of service:  Telephone visit  Start Time:  12:32 PM  End Time:  12:48 PM     Medication Therapy Recommendations  Alopecia areata    Current Medication: minoxidil (LONITEN) 2.5 MG tablet   Rationale: Synergistic therapy - Needs additional medication therapy - Indication   Status: Accepted per CPA          Current Medication: ritlecitinib tosylate (LITFULO) 50 MG CAPS   Rationale: Medication requires monitoring - Needs additional monitoring   Recommendation: Order Lab   Status: Accepted per CPA          Current Medication: ritlecitinib tosylate (LITFULO) 50 MG CAPS   Rationale: Preventive therapy - Needs additional medication therapy - Indication   Recommendation: Order Vaccine   Status: Accepted - no CPA Needed

## 2023-10-02 NOTE — TELEPHONE ENCOUNTER
PA Initiation    Medication: LITFULO 50 MG PO CAPS  Insurance Company: [employer] - Phone [phone] Fax [phone]  Pharmacy Filling the Rx: [facility] - [place], [place] - [facility] [address]  Filling Pharmacy Phone:    Filling Pharmacy Fax:    Start Date: 10/2/2023      Key: [id number]

## 2023-10-10 NOTE — TELEPHONE ENCOUNTER
Prior Authorization Approval    Medication: LITFULO 50 MG PO CAPS  Authorization Effective Date: 9/5/2023  Authorization Expiration Date: 10/5/2024  Approved Dose/Quantity: 30 per 30 days  Reference #: Key: [id number]   Insurance Company: [employer] - Phone [phone] Fax [phone]  Expected CoPay: $ 200  CoPay Card Available: Yes    Financial Assistance Needed: yes  Which Pharmacy is filling the prescription: [facility]  Pharmacy Notified: yes  Patient Notified: yes

## 2024-01-07 ENCOUNTER — HEALTH MAINTENANCE LETTER (OUTPATIENT)
Age: 54
End: 2024-01-07

## 2024-01-09 ENCOUNTER — OFFICE VISIT (OUTPATIENT)
Dept: DERMATOLOGY | Facility: CLINIC | Age: 54
End: 2024-01-09
Payer: COMMERCIAL

## 2024-01-09 VITALS — HEART RATE: 79 BPM | DIASTOLIC BLOOD PRESSURE: 92 MMHG | SYSTOLIC BLOOD PRESSURE: 141 MMHG

## 2024-01-09 DIAGNOSIS — L63.9 ALOPECIA AREATA: Primary | ICD-10-CM

## 2024-01-09 DIAGNOSIS — Z51.81 MEDICATION MONITORING ENCOUNTER: ICD-10-CM

## 2024-01-09 DIAGNOSIS — M35.9 AUTOIMMUNE DISEASE (H): ICD-10-CM

## 2024-01-09 LAB
ALBUMIN SERPL BCG-MCNC: 4.9 G/DL (ref 3.5–5.2)
ALP SERPL-CCNC: 69 U/L (ref 40–150)
ALT SERPL W P-5'-P-CCNC: 16 U/L (ref 0–50)
ANION GAP SERPL CALCULATED.3IONS-SCNC: 11 MMOL/L (ref 7–15)
AST SERPL W P-5'-P-CCNC: 23 U/L (ref 0–45)
BASOPHILS # BLD AUTO: 0.1 10E3/UL (ref 0–0.2)
BASOPHILS NFR BLD AUTO: 1 %
BILIRUB SERPL-MCNC: 0.3 MG/DL
BUN SERPL-MCNC: 19 MG/DL (ref 6–20)
CALCIUM SERPL-MCNC: 9.8 MG/DL (ref 8.6–10)
CHLORIDE SERPL-SCNC: 105 MMOL/L (ref 98–107)
CHOLEST SERPL-MCNC: 234 MG/DL
CREAT SERPL-MCNC: 0.84 MG/DL (ref 0.51–0.95)
DEPRECATED HCO3 PLAS-SCNC: 26 MMOL/L (ref 22–29)
EGFRCR SERPLBLD CKD-EPI 2021: 83 ML/MIN/1.73M2
EOSINOPHIL # BLD AUTO: 0.2 10E3/UL (ref 0–0.7)
EOSINOPHIL NFR BLD AUTO: 2 %
ERYTHROCYTE [DISTWIDTH] IN BLOOD BY AUTOMATED COUNT: 14.3 % (ref 10–15)
FASTING STATUS PATIENT QL REPORTED: NO
GLUCOSE SERPL-MCNC: 101 MG/DL (ref 70–99)
HCT VFR BLD AUTO: 41.2 % (ref 35–47)
HDLC SERPL-MCNC: 68 MG/DL
HGB BLD-MCNC: 13 G/DL (ref 11.7–15.7)
IMM GRANULOCYTES # BLD: 0 10E3/UL
IMM GRANULOCYTES NFR BLD: 0 %
LDLC SERPL CALC-MCNC: 143 MG/DL
LYMPHOCYTES # BLD AUTO: 1.6 10E3/UL (ref 0.8–5.3)
LYMPHOCYTES NFR BLD AUTO: 18 %
MCH RBC QN AUTO: 26 PG (ref 26.5–33)
MCHC RBC AUTO-ENTMCNC: 31.6 G/DL (ref 31.5–36.5)
MCV RBC AUTO: 82 FL (ref 78–100)
MONOCYTES # BLD AUTO: 0.7 10E3/UL (ref 0–1.3)
MONOCYTES NFR BLD AUTO: 8 %
NEUTROPHILS # BLD AUTO: 6.4 10E3/UL (ref 1.6–8.3)
NEUTROPHILS NFR BLD AUTO: 71 %
NONHDLC SERPL-MCNC: 166 MG/DL
NRBC # BLD AUTO: 0 10E3/UL
NRBC BLD AUTO-RTO: 0 /100
PLATELET # BLD AUTO: 326 10E3/UL (ref 150–450)
POTASSIUM SERPL-SCNC: 4.4 MMOL/L (ref 3.4–5.3)
PROT SERPL-MCNC: 7.5 G/DL (ref 6.4–8.3)
RBC # BLD AUTO: 5 10E6/UL (ref 3.8–5.2)
SODIUM SERPL-SCNC: 142 MMOL/L (ref 135–145)
TRIGL SERPL-MCNC: 113 MG/DL
WBC # BLD AUTO: 9 10E3/UL (ref 4–11)

## 2024-01-09 PROCEDURE — 86706 HEP B SURFACE ANTIBODY: CPT | Performed by: DERMATOLOGY

## 2024-01-09 PROCEDURE — 87340 HEPATITIS B SURFACE AG IA: CPT | Performed by: DERMATOLOGY

## 2024-01-09 PROCEDURE — 36415 COLL VENOUS BLD VENIPUNCTURE: CPT | Performed by: DERMATOLOGY

## 2024-01-09 PROCEDURE — 86481 TB AG RESPONSE T-CELL SUSP: CPT | Performed by: DERMATOLOGY

## 2024-01-09 PROCEDURE — 80061 LIPID PANEL: CPT | Performed by: DERMATOLOGY

## 2024-01-09 PROCEDURE — 99214 OFFICE O/P EST MOD 30 MIN: CPT | Performed by: DERMATOLOGY

## 2024-01-09 PROCEDURE — 80053 COMPREHEN METABOLIC PANEL: CPT | Performed by: DERMATOLOGY

## 2024-01-09 PROCEDURE — 86704 HEP B CORE ANTIBODY TOTAL: CPT | Performed by: DERMATOLOGY

## 2024-01-09 PROCEDURE — 85025 COMPLETE CBC W/AUTO DIFF WBC: CPT | Performed by: DERMATOLOGY

## 2024-01-09 RX ORDER — MINOXIDIL 2.5 MG/1
TABLET ORAL
Qty: 90 TABLET | Refills: 1 | Status: SHIPPED | OUTPATIENT
Start: 2024-01-09

## 2024-01-09 NOTE — LETTER
1/9/2024         RE: Lupe Deluna  [address] [address]        Dear Colleague,    Thank you for referring your patient, Lupe Deluna, to the [facility]. Please see a copy of my visit note below.    [facility] Dermatology Note  Encounter Date: Aug 7, 2023  Office Visit       Dermatology Problem List:  # Alopecia universalis,now with evidence of hair regrowth  - Biopsies: 9/24/20 (consistent with alopecia areata; full report in Media tab)  - SALT score: 01/09/2024 63%  - Prior Tx: baricitinib 4mg, tofacitinib 5mg BID  - Current tx: ritlecitinib 50mg, Minoxidil 2.5 mg              - Restarted 4/4/23              - Safety labs: 10/02/2023-Hepatitis Panel, Quantiferon    # Elevated BP readings unique to clinic visits  ____________________________________________     Assessment & Plan:  # Alopecia Areata/Universalis  Minimal improvement on baricitinib. Transition edback to tofacitinib 5mg BID as patient was having hair regrowth on regimen.   - started xeljanz, switched to olumiant which caused all gains to go away, switched back to xeljanz 04/04/23, switched to litfulo on 10/17/2023 as she was no longer able to get xeljanz due to qualification issues. She reports slight  growth.  - alternate 0.5 tablet of Minoxidil 2.5 mg with 1 tablet  - SALT Score:   Left 14%  Top 25%  Right 15%  Back 9%  Total 63%  -  Previous safety labs reviewed      Procedures Performed:   None    Follow-up: 5 month(s) in-person, or earlier for new or changing lesions    Scribe Disclosure:   By signing my name below, I, Jolene Whelan, attest that this documentation has been prepared under the direction and in the presence of Zainab Oliver MD.  - Electronically Signed: Jolene Whelan 01/09/24       Provider Disclosure:  I agree with above History, Review of Systems, Physical exam and Plan.  I have reviewed the content of the documentation and have  edited it as needed. I have personally performed the services documented here and the documentation accurately represents those services and the decisions I have made.      Electronically signed by:  Zainab Oliver MD  Professor and Chair  Department of Dermatology  [facility]    ____________________________________________    CC: Hair Loss (Patient feels like more shedding in the last few weeks.  Patient feels Xeljanz was possibly working better.  )       HPI:  Ms. Lupe Deluna is a 52 year old female who presents as a return patient for follow-up of hair loss, diagnosed as alopecia areata universaolis, now improving .   - Last seen on 08/07/2023  - Current tx: ritlecitinib 50mg, Minoxidil 2.5 mg (0.5 tablet)  - Overall course: now with evidence of vellus and indeterminate hair growth  - Scalp pain: no  - Scalp burning: no  - Scalp itching: seldom and did not last  - Eyebrow or eyelash changes since last visit: yes decreased  - Nail changes since last visit: growth and strength improvement  - No other concerns today and in general state of health.     Started xeljanz, switched to olumiant which caused all gains to go away, switched back to xeljanz 04/04/23, switched to litfulo on 10/17/2023 as she was no longer able to get xeljanz due to qualification issues. She reports slight sideburn growth.    Patient is otherwise feeling well, in usual state of health, and has no additional skin concerns today.     Labs:  10/02/2023-Hepatitis Panel, Quantiferon    Physical Exam:  Vitals: BP (!) 141/92   Pulse 79   GEN: Well developed, well-nourished, in no acute distress, in a pleasant mood.    SKIN: Focused examination of scalp, face and hands was performed.  SCALP -  - no diffuse erythema   - no perifollicular erythema  - no perifollicular scale   - no scaling of the scalp   - no scalp folliculitis/pustules   - Eyebrows: 85% loss, some scattered  - Eyelashes: extensive loss  - vellus fibers on scalp  -  In comparison to prior photographs, more hair presence currently    - No other lesions of concern on areas examined.                               Current Outpatient Medications   Medication     benzoyl peroxide 5 % external liquid     bimatoprost (LATISSE) 0.03 % external opthalmic solution     clindamycin (CLEOCIN T) 1 % external solution     cyclobenzaprine (FLEXERIL) 10 MG tablet     Estradiol-Estriol-Progesterone (BIEST/PROGESTERONE) CREA     ketoconazole (NIZORAL) 2 % external shampoo     levothyroxine (SYNTHROID/LEVOTHROID) 100 MCG tablet     liothyronine (CYTOMEL) 5 MCG tablet     minoxidil (LONITEN) 2.5 MG tablet     NONFORMULARY     progesterone (PROMETRIUM) 100 MG capsule     tofacitinib (XELJANZ) 5 MG tablet     tofacitinib (XELJANZ) 5 MG tablet     zolpidem (AMBIEN) 10 MG tablet     No current facility-administered medications for this visit.      Past Medical History:   Patient Active Problem List   Diagnosis     Fatigue     No past medical history on file.    CC No referring provider defined for this encounter. on close of this encounter.      Again, thank you for allowing me to participate in the care of your patient.        Sincerely,        Zainab Oliver MD

## 2024-01-09 NOTE — NURSING NOTE
Lupe Deluna's goals for this visit include:   Chief Complaint   Patient presents with    Hair Loss     Patient feels like more shedding in the last few weeks.  Patient feels Xemario was possibly working better.        She requests these members of her care team be copied on today's visit information:     PCP: No Ref-Primary, Physician    Referring Provider:  No referring provider defined for this encounter.    BP (!) 141/92   Pulse 79     Do you need any medication refills at today's visit?     Cheyenne Judge on 1/9/2024 at 1:08 PM

## 2024-01-09 NOTE — PROGRESS NOTES
[facility] Dermatology Note  Encounter Date: Aug 7, 2023  Office Visit       Dermatology Problem List:  # Alopecia universalis,now with evidence of hair regrowth  - Biopsies: 9/24/20 (consistent with alopecia areata; full report in Media tab)  - SALT score: 01/09/2024 63%  - Prior Tx: baricitinib 4mg, tofacitinib 5mg BID  - Current tx: ritlecitinib 50mg, Minoxidil 2.5 mg              - Restarted 4/4/23              - Safety labs: 10/02/2023-Hepatitis Panel, Quantiferon    # Elevated BP readings unique to clinic visits  ____________________________________________     Assessment & Plan:  # Alopecia Areata/Universalis  Minimal improvement on baricitinib. Transition edback to tofacitinib 5mg BID as patient was having hair regrowth on regimen.   - started xeljanz, switched to olumiant which caused all gains to go away, switched back to xeljanz 04/04/23, switched to litfulo on 10/17/2023 as she was no longer able to get xeljanz due to qualification issues. She reports slight  growth.  - alternate 0.5 tablet of Minoxidil 2.5 mg with 1 tablet  - SALT Score:   Left 14%  Top 25%  Right 15%  Back 9%  Total 63%  -  Previous safety labs reviewed      Procedures Performed:   None    Follow-up: 5 month(s) in-person, or earlier for new or changing lesions    Scribe Disclosure:   By signing my name below, I, Jolene Whelan, attest that this documentation has been prepared under the direction and in the presence of Zainab Oliver MD.  - Electronically Signed: Jolene Whelan 01/09/24       Provider Disclosure:  I agree with above History, Review of Systems, Physical exam and Plan.  I have reviewed the content of the documentation and have edited it as needed. I have personally performed the services documented here and the documentation accurately represents those services and the decisions I have made.      Electronically signed by:  Zainab Oliver MD  Professor and Chair  Department of  Dermatology  [facility]    ____________________________________________    CC: Hair Loss (Patient feels like more shedding in the last few weeks.  Patient feels Xeljanz was possibly working better.  )       HPI:  Ms. Lupe Deluna is a 52 year old female who presents as a return patient for follow-up of hair loss, diagnosed as alopecia areata universaolis, now improving .   - Last seen on 08/07/2023  - Current tx: ritlecitinib 50mg, Minoxidil 2.5 mg (0.5 tablet)  - Overall course: now with evidence of vellus and indeterminate hair growth  - Scalp pain: no  - Scalp burning: no  - Scalp itching: seldom and did not last  - Eyebrow or eyelash changes since last visit: yes decreased  - Nail changes since last visit: growth and strength improvement  - No other concerns today and in general state of health.     Started xeljanz, switched to olumiant which caused all gains to go away, switched back to xeljanz 04/04/23, switched to litfulo on 10/17/2023 as she was no longer able to get xeljanz due to qualification issues. She reports slight sideburn growth.    Patient is otherwise feeling well, in usual state of health, and has no additional skin concerns today.     Labs:  10/02/2023-Hepatitis Panel, Quantiferon    Physical Exam:  Vitals: BP (!) 141/92   Pulse 79   GEN: Well developed, well-nourished, in no acute distress, in a pleasant mood.    SKIN: Focused examination of scalp, face and hands was performed.  SCALP -  - no diffuse erythema   - no perifollicular erythema  - no perifollicular scale   - no scaling of the scalp   - no scalp folliculitis/pustules   - Eyebrows: 85% loss, some scattered  - Eyelashes: extensive loss  - vellus fibers on scalp  - In comparison to prior photographs, more hair presence currently    - No other lesions of concern on areas examined.                               Current Outpatient Medications   Medication    benzoyl peroxide 5 % external liquid    bimatoprost (LATISSE)  0.03 % external opthalmic solution    clindamycin (CLEOCIN T) 1 % external solution    cyclobenzaprine (FLEXERIL) 10 MG tablet    Estradiol-Estriol-Progesterone (BIEST/PROGESTERONE) CREA    ketoconazole (NIZORAL) 2 % external shampoo    levothyroxine (SYNTHROID/LEVOTHROID) 100 MCG tablet    liothyronine (CYTOMEL) 5 MCG tablet    minoxidil (LONITEN) 2.5 MG tablet    NONFORMULARY    progesterone (PROMETRIUM) 100 MG capsule    tofacitinib (XELJANZ) 5 MG tablet    tofacitinib (XELJANZ) 5 MG tablet    zolpidem (AMBIEN) 10 MG tablet     No current facility-administered medications for this visit.      Past Medical History:   Patient Active Problem List   Diagnosis    Fatigue     No past medical history on file.    CC No referring provider defined for this encounter. on close of this encounter.

## 2024-01-10 LAB
HBV CORE AB SERPL QL IA: NONREACTIVE
HBV SURFACE AB SERPL IA-ACNC: 453 M[IU]/ML
HBV SURFACE AB SERPL IA-ACNC: REACTIVE M[IU]/ML
HBV SURFACE AG SERPL QL IA: NONREACTIVE

## 2024-01-11 LAB
GAMMA INTERFERON BACKGROUND BLD IA-ACNC: 0.05 IU/ML
M TB IFN-G BLD-IMP: NEGATIVE
M TB IFN-G CD4+ BCKGRND COR BLD-ACNC: 9.95 IU/ML
MITOGEN IGNF BCKGRD COR BLD-ACNC: 0 IU/ML
MITOGEN IGNF BCKGRD COR BLD-ACNC: 0.01 IU/ML
QUANTIFERON MITOGEN: 10 IU/ML
QUANTIFERON NIL TUBE: 0.05 IU/ML
QUANTIFERON TB1 TUBE: 0.05 IU/ML
QUANTIFERON TB2 TUBE: 0.06

## 2024-01-15 ENCOUNTER — MYC MEDICAL ADVICE (OUTPATIENT)
Dept: DERMATOLOGY | Facility: CLINIC | Age: 54
End: 2024-01-15
Payer: COMMERCIAL

## 2024-01-16 ENCOUNTER — TELEPHONE (OUTPATIENT)
Dept: DERMATOLOGY | Facility: CLINIC | Age: 54
End: 2024-01-16
Payer: COMMERCIAL

## 2024-01-16 DIAGNOSIS — L63.9 ALOPECIA AREATA: ICD-10-CM

## 2024-01-16 NOTE — TELEPHONE ENCOUNTER
"Pt read [facility] message and will call the clinic with any questions or concerns.      Elyse Zacarias RN on 1/16/2024 at 8:41 AM        Lipid panel reflex to direct LDL Fasting  Order: [id number]  Status: Final result       Visible to patient: Yes (seen)       Dx: Alopecia areata; Medication monitorin...    2 Result Notes       1 Patient Communication       1  Topic          Component  Ref Range & Units 7 d ago  (1/9/24) 5 mo ago  (8/7/23) 9 mo ago  (3/24/23) 1 yr ago  (11/14/22) 1 yr ago  (8/26/22) 1 yr ago  (5/16/22)  Cholesterol  <200 mg/dL 234 High  224 High  207 High  190 187 164  Triglycerides  <150 mg/dL 113 221 High  104 144 254 High  129  Direct Measure HDL  >=50 mg/dL 68 78 73 76 68 62  LDL Cholesterol Calculated  <=100 mg/dL 143 High  102 High  113 High  85 68 76  Non HDL Cholesterol  <130 mg/dL 166 High  146 High  134 High  114 119 102  Patient Fasting > 8hrs? No  Yes  No Yes  Resulting Agency [facility]         Result Notes     Leanne Forbes RN  1/15/2024  9:06 AM CST Back to Top    Will hold to ensure patient reviews result.  LILLIAM Burton MD  1/13/2024  8:57 PM CST     Please let Lupe know her cholesterol is up just slightly as is her LDL and non HDL cholesterol. The good cholesterol, HDL is excellent.  Next time these are done recommend that Lupe be fasting.     Also, the Hepatitis B Surface Antibody was Reactive. This means Either recovery from acute or chronic hepatitis B virus (HBV) infection or acquired immunity from HBV vaccination.  Please ask Lupe if she was vaccinated.     Please also ask her to share these results with her primary care provider.     Questions, let me know in clinic Tuesday - thanks       Patient Communication     Append Comments   Seen Back to Top     Hi Dr. Benito Andrade commented on your lab results and stated, \"Please let " Lupe know her cholesterol is up just slightly as is her LDL and non HDL cholesterol. The good cholesterol, HDL is excellent.  Next ...  Written by Leanne Forbes RN on 1/15/2024  9:06 AM CST View Full Comments  Seen by patient Lupe Deluna on 1/15/2024  5:26 PM

## 2024-01-16 NOTE — TELEPHONE ENCOUNTER
ritlecitinib tosylate (LITFULO) 50 MG CAPS   Last Written Prescription Date:  10/2/2023  Last Fill Quantity: 30,   # refills: 3  Last Office Visit : 1/9/2024  Future Office visit:  6/3/2024    Routing refill request to provider for review/approval because:  Drug not on the [facility], [facility] or [facility] refill protocol or controlled substance    Maritza Zafar RN  Central Triage Red Flags/Med Refills

## 2024-01-20 RX ORDER — RITLECITINIB 50 MG/1
50 CAPSULE ORAL DAILY
Qty: 60 CAPSULE | Refills: 1 | Status: SHIPPED | OUTPATIENT
Start: 2024-01-20 | End: 2024-05-10

## 2024-05-10 DIAGNOSIS — L63.9 ALOPECIA AREATA: ICD-10-CM

## 2024-05-10 NOTE — TELEPHONE ENCOUNTER
Refill request for Litfulo 50MG from [facility] [address] in [place], [place].  Tel: [phone]    Guy Rodriguez on 5/10/2024 at 1:39 PM

## 2024-05-10 NOTE — TELEPHONE ENCOUNTER
ritlecitinib tosylate (LITFULO) 50 MG CAPS   Disp-60 capsule, R-1   Start: 01/20/2024 1/9/2024  [facility] Zainab Kirby MD  Dermatology     Routed because:  request per pt call.not on protocol

## 2024-05-11 RX ORDER — RITLECITINIB 50 MG/1
50 CAPSULE ORAL DAILY
Qty: 60 CAPSULE | Refills: 1 | Status: SHIPPED | OUTPATIENT
Start: 2024-05-11 | End: 2024-06-14

## 2024-06-03 ENCOUNTER — OFFICE VISIT (OUTPATIENT)
Dept: DERMATOLOGY | Facility: CLINIC | Age: 54
End: 2024-06-03
Payer: COMMERCIAL

## 2024-06-03 VITALS — HEART RATE: 56 BPM | DIASTOLIC BLOOD PRESSURE: 80 MMHG | SYSTOLIC BLOOD PRESSURE: 130 MMHG

## 2024-06-03 DIAGNOSIS — Z51.81 MEDICATION MONITORING ENCOUNTER: ICD-10-CM

## 2024-06-03 DIAGNOSIS — M35.9 AUTOIMMUNE DISEASE (H): ICD-10-CM

## 2024-06-03 DIAGNOSIS — L63.9 ALOPECIA AREATA: Primary | ICD-10-CM

## 2024-06-03 DIAGNOSIS — R89.9 ABNORMAL LABORATORY TEST RESULT: ICD-10-CM

## 2024-06-03 DIAGNOSIS — M79.89 RIGHT LEG SWELLING: ICD-10-CM

## 2024-06-03 PROCEDURE — 99214 OFFICE O/P EST MOD 30 MIN: CPT | Mod: GC | Performed by: DERMATOLOGY

## 2024-06-03 ASSESSMENT — PAIN SCALES - GENERAL: PAINLEVEL: NO PAIN (0)

## 2024-06-03 NOTE — PROGRESS NOTES
[facility] Dermatology Note  Encounter Date: Favio 3, 2024  Office Visit     Dermatology Problem List:  # Alopecia universalis,now with evidence of hair regrowth  - Biopsies: 9/24/20 (consistent with alopecia areata; full report in Media tab)  - SALT score: 06/03/24: 95%  - Prior Tx: baricitinib 4mg (lost all hair), tofacitinib 5mg BID, ritlecitinib 50mg daily (lost all hair)  - Current tx: Minoxidil 2.5 mg, alternating with 1.25 mg every day              - Restarted 4/4/23              - Safety labs: 10/02/2023-Hepatitis Panel, Quantiferon     # Elevated BP readings unique to clinic visits  ____________________________________________    Assessment & Plan:     # Alopecia Areata/Universalis  Chronic, active, flaring today with loss of majority of scalp hair after transitioning from tofacitinib to ritlecitinib in October 2023.  Previously, patient had had a good response on oral tofacitinib, but due lack of insurance coverage had to switch to baricitinib and subsequently lost hair.  Had improvement after restarting tofacitinib for short period of time, but then after transitioning to ritlicetinib in October 2023, has had more hair loss.  Given concomitant intermittent swelling of the right lower extremity (see below) and lack of efficacy of ritlecitinib, will stop all oral hair- oriented systemic medications at this time and await for ultrasound results to rule out DVT, as patient has PCP appointment on Friday.  If no DVT, will restart oral minoxidil and connect with MTM team regarding restarting tofacitinib versus upadacitinib, prioritizing Dayo 1 inhibition.  Plan as follows:  - temporarily stop minoxidil. If no DVT, restart alternating 0.5 tablet of Minoxidil 2.5 mg with 1 tablet daily  - Stop ritlecitinib given lack of efficacy and right lower extremity swelling.  - If no DVT, obtain MTM assistance with restarting tofacitinib versus upadacitinib  - Plan for phone follow-up on Tuesday, June 11th  to discuss results and plan  - SALT Score: 95%  Left 18%  Top 40%  Right 18%  Back 19 %  Total 95%  -  Previous safety labs reviewed  - Labs ordered: CBC, CMP, Vit D, lipid panel, vitamin D (given that patient has been taking this supplement), CRP (per patient request)    # Intermittent right lower extremity swelling  Although no swelling is observed today, patient provides photograph of swelling of the right lower extremity (see below).  She wonders if this may be to standing on her feet for a long time.  However, out of an abundance of caution given that patient is on a Dayo inhibitor with known adverse effects of thrombosis, will stop ritlecitinib and oral minoxidil and await ultrasound results from patient's upcoming appointment on Friday, June 7th.      Procedures Performed:   - none    Follow-up: Next Tuesday, 6/11 via phone visit to discuss treatment options following DVT rule out    Staff and Resident:     Darwin Gonsalez MD PGY-3  Dermatology Resident     Patient was seen and examined with the dermatology resident. I agree with the history, review of systems, physical examination, assessments and plan.    Zainab Oliver MD  Professor and  Chair  Department of Dermatology  [facility]   ____________________________________________    CC: Hair Loss (Lost all hair that she had, medication isn't working)    HPI:  Ms. Lupe Deluna is a 53 year old female who presents as a return patient for follow-up of hair loss, diagnosed as alopecia universalis.   - Last seen Jan 2024 in-clinic  - Patient states that since then on the ritlecitinib she has lost the majority of her scalp and other body hair.  -She is also noticed intermittent, occasionally painful swelling of the right lower extremity.  Is wondering if this is related to her minoxidil.  No history of DVT or stroke.  - Shedding or thinning, or both: both  - Current tx: ritlecitinib, PO minoxidil  - If using Rogaine, 1 cannister lasts how  long: n/a   - Scalp or hair care habits/products: Rosemary tincture   no Any new medications, supplements, or products? (please list below)     no Scalp pain   no Scalp burning   no Scalp itching    yes Eyebrow changes    yes Eyelash changes   N/a Beard changes    none Other body hair changes    Growing, still pitting with longitudinal liens Nail changes    No Additional symptoms? (please list below)     - Overall course: Waxing and waning, now with majority of scalp and other body hair loss  - COVID status: infected Oct 2019, lost hair shortly after that.     Patient is otherwise feeling well, in usual state of health, and has no additional skin concerns today.     Labs:    Labs 1/9/2024:    CBC within normal limits  CMP with slightly elevated glucose, otherwise within normal limits  Hepatitis B serology within normal limits, immune  Chronic urine gold negative  Cholesterol 234, triglycerides 113, HDL 68    Labs 2/16/24  TSH 1.90  Free T4 1.47      Physical Exam:  Vitals: /80   Pulse 56   GEN: Well developed, well-nourished, in no acute distress, in a pleasant mood.    SKIN: Focused examination of the scalp, face, nails was performed.  - Latif type: II  - a patch of fine terminal hairs clustered on the central vertex scalp with a few small vellus hairs seen with tangential lighting on the remainder of the scalp and eyebrows  - Eyelashes are not present  - Subtle vellus and a few terminal hairs present on the bilateral dorsal forearms and legs  - subtle pitting, longitudinal striations present on the nails  - In comparison to prior photographs, patient has lost more scalp hair  - No other lesions of concern on areas examined.                                     Medications:  Current Outpatient Medications   Medication Sig Dispense Refill    benzoyl peroxide 5 % external liquid Use daily as directed 226 g 11    bimatoprost (LATISSE) 0.03 % external opthalmic solution Apply 1 drop topically At Bedtime 5  mL 11    clindamycin (CLEOCIN T) 1 % external solution Apply topically 2 times daily as needed (scalp bumps) Use with benzoyl peroxide 60 mL 11    minoxidil (LONITEN) 2.5 MG tablet Take half tablet (1.25 mg) by mouth every other day alternating with one tablet daily 90 tablet 1    NALTREXONE HCL PO Take 4.5 mg by mouth daily      NONFORMULARY Please provide 1 scalp prosthesis for alopecia areata 1 each 1    ritlecitinib tosylate (LITFULO) 50 MG CAPS Take 50 mg by mouth daily 60 capsule 1    vitamin D3 (CHOLECALCIFEROL) 125 MCG (5000 UT) tablet Take 5,000 Units by mouth      vitamin E 400 units TABS Take 400 Units by mouth daily      ketoconazole (NIZORAL) 2 % external shampoo Apply topically daily as needed for itching or irritation (Patient not taking: Reported on 8/7/2023) 120 mL 11     No current facility-administered medications for this visit.      Past Medical History:   Patient Active Problem List   Diagnosis    Fatigue     No past medical history on file.

## 2024-06-03 NOTE — PATIENT INSTRUCTIONS
Please stop ritlecitinib. Please also stop oral minoxidil until you get the testing for your leg swelling.

## 2024-06-03 NOTE — LETTER
6/3/2024       RE: Lupe Deluna  [address] [facility] [address] [facility] [id number]     Dear Colleague,    Thank you for referring your patient, Lupe Deluna, to the [facility] [place] at [facility]. Please see a copy of my visit note below.    [facility] Dermatology Note  Encounter Date: Favio 3, 2024  Office Visit     Dermatology Problem List:  # Alopecia universalis,now with evidence of hair regrowth  - Biopsies: 9/24/20 (consistent with alopecia areata; full report in Media tab)  - SALT score: 06/03/24: 95%  - Prior Tx: baricitinib 4mg (lost all hair), tofacitinib 5mg BID, ritlecitinib 50mg daily (lost all hair)  - Current tx: Minoxidil 2.5 mg, alternating with 1.25 mg every day              - Restarted 4/4/23              - Safety labs: 10/02/2023-Hepatitis Panel, Quantiferon     # Elevated BP readings unique to clinic visits  ____________________________________________    Assessment & Plan:     # Alopecia Areata/Universalis  Chronic, active, flaring today with loss of majority of scalp hair after transitioning from tofacitinib to ritlecitinib in October 2023.  Previously, patient had had a good response on oral tofacitinib, but due lack of insurance coverage had to switch to baricitinib and subsequently lost hair.  Had improvement after restarting tofacitinib for short period of time, but then after transitioning to ritlicetinib in October 2023, has had more hair loss.  Given concomitant intermittent swelling of the right lower extremity (see below) and lack of efficacy of ritlecitinib, will stop all oral hair- oriented systemic medications at this time and await for ultrasound results to rule out DVT, as patient has PCP appointment on Friday.  If no DVT, will restart oral minoxidil and connect with MTM team regarding restarting tofacitinib versus upadacitinib, prioritizing Dayo 1 inhibition.  Plan as  follows:  - temporarily stop minoxidil. If no DVT, restart alternating 0.5 tablet of Minoxidil 2.5 mg with 1 tablet daily  - Stop ritlecitinib given lack of efficacy and right lower extremity swelling.  - If no DVT, obtain MTM assistance with restarting tofacitinib versus upadacitinib  - Plan for phone follow-up on Tuesday, June 11th to discuss results and plan  - SALT Score: 95%  Left 18%  Top 40%  Right 18%  Back 19 %  Total 95%  -  Previous safety labs reviewed  - Labs ordered: CBC, CMP, Vit D, lipid panel, vitamin D (given that patient has been taking this supplement), CRP (per patient request)    # Intermittent right lower extremity swelling  Although no swelling is observed today, patient provides photograph of swelling of the right lower extremity (see below).  She wonders if this may be to standing on her feet for a long time.  However, out of an abundance of caution given that patient is on a Dayo inhibitor with known adverse effects of thrombosis, will stop ritlecitinib and oral minoxidil and await ultrasound results from patient's upcoming appointment on Friday, June 7th.      Procedures Performed:   - none    Follow-up: Next Tuesday, 6/11 via phone visit to discuss treatment options following DVT rule out    Staff and Resident:     Darwin Gonsalez MD PGY-3  Dermatology Resident     Patient was seen and examined with the dermatology resident. I agree with the history, review of systems, physical examination, assessments and plan.    Zainab Oliver MD  Professor and  Chair  Department of Dermatology  [facility]   ____________________________________________    CC: Hair Loss (Lost all hair that she had, medication isn't working)    HPI:  Ms. Lupe Deluna is a 53 year old female who presents as a return patient for follow-up of hair loss, diagnosed as alopecia universalis.   - Last seen Jan 2024 in-clinic  - Patient states that since then on the ritlecitinib she has lost the majority  of her scalp and other body hair.  -She is also noticed intermittent, occasionally painful swelling of the right lower extremity.  Is wondering if this is related to her minoxidil.  No history of DVT or stroke.  - Shedding or thinning, or both: both  - Current tx: ritlecitinib, PO minoxidil  - If using Rogaine, 1 cannister lasts how long: n/a   - Scalp or hair care habits/products: Rosemary tincture   no Any new medications, supplements, or products? (please list below)     no Scalp pain   no Scalp burning   no Scalp itching    yes Eyebrow changes    yes Eyelash changes   N/a Beard changes    none Other body hair changes    Growing, still pitting with longitudinal liens Nail changes    No Additional symptoms? (please list below)     - Overall course: Waxing and waning, now with majority of scalp and other body hair loss  - COVID status: infected Oct 2019, lost hair shortly after that.     Patient is otherwise feeling well, in usual state of health, and has no additional skin concerns today.     Labs:    Labs 1/9/2024:    CBC within normal limits  CMP with slightly elevated glucose, otherwise within normal limits  Hepatitis B serology within normal limits, immune  Chronic urine gold negative  Cholesterol 234, triglycerides 113, HDL 68    Labs 2/16/24  TSH 1.90  Free T4 1.47      Physical Exam:  Vitals: /80   Pulse 56   GEN: Well developed, well-nourished, in no acute distress, in a pleasant mood.    SKIN: Focused examination of the scalp, face, nails was performed.  - Latif type: II  - a patch of fine terminal hairs clustered on the central vertex scalp with a few small vellus hairs seen with tangential lighting on the remainder of the scalp and eyebrows  - Eyelashes are not present  - Subtle vellus and a few terminal hairs present on the bilateral dorsal forearms and legs  - subtle pitting, longitudinal striations present on the nails  - In comparison to prior photographs, patient has lost more scalp  hair  - No other lesions of concern on areas examined.                                     Medications:  Current Outpatient Medications   Medication Sig Dispense Refill     benzoyl peroxide 5 % external liquid Use daily as directed 226 g 11     bimatoprost (LATISSE) 0.03 % external opthalmic solution Apply 1 drop topically At Bedtime 5 mL 11     clindamycin (CLEOCIN T) 1 % external solution Apply topically 2 times daily as needed (scalp bumps) Use with benzoyl peroxide 60 mL 11     minoxidil (LONITEN) 2.5 MG tablet Take half tablet (1.25 mg) by mouth every other day alternating with one tablet daily 90 tablet 1     NALTREXONE HCL PO Take 4.5 mg by mouth daily       NONFORMULARY Please provide 1 scalp prosthesis for alopecia areata 1 each 1     ritlecitinib tosylate (LITFULO) 50 MG CAPS Take 50 mg by mouth daily 60 capsule 1     vitamin D3 (CHOLECALCIFEROL) 125 MCG (5000 UT) tablet Take 5,000 Units by mouth       vitamin E 400 units TABS Take 400 Units by mouth daily       ketoconazole (NIZORAL) 2 % external shampoo Apply topically daily as needed for itching or irritation (Patient not taking: Reported on 8/7/2023) 120 mL 11     No current facility-administered medications for this visit.      Past Medical History:   Patient Active Problem List   Diagnosis     Fatigue     No past medical history on file.      Again, thank you for allowing me to participate in the care of your patient.      Sincerely,    Zainab Oliver MD

## 2024-06-03 NOTE — NURSING NOTE
Dermatology Rooming Note    Lupe Deluna's goals for this visit include:   Chief Complaint   Patient presents with    Hair Loss     Lost all hair that she had, medication isn't working     Deja Farrar LPN

## 2024-06-04 ENCOUNTER — LAB (OUTPATIENT)
Dept: LAB | Facility: CLINIC | Age: 54
End: 2024-06-04
Payer: COMMERCIAL

## 2024-06-04 DIAGNOSIS — L63.9 ALOPECIA AREATA: ICD-10-CM

## 2024-06-04 LAB
BASOPHILS # BLD AUTO: 0 10E3/UL (ref 0–0.2)
BASOPHILS NFR BLD AUTO: 0 %
EOSINOPHIL # BLD AUTO: 0.2 10E3/UL (ref 0–0.7)
EOSINOPHIL NFR BLD AUTO: 2 %
ERYTHROCYTE [DISTWIDTH] IN BLOOD BY AUTOMATED COUNT: 15.2 % (ref 10–15)
HCT VFR BLD AUTO: 42.3 % (ref 35–47)
HGB BLD-MCNC: 13.3 G/DL (ref 11.7–15.7)
IMM GRANULOCYTES # BLD: 0 10E3/UL
IMM GRANULOCYTES NFR BLD: 0 %
LYMPHOCYTES # BLD AUTO: 1.3 10E3/UL (ref 0.8–5.3)
LYMPHOCYTES NFR BLD AUTO: 17 %
MCH RBC QN AUTO: 25.6 PG (ref 26.5–33)
MCHC RBC AUTO-ENTMCNC: 31.4 G/DL (ref 31.5–36.5)
MCV RBC AUTO: 81 FL (ref 78–100)
MONOCYTES # BLD AUTO: 0.6 10E3/UL (ref 0–1.3)
MONOCYTES NFR BLD AUTO: 8 %
NEUTROPHILS # BLD AUTO: 5.4 10E3/UL (ref 1.6–8.3)
NEUTROPHILS NFR BLD AUTO: 72 %
PLATELET # BLD AUTO: 311 10E3/UL (ref 150–450)
RBC # BLD AUTO: 5.2 10E6/UL (ref 3.8–5.2)
WBC # BLD AUTO: 7.5 10E3/UL (ref 4–11)

## 2024-06-04 PROCEDURE — 84446 ASSAY OF VITAMIN E: CPT | Mod: 90

## 2024-06-04 PROCEDURE — 85025 COMPLETE CBC W/AUTO DIFF WBC: CPT

## 2024-06-04 PROCEDURE — 99000 SPECIMEN HANDLING OFFICE-LAB: CPT

## 2024-06-04 PROCEDURE — 36415 COLL VENOUS BLD VENIPUNCTURE: CPT

## 2024-06-04 PROCEDURE — 86140 C-REACTIVE PROTEIN: CPT

## 2024-06-04 PROCEDURE — 80053 COMPREHEN METABOLIC PANEL: CPT

## 2024-06-04 PROCEDURE — 80061 LIPID PANEL: CPT

## 2024-06-04 PROCEDURE — 82306 VITAMIN D 25 HYDROXY: CPT

## 2024-06-05 LAB
ALBUMIN SERPL BCG-MCNC: 4.8 G/DL (ref 3.5–5.2)
ALP SERPL-CCNC: 77 U/L (ref 40–150)
ALT SERPL W P-5'-P-CCNC: 31 U/L (ref 0–50)
ANION GAP SERPL CALCULATED.3IONS-SCNC: 12 MMOL/L (ref 7–15)
AST SERPL W P-5'-P-CCNC: 21 U/L (ref 0–45)
BILIRUB SERPL-MCNC: 1 MG/DL
BUN SERPL-MCNC: 12.4 MG/DL (ref 6–20)
CALCIUM SERPL-MCNC: 9.9 MG/DL (ref 8.6–10)
CHLORIDE SERPL-SCNC: 105 MMOL/L (ref 98–107)
CHOLEST SERPL-MCNC: 234 MG/DL
CREAT SERPL-MCNC: 0.64 MG/DL (ref 0.51–0.95)
CRP SERPL-MCNC: 3.5 MG/L
DEPRECATED HCO3 PLAS-SCNC: 26 MMOL/L (ref 22–29)
EGFRCR SERPLBLD CKD-EPI 2021: >90 ML/MIN/1.73M2
FASTING STATUS PATIENT QL REPORTED: YES
FASTING STATUS PATIENT QL REPORTED: YES
GLUCOSE SERPL-MCNC: 91 MG/DL (ref 70–99)
HDLC SERPL-MCNC: 76 MG/DL
LDLC SERPL CALC-MCNC: 135 MG/DL
NONHDLC SERPL-MCNC: 158 MG/DL
POTASSIUM SERPL-SCNC: 4.8 MMOL/L (ref 3.4–5.3)
PROT SERPL-MCNC: 6.9 G/DL (ref 6.4–8.3)
SODIUM SERPL-SCNC: 143 MMOL/L (ref 135–145)
TRIGL SERPL-MCNC: 116 MG/DL
VIT D+METAB SERPL-MCNC: 94 NG/ML (ref 20–50)

## 2024-06-06 LAB
A-TOCOPHEROL VIT E SERPL-MCNC: 28.2 MG/L
BETA+GAMMA TOCOPHEROL SERPL-MCNC: 0.5 MG/L

## 2024-06-10 NOTE — PROGRESS NOTES
[facility] Dermatology Note  Encounter Date: Jun 11, 2024  Telephone ). Location of teledermatologist: [facility]. Start time: 2:23p. End time: 2:30p.      Dermatology Problem List:  1. # Alopecia universalis,now with evidence of hair regrowth  - Biopsies: 9/24/20 (consistent with alopecia areata; full report in Media tab)  - SALT score: 06/03/24: 95%  - Prior Tx: baricitinib 4mg (lost all hair), tofacitinib 5mg BID, ritlecitinib 50mg daily (lost all hair)  - Current tx: Minoxidil 2.5 mg, alternating with 1.25 mg every day              - Restarted 4/4/23              - Safety labs: 10/02/2023-Hepatitis Panel, Quantiferon                # Elevated BP readings unique to clinic visits    #Elevated Vitamin E level - asked to discontinue supplement, repeat in the fall    # slightly abnormal lipids, stable  - asked patient to also review with her primary care provider    ____________________________________________    Assessment & Plan:     # Alopecia Areata/Universalis  Chronic, active, flaring today with loss of majority of scalp hair after transitioning from tofacitinib to ritlecitinib in October 2023.  Previously, patient had had a good response on oral tofacitinib, but due lack of insurance coverage had to switch to baricitinib and subsequently lost hair.  Had improvement after restarting tofacitinib for short period of time, but then after transitioning to ritlicetinib in October 2023, has had more hair loss.  Given concomitant intermittent swelling of the right lower extremity (see below) and lack of efficacy of ritlecitinib, will stop all oral hair- oriented systemic medications at this time and await for ultrasound results to rule out DVT, as patient has PCP appointment on Friday.  - restart alternating 0.5 tablet of Minoxidil 2.5 mg with 1 tablet daily  - Start upadacitinib daily  - Discontinue vitamin d for now (Last lab: 6/4/24-(94)  - Repeat viatmin d and vitamin  E in september  - Labs ordered:vitamin D and Vitamin E     # Intermittent right lower extremity swelling - no evidence of DVT on ultrasound  Although no swelling is observed today, patient provided photograph of swelling of the right lower extremity at her last visit.  She wonders if this may be to standing on her feet for a long time.  However, out of an abundance of caution given that patient is on a Dayo inhibitor with known adverse effects of thrombosis, willrititinib and oral minoxidil and await ultrasound results from patient's upcoming appointment on Friday, June 7th.      # Elevated Vitamin D and Vitamin E  - Discontinue vitamin d  - repeat labs in September    Procedures Performed:    None    Follow-up: 5 month(s) in-person, or earlier for new or changing lesions    Staff and Scribe:     Scribe Disclosure:   I, Corinne Chirinos, am serving as a scribe; to document services personally performed by Zainab Oliver MD -based on data collection and the provider's statements to me.     Provider Disclosure:  I agree with above History, Review of Systems, Physical exam and Plan.  I have reviewed the content of the documentation and have edited it as needed. I have personally performed the services documented here and the documentation accurately represents those services and the decisions I have made.      Electronically signed by:  Zainab Oliver MD  Professor   Department of Dermatology  [facility]     ____________________________________________    CC: Hair Loss (Hair loss  follow up, after some swelling in the legs )    HPI:  Ms. Lupe Deluna is a(n) 53 year old female who presents today for follow-up  of her alopecia universalis.   Last seen in clinic on 6/3/2024  Today, she reports, that she has no blood clots. She went to urgent care for swelling, which she now feels may have been related to sitting for long periods of time at work.    Patient is otherwise feeling well, without  additional skin concerns.    Labs Reviewed:  Labs from June 4, 2024 - Vitamin E was elevated, asked to stop taking via [employer] message - will plan to repeat in September    Physical Exam:  Vitals: There were no vitals taken for this visit.  SKIN:- There was no examination - same photos from last visit from [facility] 3, 2024  - No other lesions of concern on areas examined.     Medications:  Current Outpatient Medications   Medication Sig Dispense Refill    benzoyl peroxide 5 % external liquid Use daily as directed 226 g 11    bimatoprost (LATISSE) 0.03 % external opthalmic solution Apply 1 drop topically At Bedtime 5 mL 11    clindamycin (CLEOCIN T) 1 % external solution Apply topically 2 times daily as needed (scalp bumps) Use with benzoyl peroxide 60 mL 11    minoxidil (LONITEN) 2.5 MG tablet Take half tablet (1.25 mg) by mouth every other day alternating with one tablet daily 90 tablet 1    NALTREXONE HCL PO Take 4.5 mg by mouth daily      NONFORMULARY Please provide 1 scalp prosthesis for alopecia areata 1 each 1    ritlecitinib tosylate (LITFULO) 50 MG CAPS Take 50 mg by mouth daily 60 capsule 1    vitamin D3 (CHOLECALCIFEROL) 125 MCG (5000 UT) tablet Take 5,000 Units by mouth      vitamin E 400 units TABS Take 400 Units by mouth daily      ketoconazole (NIZORAL) 2 % external shampoo Apply topically daily as needed for itching or irritation (Patient not taking: Reported on 6/11/2024) 120 mL 11     No current facility-administered medications for this visit.      Past Medical/Surgical History:   Patient Active Problem List   Diagnosis    Fatigue     No past medical history on file.    CC Referred Self, MD  No address on file on close of this encounter.

## 2024-06-11 ENCOUNTER — TELEPHONE (OUTPATIENT)
Dept: DERMATOLOGY | Facility: CLINIC | Age: 54
End: 2024-06-11

## 2024-06-11 ENCOUNTER — VIRTUAL VISIT (OUTPATIENT)
Dept: DERMATOLOGY | Facility: CLINIC | Age: 54
End: 2024-06-11
Payer: COMMERCIAL

## 2024-06-11 DIAGNOSIS — R79.9 ABNORMAL BLOOD CHEMISTRY TEST: ICD-10-CM

## 2024-06-11 DIAGNOSIS — M35.9 AUTOIMMUNE DISEASE (H): ICD-10-CM

## 2024-06-11 DIAGNOSIS — Z51.81 MEDICATION MONITORING ENCOUNTER: ICD-10-CM

## 2024-06-11 DIAGNOSIS — L63.9 ALOPECIA AREATA: Primary | ICD-10-CM

## 2024-06-11 DIAGNOSIS — R78.9 ABNORMAL HEMATOLOGY TEST RESULT: ICD-10-CM

## 2024-06-11 PROCEDURE — 99214 OFFICE O/P EST MOD 30 MIN: CPT | Mod: 93 | Performed by: DERMATOLOGY

## 2024-06-11 NOTE — NURSING NOTE
Lupe Deluna's goals for this visit include:   Chief Complaint   Patient presents with    Hair Loss     Hair loss  follow up, after some swelling in the legs        She requests these members of her care team be copied on today's visit information:     PCP: No Ref-Primary, Physician    Referring Provider:  Referred Self, MD  No address on file    There were no vitals taken for this visit.    Do you need any medication refills at today's visit?         Darcie DOWD        Teledermatology Nurse Call Patients:     Are you in the [facility] at the time of the encounter? yes    Today's visit will be billed to you and your insurance.    A teledermatology visit is not as thorough as an in-person visit and the quality of the photograph sent may not be of the same quality as that taken by the dermatology clinic.

## 2024-06-11 NOTE — LETTER
6/11/2024      Lupe Deluna  [address] [address]      Dear Colleague,    Thank you for referring your patient, Lupe Deluna, to the [facility]. Please see a copy of my visit note below.    [facility] Dermatology Note  Encounter Date: Jun 11, 2024  Telephone ). Location of teledermatologist: [facility]. Start time: 2:23p. End time: 2:30p.      Dermatology Problem List:  1. # Alopecia universalis,now with evidence of hair regrowth  - Biopsies: 9/24/20 (consistent with alopecia areata; full report in Media tab)  - SALT score: 06/03/24: 95%  - Prior Tx: baricitinib 4mg (lost all hair), tofacitinib 5mg BID, ritlecitinib 50mg daily (lost all hair)  - Current tx: Minoxidil 2.5 mg, alternating with 1.25 mg every day              - Restarted 4/4/23              - Safety labs: 10/02/2023-Hepatitis Panel, Quantiferon                # Elevated BP readings unique to clinic visits    #Elevated Vitamin E level - asked to discontinue supplement, repeat in the fall    # slightly abnormal lipids, stable  - asked patient to also review with her primary care provider    ____________________________________________    Assessment & Plan:     # Alopecia Areata/Universalis  Chronic, active, flaring today with loss of majority of scalp hair after transitioning from tofacitinib to ritlecitinib in October 2023.  Previously, patient had had a good response on oral tofacitinib, but due lack of insurance coverage had to switch to baricitinib and subsequently lost hair.  Had improvement after restarting tofacitinib for short period of time, but then after transitioning to ritlicetinib in October 2023, has had more hair loss.  Given concomitant intermittent swelling of the right lower extremity (see below) and lack of efficacy of ritlecitinib, will stop all oral hair- oriented systemic medications at this time and await for ultrasound  results to rule out DVT, as patient has PCP appointment on Friday.  - restart alternating 0.5 tablet of Minoxidil 2.5 mg with 1 tablet daily  - Start upadacitinib daily  - Discontinue vitamin d for now (Last lab: 6/4/24-(94)  - Repeat viatmin d and vitamin E in september  - Labs ordered:vitamin D and Vitamin E     # Intermittent right lower extremity swelling - no evidence of DVT on ultrasound  Although no swelling is observed today, patient provided photograph of swelling of the right lower extremity at her last visit.  She wonders if this may be to standing on her feet for a long time.  However, out of an abundance of caution given that patient is on a Dayo inhibitor with known adverse effects of thrombosis, willrititinib and oral minoxidil and await ultrasound results from patient's upcoming appointment on Friday, June 7th.      # Elevated Vitamin D and Vitamin E  - Discontinue vitamin d  - repeat labs in September    Procedures Performed:    None    Follow-up: 5 month(s) in-person, or earlier for new or changing lesions    Staff and Scribe:     Scribe Disclosure:   I, Corinne Chirinos, am serving as a scribe; to document services personally performed by Zainab Oliver MD -based on data collection and the provider's statements to me.     Provider Disclosure:  I agree with above History, Review of Systems, Physical exam and Plan.  I have reviewed the content of the documentation and have edited it as needed. I have personally performed the services documented here and the documentation accurately represents those services and the decisions I have made.      Electronically signed by:  Zainab Oliver MD  Professor   Department of Dermatology  [facility]     ____________________________________________    CC: Hair Loss (Hair loss  follow up, after some swelling in the legs )    HPI:  Ms. Lupe Deluna is a(n) 53 year old female who presents today for follow-up  of her alopecia universalis.    Last seen in clinic on 6/3/2024  Today, she reports, that she has no blood clots. She went to urgent care for swelling, which she now feels may have been related to sitting for long periods of time at work.    Patient is otherwise feeling well, without additional skin concerns.    Labs Reviewed:  Labs from June 4, 2024 - Vitamin E was elevated, asked to stop taking via [employer] message - will plan to repeat in September    Physical Exam:  Vitals: There were no vitals taken for this visit.  SKIN:- There was no examination - same photos from last visit from Joycelyn 3, 2024  - No other lesions of concern on areas examined.     Medications:  Current Outpatient Medications   Medication Sig Dispense Refill     benzoyl peroxide 5 % external liquid Use daily as directed 226 g 11     bimatoprost (LATISSE) 0.03 % external opthalmic solution Apply 1 drop topically At Bedtime 5 mL 11     clindamycin (CLEOCIN T) 1 % external solution Apply topically 2 times daily as needed (scalp bumps) Use with benzoyl peroxide 60 mL 11     minoxidil (LONITEN) 2.5 MG tablet Take half tablet (1.25 mg) by mouth every other day alternating with one tablet daily 90 tablet 1     NALTREXONE HCL PO Take 4.5 mg by mouth daily       NONFORMULARY Please provide 1 scalp prosthesis for alopecia areata 1 each 1     ritlecitinib tosylate (LITFULO) 50 MG CAPS Take 50 mg by mouth daily 60 capsule 1     vitamin D3 (CHOLECALCIFEROL) 125 MCG (5000 UT) tablet Take 5,000 Units by mouth       vitamin E 400 units TABS Take 400 Units by mouth daily       ketoconazole (NIZORAL) 2 % external shampoo Apply topically daily as needed for itching or irritation (Patient not taking: Reported on 6/11/2024) 120 mL 11     No current facility-administered medications for this visit.      Past Medical/Surgical History:   Patient Active Problem List   Diagnosis     Fatigue     No past medical history on file.    CC Referred Self, MD  No address on file on close of this encounter.        Again, thank you for allowing me to participate in the care of your patient.        Sincerely,        Zainab Oliver MD

## 2024-06-11 NOTE — TELEPHONE ENCOUNTER
[facility] Call Center    Phone Message    May a detailed message be left on voicemail: yes     Reason for Call: Other: Lupe had the check in phone call this morning but no other calls from the clinic yet, she just wants to be sure she isn't penalized as a no show & isn't charged for an appt that didn't take place, if Dr Oliver calls she will be able to take it     Action Taken: Other: MG    Travel Screening: Not Applicable     Date of Service:

## 2024-06-13 NOTE — PROGRESS NOTES
Medication Therapy Management (MTM) Encounter    ASSESSMENT:                            Alopecia Areata/Universalis: Uncontrolled, Litfulo ineffective. I think Rinvoq will be a great option for her since Xeljanz was effective for her and possibly the JAK1 inhibition of Rinvoq will be more effective (Xeljanz inhibits CANDIDA 1 and JAK3 and Litfulo inhibits JAK3). Will start with 15 mg and increase to 30 mg based on efficacy and lab results. We reviewed side effects, lab monitoring, immunosuppression. She will avoid grapefruit juice. In addition, if not covered by insurance, we can apply her for the Rinvoq PAP. Reviewed to restart minixidil.   Due for Prevnar 20.     Supplements: Remain off Vitamin D and E -- will recheck levels with future lab appt.     PLAN:                            Order sent for Rinvoq 15 mg daily to [facility] (#[phone]). They will contact patient pending insurance coverage.   Restart Minoxidil 2.5 mg half tablet (1.25 mg) alternating with 1 tablet (2.5 mg) daily     Follow-up: Kenny pending Rinvoq coverage    SUBJECTIVE/OBJECTIVE:                          Lupe Deluna is a 53 year old female called for a follow up visit. She was referred to me from Dr. Oliver.      Reason for visit: Discuss Rinvoq start    Allergies/ADRs: Reviewed in chart  Past Medical History: Reviewed in chart  Tobacco: She reports that she has quit smoking. She has never used smokeless tobacco.  Alcohol: reviewed in chart    Medication Adherence/Access: Patient was familiar with her medications, but would like to be on the least amount of meds possible    Alopecia Areata/Universalis:   Met with Dr. Oliver on 6/3/24 and was having right leg swelling therefore Xeljanz and minoxidil were held. Was found to be flaring as well and had a better response on Xeljanz. She underwent evaluation and was negative for DVT. She did not feel like the Litfulo was doing anything at all.   Admits that in the past  Xeljanz was most effective, but she was never on it long enough to get full hair growth   At follow up on 6/11/24 it was recommended to restart minoxidil and start Rinvoq. She is familiar with Rinvoq from a facebook page she is a part of. She did not restart minoxidil.   Topicals on file -- she is not currently using but has at home if needed.   Patient previously was on Xeljanz via [employer] program, but due to changes in the program we switched her to Litfulo which she started Oct 2023.   History of folliculitis on Olumiant.    Biopsy 9/24/20 (consistent with alopecia areata)    SALT score: 06/03/24: 95%     Pre-Biologic Screening:   Hep B Surface Antibody  Hep B Core Antibody  Reactive 1/9/24  Non-reactive (1/9/24)   Hep B Surface Antigen Non-reactive (1/9/24)    Hep C Antibody  Non-reactive (5/16/22)    HIV Antigen Antibody Non-reactive (5/16/22)    Quantiferon TB Gold Negative (1/9/24)    **Hep B 3-series completed 6/8/23    CBC - checked 6/4/24  CMP - checked 6/4/24    Lab Test 06/04/24  1129 01/09/24  1403   CHOL 234* 234*   HDL 76 68   * 143*   TRIG 116 113     All patients on biologics should avoid live vaccines (varicella/VZV, intranasal influenza, MMR, or yellow fever vaccine (if traveling))    Immunization History   Administered Status   Covid-19 Booster Not done - patient declines   Influenza (annual, 0708-0079) Declines.    Pneumococcal  Prevnar-13: n/a  Pneumovax-23: n/a   Prevnar-20: n/a Due to receive     Tetanus/Tdap  Complete   Shingrix Complete   RSV (only for ? 60 years old)  N/a     Supplements:   Last Vitamin D level = 94 on 6/4/24   Last Vitamin E level = 28.2 on 6/4/24  **was recommended to stop both supplements and recheck after 3-4 months. No longer taking.       Today's Vitals: There were no vitals taken for this visit.  ----------------    I spent 12 minutes with this patient today. All changes were made via collaborative practice agreement with Zainab Oliver. A copy  of the visit note was provided to the patient's provider(s).    A summary of these recommendations was sent via [employer].    Post Discharge Medication Reconciliation Status: discharge medications reconciled and changed, per note/orders.    Zeynep Julio, Pharm.D., [facility]   Medication Therapy Management Pharmacist   [facility] Dermatology    Telemedicine Visit Details  Type of service:  Telephone visit  Start Time: 7:31 AM  End Time: 7:43 AM     Medication Therapy Recommendations  No medication therapy recommendations to display

## 2024-06-14 ENCOUNTER — TELEPHONE (OUTPATIENT)
Dept: DERMATOLOGY | Facility: CLINIC | Age: 54
End: 2024-06-14

## 2024-06-14 ENCOUNTER — VIRTUAL VISIT (OUTPATIENT)
Dept: DERMATOLOGY | Facility: CLINIC | Age: 54
End: 2024-06-14
Attending: DERMATOLOGY
Payer: COMMERCIAL

## 2024-06-14 DIAGNOSIS — L63.9 ALOPECIA AREATA: Primary | ICD-10-CM

## 2024-06-14 DIAGNOSIS — Z71.89 ENCOUNTER FOR HERB AND VITAMIN SUPPLEMENT MANAGEMENT: ICD-10-CM

## 2024-06-14 RX ORDER — UPADACITINIB 15 MG/1
15 TABLET, EXTENDED RELEASE ORAL DAILY
Qty: 30 TABLET | Refills: 2 | OUTPATIENT
Start: 2024-06-14 | End: 2024-06-17

## 2024-06-14 NOTE — Clinical Note
6/14/2024       RE: Lupe Deluna  [address] [facility] [address]     Dear Colleague,    Thank you for referring your patient, Lupe Deluna, to the [facility] RHEUMATOLOGY CLINIC [place] at [facility]. Please see a copy of my visit note below.    Medication Therapy Management (MTM) Encounter    ASSESSMENT:                            Alopecia Areata/Universalis: Well controlled on current therapy, but unable to continue PAP program in 2024. I spoke with Dr. Rouse today who was agreeable to switching her to Litfulo. Patient would like to start prior to 2024 to ensure she tolerates and it is effective, which I think is reasonable. Today we discussed the mechanism of action, dosing, side effects, and immunosuppression. If started, within one month will check CBC and LFTs (lipids after 3 months). If covered, we will likely be able to use a coupon or PAP since FDA approved.   Encouraged her to restart minoxidil as well, especially before restarting Litfulo. Per chart review, ok to be on both. Will refill today.   Due for Quantiferon TB Gold. Could consider rechecking her Hep B serologies in the future to ensure her vaccination was effective at achieving immunity.   Encouraged her to obtain influenza, COVID-19 and Prevnar 20 (declined influenza and COVID-19).     Supplements: Ok to continue supplements, last Vitamin D level normal but I recommended that she not increase her dose since it was at the high end of normal.     PLAN:                            Order sent for Litfulo 50 mg daily to [facility] (#[phone]). They will contact patient pending insurance coverage.   Minoxidil 2.5 mg half tablet (1.25 mg) daily sent to [facility]  Future Quantiferon TB Gold and Hep B serologies  Encouraged her to obtain influenza, COVID-19 and Prevnar 20 (declined influenza and COVID-19).     Follow-up: Kenny pending  Litfulo coverage    SUBJECTIVE/OBJECTIVE:                          Lupe Deluna is a 53 year old female called for a follow up visit. She was referred to me from Dr. Oliver.      Reason for visit: Discuss switch to Xeljanz to Litfulo.    Allergies/ADRs: Reviewed in chart  Past Medical History: Reviewed in chart  Tobacco: She reports that she has quit smoking. She has never used smokeless tobacco.  Alcohol: reviewed in chart    Medication Adherence/Access: Patient was familiar with her medications, but would like to be on the least amount of meds possible, therefore she has stopped medications in the past based on lab values.     Alopecia Areata/Universalis:   - minoxidil 1.25 mg alternating with 2.5 mg daily   Met with Dr. Oliver on 6/3/24 and was having right leg swelling therefore Xeljanz and minoxidil were held. Was found to be flaring as well and had a better response on Xeljanz. She underwent evaluation and was negative for DVT.   At follow up on 6/11/24 it was recommended to restart minoxidil and start RInvoq.   Topicals on file -- she is not currently using but has at home if needed.   Patient previously was on Xeljanz via [employer] program, but du eto changes in the program we switched her to Litfulo which she started Oct 2023. Hair growth initially was stable, but she felt that Xeljanz worked better for her. She did switch back to Xeljanz ***.   At last [facility] appt we also restarted her minoxidil at 1.25 mg daily (she stopped on her own to lessen meds), then the dose was increased to 1.25 mg alternating with 2.5 mg daily.   History of folliculitis on Olumiant.    Biopsy 9/24/20 (consistent with alopecia areata)    Wasn't on long enough to get full regrowth. Totally off completely. Not doing anything so what's the point.     SALT score: 06/03/24: 95%     Pre-Biologic Screening:   Hep B Surface Antibody  Hep B Core Antibody  Reactive 1/9/24  Non-reactive (1/9/24)   Hep B Surface Antigen Non-reactive  (1/9/24)    Hep C Antibody  Non-reactive (5/16/22)    HIV Antigen Antibody Non-reactive (5/16/22)    Quantiferon TB Gold Negative (1/9/24)    **Hep B 3-series completed 6/8/23    CBC - checked 6/4/24  CMP - checked 6/4/24    Lab Test 06/04/24  1129 01/09/24  1403   CHOL 234* 234*   HDL 76 68   * 143*   TRIG 116 113     All patients on biologics should avoid live vaccines (varicella/VZV, intranasal influenza, MMR, or yellow fever vaccine (if traveling))    Immunization History   Administered Status   Covid-19 Booster Not done - patient declines   Influenza (annual, 1728-1854) Reports she is not sure if she will get this year. Last done 12/14/22 , avoid live FluMist   Pneumococcal  Prevnar-13: n/a  Pneumovax-23: n/a   Prevnar-20: n/a Due to receive     Tetanus/Tdap  Complete   Shingrix Complete   RSV (only for ? 60 years old)  N/a     previously followed with Integrative medicine and was started on LDN, which was titrated up to 4.5mg at HS     Supplements:   - Vitamin E 400 units daily   - Vitamin D 5000 units + K2  Last Vitamin D level = 94 on 6/4/24   Last Vitamin E level = 28.2 on 6/4/24  **was recommended to stop both supplements and recheck after 3-4 months.       Today's Vitals: There were no vitals taken for this visit.  ----------------    I spent 16 minutes with this patient today. All changes were made via collaborative practice agreement with Zainab Oliver. A copy of the visit note was provided to the patient's provider(s).    A summary of these recommendations was sent via [employer].    Post Discharge Medication Reconciliation Status: {ACO Med Rec (Provider):[id number]}.    Zeynep Julio, Pharm.D., BCACP   Medication Therapy Management Pharmacist   [facility] Dermatology    Telemedicine Visit Details  Type of service:  Telephone visit  Start Time:  12:32 PM  End Time:  12:48 PM     Medication Therapy Recommendations  No medication therapy recommendations to display       Medication  Therapy Management (MTM) Encounter    ASSESSMENT:                            Alopecia Areata/Universalis: Uncontrolled, Litfulo ineffective. I think Rinvoq will be a great option for her since Xeljanz was effective for her and possibly the JAK1 inhibition of Rinvoq will be more effective (Xeljanz inhibits CANDIDA 1 and JAK3 and Litfulo inhibits JAK3). Will start with 15 mg and increase to 30 mg based on efficacy and lab results. We reviewed side effects, lab monitoring, immunosuppression. She will avoid grapefruit juice. In addition, if not covered by insurance, we can apply her for the Rinvoq PAP. Reviewed to restart minixidil.   Due for Prevnar 20.     Supplements: Remain off Vitamin D and E -- will recheck levels with future lab appt.     PLAN:                            Order sent for Rinvoq 15 mg daily to [facility] (#[phone]). They will contact patient pending insurance coverage.   Restart Minoxidil 2.5 mg half tablet (1.25 mg) alternating with 1 tablet (2.5 mg) daily     Follow-up: Kenny pending Rinvoq coverage    SUBJECTIVE/OBJECTIVE:                          Lupe Deluna is a 53 year old female called for a follow up visit. She was referred to me from Dr. Oliver.      Reason for visit: Discuss Rinvoq start    Allergies/ADRs: Reviewed in chart  Past Medical History: Reviewed in chart  Tobacco: She reports that she has quit smoking. She has never used smokeless tobacco.  Alcohol: reviewed in chart    Medication Adherence/Access: Patient was familiar with her medications, but would like to be on the least amount of meds possible    Alopecia Areata/Universalis:   Met with Dr. Oliver on 6/3/24 and was having right leg swelling therefore Xeljanz and minoxidil were held. Was found to be flaring as well and had a better response on Xeljanz. She underwent evaluation and was negative for DVT. She did not feel like the Litfulo was doing anything at all.   Admits that in the past Xeljanz was  most effective, but she was never on it long enough to get full hair growth   At follow up on 6/11/24 it was recommended to restart minoxidil and start Rinvoq. She is familiar with Rinvoq from a facebook page she is a part of. She did not restart minoxidil.   Topicals on file -- she is not currently using but has at home if needed.   Patient previously was on Xeljanz via [employer] program, but due to changes in the program we switched her to Litfulo which she started Oct 2023.   History of folliculitis on Olumiant.    Biopsy 9/24/20 (consistent with alopecia areata)    SALT score: 06/03/24: 95%     Pre-Biologic Screening:   Hep B Surface Antibody  Hep B Core Antibody  Reactive 1/9/24  Non-reactive (1/9/24)   Hep B Surface Antigen Non-reactive (1/9/24)    Hep C Antibody  Non-reactive (5/16/22)    HIV Antigen Antibody Non-reactive (5/16/22)    Quantiferon TB Gold Negative (1/9/24)    **Hep B 3-series completed 6/8/23    CBC - checked 6/4/24  CMP - checked 6/4/24    Lab Test 06/04/24  1129 01/09/24  1403   CHOL 234* 234*   HDL 76 68   * 143*   TRIG 116 113     All patients on biologics should avoid live vaccines (varicella/VZV, intranasal influenza, MMR, or yellow fever vaccine (if traveling))    Immunization History   Administered Status   Covid-19 Booster Not done - patient declines   Influenza (annual, 9109-5954) Declines.    Pneumococcal  Prevnar-13: n/a  Pneumovax-23: n/a   Prevnar-20: n/a Due to receive     Tetanus/Tdap  Complete   Shingrix Complete   RSV (only for = 60 years old)  N/a     Supplements:   Last Vitamin D level = 94 on 6/4/24   Last Vitamin E level = 28.2 on 6/4/24  **was recommended to stop both supplements and recheck after 3-4 months. No longer taking.       Today's Vitals: There were no vitals taken for this visit.  ----------------    I spent 12 minutes with this patient today. All changes were made via collaborative practice agreement with Zainab Oliver. A copy of the visit  note was provided to the patient's provider(s).    A summary of these recommendations was sent via [employer].    Post Discharge Medication Reconciliation Status: discharge medications reconciled and changed, per note/orders.    Suyapa Cabrera, Pharm.D., [facility]   Medication Therapy Management Pharmacist   [facility] Dermatology    Telemedicine Visit Details  Type of service:  Telephone visit  Start Time: 7:31 AM  End Time: 7:43 AM     Medication Therapy Recommendations  No medication therapy recommendations to display         Again, thank you for allowing me to participate in the care of your patient.      Sincerely,    SUYAPA CABRERA, SushantD

## 2024-06-14 NOTE — LETTER
6/17/2024       RE: Lupe Deluna  [address] [facility] [address]     To whom it may concern,     I am writing this letter of medical necessity in regards to the recent denial of Rinvoq. Lupe Deluna is a pleasant 53 year old female who unfortunately suffers from alopecia areata (AA). Her diagnosis was confirmed with a biopsy done on 9/24/20. Of note, her last SALT score on 06/03/24 was 95%.      Alopecia areata is a recurrent non-scarring type of hair loss that can affect any hair-bearing area and can manifest in many different patterns. Although it is a benign condition and most patients are asymptomatic, it can cause emotional and psychosocial distress. AA is a common autoimmune disorder and there are no reliably effective therapies for AA. Suggested therapies include topicals, injections, phototherapy, and systemics. The patient has tried most of these without success along with other CANDIDA inhibitors. Of note, she did not have success with Olumiant or Litfulo, but Xeljanz was helpful for her in the past. We feel like Rinvoq will be helpful for this patient since Rinvoq also inhibits JAK1 like Xeljanz. Patient has tried the following treatments with no success:   Olumiant 4mg (lost all hair)  Xeljanz 5mg TWICE DAILY -- helpful but was no longer covered.   Litfulo 50mg daily (lost all hair)  Minoxidil 2.5 mg oral      Up until recently, there were no FDA-approved treatments for AA, however drugs that target the Janus kinase (CANDIDA) pathway have shown promise for alopecia areata; inducing hair growth in a large proportion of participants in studies.  We feel that Rinvoq therapy for advanced alopecia areata represents an important breakthrough for patients with no alternatives. Rinvoq is a JAK1 inhibitor that curbs the interferon-gamma response inflammatory pathway and AA shares the same interferon response pathway. Rinvoq also appears to stop the immune system's attack on the hair follicle  in alopecia areata. Just recently Olumiant (baracitinib), a CANDIDA 1 and CANDIDA 2 inhibitor was approved by the FDA for the treatment of alopecia areata. In addition, a phase 3 study of Rinvoq ([id number]) to Evaluate the Safety and Effectiveness of Upadacitinib Tablets in Adult and Adolescent Participants With Severe Alopecia Areata is actually being performed.    Below are studies that support the use of Rinvoq in AA:   https://clinicaltrials.gov/study/FQU36690926  Tannerdimitrios PHOENIX, Savanna PAULA, Chary Á, Kevin R. Complete response of extensive alopecia areata refractory to baricitinib after five months of treatment with upadacitinib. J Dermatolog Treat. 2024;35(1):5115105. doi:10.1080/16380344.2024.4695900  Hector X, Andreas D, Marino L, Daniel J, Lucien K, Tahmina VALENZUELA. Upadacitinib for Alopecia Areata in Different Backgrounds: A Case Series. Clin Cosmet Investig Dermatol. 2024;17:565-571. Published 2024 Mar 9. doi:10.2147/CCID.M803490  Sagrario ORTIZ, [place] SIMMS. Upadacitinib for management of recalcitrant alopecia areata: A retrospective case series. JAAD Case Rep. 2023;35:38-42. Published 2023 Mar 8. doi:10.1016/j.jdcr.2023.02.019  Jay Slaughter. Effective treatment of alopecia universalis with oral upadacitinib. JAAD Case Rep. 2022;31:80-82. Published 2022 Aug 19. doi:10.1016/j.jdcr.2022.08.014     We feel that Rinvoq tablets are the best choice of therapy for Lupe. We would like to pursue this course of therapy and are requesting that you approve our decision to provide Rinvoq to our patient, allowing us to provide the best treatment option available. We thank you for your immediate attention to this issue and we would appreciate haste in your determination.       Sincerely,    Zainab Oliver MD

## 2024-06-14 NOTE — TELEPHONE ENCOUNTER
PA Initiation    Medication: RINVOQ 15 MG PO TB24  Insurance Company: [employer] - Phone [phone] Fax [phone]  Pharmacy Filling the Rx: [facility] - [place], [place] - [facility] [address]  Filling Pharmacy Phone:    Filling Pharmacy Fax:    Start Date: 6/14/2024    Key: [id number]

## 2024-06-17 RX ORDER — UPADACITINIB 30 MG/1
30 TABLET, EXTENDED RELEASE ORAL DAILY
Qty: 30 TABLET | Refills: 2 | Status: SHIPPED | OUTPATIENT
Start: 2024-06-17

## 2024-06-18 NOTE — TELEPHONE ENCOUNTER
PRIOR AUTHORIZATION DENIED    Medication: RINVOQ 15 MG PO TB24  Insurance Company: [employer] - Phone [phone] Fax [phone]  Denial Date: 6/17/2024  Denial Reason(s):     Appeal Information:     Patient Notified: No

## 2024-06-18 NOTE — TELEPHONE ENCOUNTER
PA Initiation    Medication: RINVOQ 30 MG PO TB24 [[id number]]   Insurance Company: [employer] - Phone [phone] Fax [phone]  Pharmacy Filling the Rx: [facility] - [place], [place] - [facility] [address]  Filling Pharmacy Phone:    Filling Pharmacy Fax:    Start Date: 6/18/2024    Key: [id number]

## 2024-06-19 NOTE — TELEPHONE ENCOUNTER
Medication Appeal Initiation    Medication: RINVOQ 30 MG PO TB24  Appeal Start Date:  6/19/2024  Insurance Company: [employer] Phone: [phone]  Insurance Fax: [phone]  Comments:     Appeal faxed to  Appeals dept [phone]

## 2024-07-01 NOTE — TELEPHONE ENCOUNTER
[employer] called back and states appeal will be completed this week. Will check back at the end of the week if we dont hear back

## 2024-07-05 NOTE — TELEPHONE ENCOUNTER
Left message for [employer] to check on appeal status. Will check back again next week if we dont hear back

## 2024-07-09 NOTE — TELEPHONE ENCOUNTER
Sent my chart message to patient about denial and free drug program. Will check back again in a few days

## 2024-07-09 NOTE — TELEPHONE ENCOUNTER
MEDICATION APPEAL DENIED    Medication: RINVOQ 30 MG PO TB24  Insurance Company: [employer]  Denial Date: 6/27/2024  Denial Reason(s): denial up held  Second Level Appeal Information: [phone]  Patient Notified: yes

## 2024-07-11 NOTE — TELEPHONE ENCOUNTER
Patient would like to try for free drug. Emailed application over to patient and provider portion over to MTM

## 2024-07-15 NOTE — TELEPHONE ENCOUNTER
FREE DRUG APPLICATION INITIATED    Medication: RINVOQ 30 MG PO TB24  Free Drug Program Name:  [facility]  Date Submitted: 7/15/2024  2:45 PM  Phone #: [phone]  Fax #: [phone]  Additional Information: faxed provider portion and denials

## 2024-07-18 NOTE — TELEPHONE ENCOUNTER
Spoke with [employer] and they have not received application and denial. Verified fax and resent again today.

## 2024-07-22 NOTE — TELEPHONE ENCOUNTER
Received email from patient and she is working on her portion of application and will send to [employer]. Will check for update later this week

## 2024-07-24 NOTE — TELEPHONE ENCOUNTER
Received email from patient with her portion. Faxed over to [employer]. Will check back in a few days for an update

## 2024-07-30 NOTE — TELEPHONE ENCOUNTER
Spoke with [employer] and they didn't receive our fax with patient portion and insurance cards. Refaxed again today. Will check back later this week

## 2024-08-01 NOTE — TELEPHONE ENCOUNTER
Spoke with [employer] and they have received application. They are reviewing application. Will check back next week or another update

## 2024-08-06 NOTE — TELEPHONE ENCOUNTER
Spoke with [employer] and application is still pending. Rep states will have outcome in 7-10 business days. Will check back later this week for an update

## 2024-08-08 NOTE — TELEPHONE ENCOUNTER
Faxed over copy of insurance cards over to [employer]. Will check back again next week for an update

## 2024-08-12 NOTE — TELEPHONE ENCOUNTER
Spoke with [employer] and they have received insurance cards. They are currently review application. Will check back later this week for an update

## 2024-08-15 NOTE — TELEPHONE ENCOUNTER
Spoke with [employer] again and application is still pending. Rep states should have answer in 7-10 days. Will check back again next week

## 2024-08-20 NOTE — TELEPHONE ENCOUNTER
Spoke with [employer] and they are still reviewing application. Will check back again later this week

## 2024-08-23 NOTE — TELEPHONE ENCOUNTER
FREE DRUG APPLICATION APPROVED    Medication: RINVOQ 30 MG PO TB24  Program Name:  [employer]  Effective Date: 8/23/2024  Expiration Date: 8/23/2025  Pharmacy Filling the Rx: [facility] - [address]  Patient Notified: yes  Additional Information:

## 2024-09-04 ENCOUNTER — LAB (OUTPATIENT)
Dept: LAB | Facility: CLINIC | Age: 54
End: 2024-09-04
Payer: COMMERCIAL

## 2024-09-04 LAB
BASOPHILS # BLD AUTO: 0 10E3/UL (ref 0–0.2)
BASOPHILS NFR BLD AUTO: 1 %
EOSINOPHIL # BLD AUTO: 0.2 10E3/UL (ref 0–0.7)
EOSINOPHIL NFR BLD AUTO: 2 %
ERYTHROCYTE [DISTWIDTH] IN BLOOD BY AUTOMATED COUNT: 14.7 % (ref 10–15)
HCT VFR BLD AUTO: 39.9 % (ref 35–47)
HGB BLD-MCNC: 12.8 G/DL (ref 11.7–15.7)
IMM GRANULOCYTES # BLD: 0 10E3/UL
IMM GRANULOCYTES NFR BLD: 0 %
LYMPHOCYTES # BLD AUTO: 2.3 10E3/UL (ref 0.8–5.3)
LYMPHOCYTES NFR BLD AUTO: 28 %
MCH RBC QN AUTO: 26.5 PG (ref 26.5–33)
MCHC RBC AUTO-ENTMCNC: 32.1 G/DL (ref 31.5–36.5)
MCV RBC AUTO: 83 FL (ref 78–100)
MONOCYTES # BLD AUTO: 0.5 10E3/UL (ref 0–1.3)
MONOCYTES NFR BLD AUTO: 6 %
NEUTROPHILS # BLD AUTO: 5.1 10E3/UL (ref 1.6–8.3)
NEUTROPHILS NFR BLD AUTO: 63 %
PLATELET # BLD AUTO: 323 10E3/UL (ref 150–450)
RBC # BLD AUTO: 4.83 10E6/UL (ref 3.8–5.2)
WBC # BLD AUTO: 8.2 10E3/UL (ref 4–11)

## 2024-09-04 PROCEDURE — 99441 VITAMIN E: CPT | Mod: 93 | Performed by: DERMATOLOGY

## 2024-09-04 PROCEDURE — 80061 LIPID PANEL: CPT | Mod: 93 | Performed by: DERMATOLOGY

## 2024-09-04 PROCEDURE — 99000 SPECIMEN HANDLING OFFICE-LAB: CPT | Mod: 93 | Performed by: DERMATOLOGY

## 2024-09-04 PROCEDURE — 84446 ASSAY OF VITAMIN E: CPT | Mod: 90 | Performed by: DERMATOLOGY

## 2024-09-04 PROCEDURE — 85025 COMPLETE CBC W/AUTO DIFF WBC: CPT | Mod: 93 | Performed by: DERMATOLOGY

## 2024-09-04 PROCEDURE — 82306 VITAMIN D 25 HYDROXY: CPT | Performed by: DERMATOLOGY

## 2024-09-04 PROCEDURE — 36415 COLL VENOUS BLD VENIPUNCTURE: CPT | Mod: 93 | Performed by: DERMATOLOGY

## 2024-09-04 PROCEDURE — 80053 COMPREHEN METABOLIC PANEL: CPT | Mod: 93 | Performed by: DERMATOLOGY

## 2024-09-05 LAB
ALBUMIN SERPL BCG-MCNC: 4.5 G/DL (ref 3.5–5.2)
ALP SERPL-CCNC: 71 U/L (ref 40–150)
ALT SERPL W P-5'-P-CCNC: 14 U/L (ref 0–50)
ANION GAP SERPL CALCULATED.3IONS-SCNC: 9 MMOL/L (ref 7–15)
AST SERPL W P-5'-P-CCNC: 21 U/L (ref 0–45)
BILIRUB SERPL-MCNC: 0.4 MG/DL
BUN SERPL-MCNC: 18.5 MG/DL (ref 6–20)
CALCIUM SERPL-MCNC: 9.3 MG/DL (ref 8.8–10.4)
CHLORIDE SERPL-SCNC: 108 MMOL/L (ref 98–107)
CHOLEST SERPL-MCNC: 210 MG/DL
CREAT SERPL-MCNC: 1.02 MG/DL (ref 0.51–0.95)
EGFRCR SERPLBLD CKD-EPI 2021: 65 ML/MIN/1.73M2
FASTING STATUS PATIENT QL REPORTED: NO
FASTING STATUS PATIENT QL REPORTED: NO
GLUCOSE SERPL-MCNC: 82 MG/DL (ref 70–99)
HCO3 SERPL-SCNC: 25 MMOL/L (ref 22–29)
HDLC SERPL-MCNC: 68 MG/DL
LDLC SERPL CALC-MCNC: 82 MG/DL
NONHDLC SERPL-MCNC: 142 MG/DL
POTASSIUM SERPL-SCNC: 4.8 MMOL/L (ref 3.4–5.3)
PROT SERPL-MCNC: 6.6 G/DL (ref 6.4–8.3)
SODIUM SERPL-SCNC: 142 MMOL/L (ref 135–145)
TRIGL SERPL-MCNC: 302 MG/DL
VIT D+METAB SERPL-MCNC: 45 NG/ML (ref 20–50)

## 2024-09-06 LAB
A-TOCOPHEROL VIT E SERPL-MCNC: 13.3 MG/L
BETA+GAMMA TOCOPHEROL SERPL-MCNC: 0.6 MG/L

## 2024-11-12 ENCOUNTER — OFFICE VISIT (OUTPATIENT)
Dept: DERMATOLOGY | Facility: CLINIC | Age: 54
End: 2024-11-12
Payer: COMMERCIAL

## 2024-11-12 VITALS — SYSTOLIC BLOOD PRESSURE: 138 MMHG | OXYGEN SATURATION: 100 % | DIASTOLIC BLOOD PRESSURE: 84 MMHG | HEART RATE: 54 BPM

## 2024-11-12 DIAGNOSIS — M35.9 AUTOIMMUNE DISEASE (H): ICD-10-CM

## 2024-11-12 DIAGNOSIS — Z51.81 MEDICATION MONITORING ENCOUNTER: ICD-10-CM

## 2024-11-12 DIAGNOSIS — L63.1 ALOPECIA AREATA UNIVERSALIS: Primary | ICD-10-CM

## 2024-11-12 PROCEDURE — 99213 OFFICE O/P EST LOW 20 MIN: CPT | Performed by: DERMATOLOGY

## 2024-11-12 RX ORDER — MINOXIDIL 2.5 MG/1
TABLET ORAL
Qty: 90 TABLET | Refills: 1 | Status: SHIPPED | OUTPATIENT
Start: 2024-11-12

## 2024-11-12 NOTE — LETTER
11/12/2024      Lupe Deluna  [address] [facility] [address]      Dear Colleague,    Thank you for referring your patient, Lupe Deluna, to the [facility]. Please see a copy of my visit note below.    [facility] Dermatology Note  Encounter Date: Nov 12, 2024  Office Visit     Dermatology Problem List:  1.  Alopecia universalis,now with evidence of hair regrowth  - Biopsies: 9/24/20 (consistent with alopecia areata; full report in Media tab)  - SALT score: 06/03/24: 95%  - Prior Tx: baricitinib 4mg (lost all hair), tofacitinib 5mg BID, ritlecitinib 50mg daily (lost all hair)  - Current tx: alternating 0.5 tablet of Minoxidil 2.5 mg with 1 tablet daily, upadacitinib daily, dhs zinc twice a week              - Restarted 4/4/23              - Safety labs: 10/02/2023-Hepatitis Panel, Quantiferon                # Elevated BP readings unique to clinic visits     #Elevated Vitamin E level - asked to discontinue supplement, repeat in the fall     # slightly abnormal lipids, stable  - asked patient to also review with her primary care provider  ____________________________________________    Assessment & Plan:     # Alopecia Areata/Universalis, stable with some hair growth  - continue  alternating 0.5 tablet of Minoxidil 2.5 mg with 1 tablet daily  - continue upadacitinib daily (started sept. 2024)  - Restart vitamin d 1000 international unit(s) in the winter time  - restart DHS Zinc shampoo twice a week  - B/P: 138/84  - Labs ordered for next visit (cbc,cmp,lipids)      Procedures Performed:   None      Follow-up: 6 month(s) in-person, or earlier for new or changing lesions    Staff and Scribe:     Scribe Disclosure:   I, Corinne Chirinos, am serving as a scribe; to document services personally performed by Zainab Oliver MD -based on data collection and the provider's statements to me.     Provider Disclosure:  I agree with above History,  Review of Systems, Physical exam and Plan.  I have reviewed the content of the documentation and have edited it as needed. I have personally performed the services documented here and the documentation accurately represents those services and the decisions I have made.      Electronically signed by:  Zainab Oliver MD  Professor   Department of Dermatology  [facility]     ____________________________________________    CC: Hair Loss ( follow up, AOC- none)    HPI:  Ms. Lupe Deluna is a 54 year old female who presents as a return patient for follow-up of hair loss, diagnosed as alopecia universalis. .   - Last seen in-clinic on 6/11/24  - Shedding or thinning, or both: none  - Current tx: alternating 0.5 mg tablet with minoxidil 2.5 mg every other day; upadacitinib daily; Dr. Snell shampoo     no Any new medications, supplements, or products? (please list below)     no Scalp pain   no Scalp burning   no Scalp itching    Yes growing Eyebrow changes    Yes growing Eyelash changes   no Beard changes    no Other body hair changes    Yes-strong Nail changes    no Additional symptoms? (please list below)     - Overall course: She states that she is not experiencing swelling, but she has noticed some facial hair.  Patient, reports, that she went to see her PCP for leg swelling, which they recommended that she needs to move more.   Patient is otherwise feeling well, in usual state of health, and has no additional skin concerns today.     Labs:  9/4/24 CBC , CMP , and Vitamin D reviewed.    Physical Exam:  Vitals: /84   Pulse 54   SpO2 100%   GEN: Well developed, well-nourished, in no acute distress, in a pleasant mood.    SKIN: Focused examination of face and scalp was performed.  - 20% of scalp with no hair at all  - some hair growth above the ears  - no diffuse erythema   - no perifollicular erythema  - no perifollicular scale   - no scaling of the scalp   - negative hair pull test   -  normal eyelash density  - normal eyebrow density  - no nail pitting or dystrophy   - no scalp folliculitis/pustules   - No other lesions of concern on areas examined.     Medications:  Current Outpatient Medications   Medication Sig Dispense Refill    minoxidil (LONITEN) 2.5 MG tablet Take half tablet (1.25 mg) by mouth every other day alternating with one tablet daily 90 tablet 1    NONFORMULARY Please provide 1 scalp prosthesis for alopecia areata 1 each 1    Upadacitinib ER (RINVOQ) 30 MG TB24 Take 30 mg by mouth daily 30 tablet 2    benzoyl peroxide 5 % external liquid Use daily as directed 226 g 11    bimatoprost (LATISSE) 0.03 % external opthalmic solution Apply 1 drop topically At Bedtime 5 mL 11    clindamycin (CLEOCIN T) 1 % external solution Apply topically 2 times daily as needed (scalp bumps) Use with benzoyl peroxide 60 mL 11    ketoconazole (NIZORAL) 2 % external shampoo Apply topically daily as needed for itching or irritation (Patient not taking: Reported on 6/11/2024) 120 mL 11    NALTREXONE HCL PO Take 4.5 mg by mouth daily       No current facility-administered medications for this visit.      Past Medical History:   Patient Active Problem List   Diagnosis    Fatigue     No past medical history on file.        Again, thank you for allowing me to participate in the care of your patient.      Sincerely,    Zainab Oliver MD

## 2024-11-12 NOTE — PROGRESS NOTES
[facility] Dermatology Note  Encounter Date: Nov 12, 2024  Office Visit     Dermatology Problem List:  1.  Alopecia universalis,now with evidence of hair regrowth  - Biopsies: 9/24/20 (consistent with alopecia areata; full report in Media tab)  - SALT score: 06/03/24: 95%  - Prior Tx: baricitinib 4mg (lost all hair), tofacitinib 5mg BID, ritlecitinib 50mg daily (lost all hair)  - Current tx: alternating 0.5 tablet of Minoxidil 2.5 mg with 1 tablet daily, upadacitinib daily, dhs zinc twice a week              - Restarted 4/4/23              - Safety labs: 10/02/2023-Hepatitis Panel, Quantiferon                # Elevated BP readings unique to clinic visits     #Elevated Vitamin E level - asked to discontinue supplement, repeat in the fall     # slightly abnormal lipids, stable  - asked patient to also review with her primary care provider  ____________________________________________    Assessment & Plan:     # Alopecia Areata/Universalis, stable with some hair growth  - continue  alternating 0.5 tablet of Minoxidil 2.5 mg with 1 tablet daily  - continue upadacitinib daily (started sept. 2024)  - Restart vitamin d 1000 international unit(s) in the winter time  - restart DHS Zinc shampoo twice a week  - B/P: 138/84  - Labs ordered for next visit (cbc,cmp,lipids)      Procedures Performed:   None      Follow-up: 6 month(s) in-person, or earlier for new or changing lesions    Staff and Scribe:     Scribe Disclosure:   I, Corinne Chirinos, am serving as a scribe; to document services personally performed by Zainab Oliver MD -based on data collection and the provider's statements to me.     Provider Disclosure:  I agree with above History, Review of Systems, Physical exam and Plan.  I have reviewed the content of the documentation and have edited it as needed. I have personally performed the services documented here and the documentation accurately represents those services and the decisions  I have made.      Electronically signed by:  Zainab Oliver MD  Professor   Department of Dermatology  [facility]     ____________________________________________    CC: Hair Loss ( follow up, AOC- none)    HPI:  Ms. Lupe Deluna is a 54 year old female who presents as a return patient for follow-up of hair loss, diagnosed as alopecia universalis. .   - Last seen in-clinic on 6/11/24  - Shedding or thinning, or both: none  - Current tx: alternating 0.5 mg tablet with minoxidil 2.5 mg every other day; upadacitinib daily; Dr. Alis green     no Any new medications, supplements, or products? (please list below)     no Scalp pain   no Scalp burning   no Scalp itching    Yes growing Eyebrow changes    Yes growing Eyelash changes   no Beard changes    no Other body hair changes    Yes-strong Nail changes    no Additional symptoms? (please list below)     - Overall course: She states that she is not experiencing swelling, but she has noticed some facial hair.  Patient, reports, that she went to see her PCP for leg swelling, which they recommended that she needs to move more.   Patient is otherwise feeling well, in usual state of health, and has no additional skin concerns today.     Labs:  9/4/24 CBC , CMP , and Vitamin D reviewed.    Physical Exam:  Vitals: /84   Pulse 54   SpO2 100%   GEN: Well developed, well-nourished, in no acute distress, in a pleasant mood.    SKIN: Focused examination of face and scalp was performed.  - 20% of scalp with no hair at all  - some hair growth above the ears  - no diffuse erythema   - no perifollicular erythema  - no perifollicular scale   - no scaling of the scalp   - negative hair pull test   - normal eyelash density  - normal eyebrow density  - no nail pitting or dystrophy   - no scalp folliculitis/pustules   - No other lesions of concern on areas examined.     Medications:  Current Outpatient Medications   Medication Sig Dispense Refill    minoxidil  (LONITEN) 2.5 MG tablet Take half tablet (1.25 mg) by mouth every other day alternating with one tablet daily 90 tablet 1    NONFORMULARY Please provide 1 scalp prosthesis for alopecia areata 1 each 1    Upadacitinib ER (RINVOQ) 30 MG TB24 Take 30 mg by mouth daily 30 tablet 2    benzoyl peroxide 5 % external liquid Use daily as directed 226 g 11    bimatoprost (LATISSE) 0.03 % external opthalmic solution Apply 1 drop topically At Bedtime 5 mL 11    clindamycin (CLEOCIN T) 1 % external solution Apply topically 2 times daily as needed (scalp bumps) Use with benzoyl peroxide 60 mL 11    ketoconazole (NIZORAL) 2 % external shampoo Apply topically daily as needed for itching or irritation (Patient not taking: Reported on 6/11/2024) 120 mL 11    NALTREXONE HCL PO Take 4.5 mg by mouth daily       No current facility-administered medications for this visit.      Past Medical History:   Patient Active Problem List   Diagnosis    Fatigue     No past medical history on file.    CC No referring provider defined for this encounter. on close of this encounter.

## 2024-11-27 ENCOUNTER — APPOINTMENT (OUTPATIENT)
Dept: URBAN - METROPOLITAN AREA CLINIC 252 | Age: 54
Setting detail: DERMATOLOGY
End: 2024-11-27

## 2024-11-27 VITALS — HEIGHT: 62 IN | WEIGHT: 145 LBS

## 2024-11-27 DIAGNOSIS — L82.1 OTHER SEBORRHEIC KERATOSIS: ICD-10-CM

## 2024-11-27 DIAGNOSIS — D22 MELANOCYTIC NEVI: ICD-10-CM

## 2024-11-27 DIAGNOSIS — Z71.89 OTHER SPECIFIED COUNSELING: ICD-10-CM

## 2024-11-27 DIAGNOSIS — L91.8 OTHER HYPERTROPHIC DISORDERS OF THE SKIN: ICD-10-CM

## 2024-11-27 DIAGNOSIS — L57.0 ACTINIC KERATOSIS: ICD-10-CM

## 2024-11-27 DIAGNOSIS — L81.4 OTHER MELANIN HYPERPIGMENTATION: ICD-10-CM

## 2024-11-27 DIAGNOSIS — D18.0 HEMANGIOMA: ICD-10-CM

## 2024-11-27 DIAGNOSIS — L57.8 OTHER SKIN CHANGES DUE TO CHRONIC EXPOSURE TO NONIONIZING RADIATION: ICD-10-CM

## 2024-11-27 PROBLEM — D22.5 MELANOCYTIC NEVI OF TRUNK: Status: ACTIVE | Noted: 2024-11-27

## 2024-11-27 PROBLEM — D22.4 MELANOCYTIC NEVI OF SCALP AND NECK: Status: ACTIVE | Noted: 2024-11-27

## 2024-11-27 PROBLEM — D18.01 HEMANGIOMA OF SKIN AND SUBCUTANEOUS TISSUE: Status: ACTIVE | Noted: 2024-11-27

## 2024-11-27 PROCEDURE — OTHER LIQUID NITROGEN: OTHER

## 2024-11-27 PROCEDURE — OTHER COUNSELING: OTHER

## 2024-11-27 PROCEDURE — 99213 OFFICE O/P EST LOW 20 MIN: CPT | Mod: 25

## 2024-11-27 PROCEDURE — OTHER MIPS QUALITY: OTHER

## 2024-11-27 PROCEDURE — 17000 DESTRUCT PREMALG LESION: CPT

## 2024-11-27 ASSESSMENT — LOCATION SIMPLE DESCRIPTION DERM
LOCATION SIMPLE: LEFT SCALP
LOCATION SIMPLE: RIGHT INFERIOR EYELID
LOCATION SIMPLE: RIGHT FOREHEAD
LOCATION SIMPLE: LEFT UPPER BACK
LOCATION SIMPLE: LEFT CHEEK
LOCATION SIMPLE: RIGHT UPPER BACK
LOCATION SIMPLE: RIGHT AXILLARY VAULT

## 2024-11-27 ASSESSMENT — LOCATION ZONE DERM
LOCATION ZONE: TRUNK
LOCATION ZONE: FACE
LOCATION ZONE: EYELID
LOCATION ZONE: AXILLAE
LOCATION ZONE: SCALP

## 2024-11-27 ASSESSMENT — LOCATION DETAILED DESCRIPTION DERM
LOCATION DETAILED: RIGHT SUPERIOR UPPER BACK
LOCATION DETAILED: LEFT MID-UPPER BACK
LOCATION DETAILED: LEFT SUPERIOR CENTRAL MALAR CHEEK
LOCATION DETAILED: LEFT MEDIAL UPPER BACK
LOCATION DETAILED: LEFT SUPERIOR MEDIAL UPPER BACK
LOCATION DETAILED: RIGHT LATERAL INFERIOR EYELID
LOCATION DETAILED: RIGHT INFERIOR LATERAL FOREHEAD
LOCATION DETAILED: LEFT MEDIAL FRONTAL SCALP
LOCATION DETAILED: RIGHT AXILLARY VAULT

## 2024-11-27 NOTE — HPI: FULL BODY SKIN EXAMINATION
What Type Of Note Output Would You Prefer (Optional)?: Standard Output
What Is The Reason For Today's Visit?: Full Body Skin Examination
What Is The Reason For Today's Visit? (Being Monitored For X): concerning skin lesions on an annual basis
Additional History: She presents today for evaluation and management.

## 2024-11-27 NOTE — PROCEDURE: LIQUID NITROGEN
Post-Care Instructions: - Patient was instructed to avoid picking at any of the treated lesions.
Consent: - Discussed that these are a result of cumulative sun exposure.\\n- Consent was obtained and risks were reviewed prior to procedure today. All questions were answered prior to procedure today.\\n- Risks discussed include but are not limited to pain, crusting, scabbing, blistering, scarring, temporary or permanent darker or lighter pigmentary change, recurrence, incomplete resolution, and infection.
Show Aperture Variable?: No
Show Applicator Variable?: Yes
Duration Of Freeze Thaw-Cycle (Seconds): 0
Application Tool (Optional): Liquid Nitrogen Sprayer
Detail Level: Detailed

## 2024-12-23 NOTE — PROGRESS NOTES
Medication Therapy Management (MTM) Encounter    ASSESSMENT:                            Alopecia Areata/Universalis: Patient is doing very well so far on Rinvoq and is seeing hair growth all over her scalp and her eyelashes/eyebrows.  It is still very early, therefore this is a very good sign.  Patient to continue Rinvoq and she will follow-up with Dr. Oliver in July.  At that time we can repeat her labs.  No side effects.  She is approved for the PAP until August, therefore at that time we can initiate her renewal as well.  Reviewed that due to the facial hair growth, I think it would be reasonable to decrease her minoxidil to half tablet once daily and monitor for any change.  If she notices her last, she can increase her dose again.    Supplements: Last vitamin D level at goal.  Okay to continue magnesium, med list updated.    PLAN:                            Continue Rinvoq 30 mg daily via [employer] PAP  Okay to decrease minoxidil to 2.5 mg half tablet (1.25 mg) once daily.    Follow-up: July 2025 with Dr. Oliver and MTM after to renew Rinvoq PAP    SUBJECTIVE/OBJECTIVE:                          Lupe Deluna is a 53 year old female called for a follow up visit. She was referred to me from Dr. Oliver.      Reason for visit: Rinvoq follow-up    Allergies/ADRs: Reviewed in chart  Past Medical History: Reviewed in chart  Tobacco: She reports that she has quit smoking. She has never used smokeless tobacco.  Alcohol: reviewed in chart    Medication Adherence/Access: Patient was familiar with her medications, no concerns    Alopecia Areata/Universalis:   - Rinvoq 30 mg once daily    [employer] PAP approved 8/23/24 - 8/23/25  - minoxidil 2.5 mg half tablet (1.25 mg) alternating with 1 tablet (2.5 mg) daily  Met with Dr. Oliver on 6/3/24 and was having right leg swelling therefore Xeljanz and minoxidil were held. She underwent evaluation and was negative for DVT. She did not feel like the Litfulo was doing  anything at all. Patient previously was on Xeljanz via [employer] program, but due to changes in the program we switched her to Litfulo which she started Oct 2023.  History of folliculitis on Olumiant.    At follow up on 6/11/24 it was recommended to restart minoxidil and start Rinvoq. At last [facility] appointment, we were able to get coverage for RInvoq and I recommended that she restart minoxidil.   She was able to start Rinvoq 9/13/2024  Biopsy 9/24/20 (consistent with alopecia areata)    Today she reports that she has had a lot of growth.  When she was on Xeljanz she got a lot of growth on the back of her head, but now having growth all over.  She is also seeing growth on her eyebrows and eyelashes.  However, she is noticing more hair on her face, therefore she wonders about decreasing the minoxidil.  Denies any side effects that she attributes to Rinvoq.    SALT score: 06/03/24: 95%     Pre-Biologic Screening:   Hep B Surface Antibody  Hep B Core Antibody  Reactive 1/9/24  Non-reactive (1/9/24)   Hep B Surface Antigen Non-reactive (1/9/24)    Hep C Antibody  Non-reactive (5/16/22)    HIV Antigen Antibody Non-reactive (5/16/22)    Quantiferon TB Gold Negative (1/9/24)    **Hep B 3-series completed 6/8/23    CBC - checked 9/4/24  CMP - checked 9/4/24  Lab Test 09/04/24  1322 06/04/24  1129   CHOL 210* 234*   HDL 68 76   LDL 82 135*   TRIG 302* 116     All patients on biologics should avoid live vaccines (varicella/VZV, intranasal influenza, MMR, or yellow fever vaccine (if traveling))    Immunization History   Administered Status   Covid-19 Booster Not done - patient declines   Influenza (annual, 6380-1729) Declines.    Pneumococcal  Prevnar-13: n/a  Pneumovax-23: n/a   Prevnar-20: n/a Due to receive     Tetanus/Tdap  Complete   Shingrix Complete   RSV (only for >= 60 years old)  N/a     Supplements:   - vitamin d 1000 international unit(s) in the winter time   -Magnesium glycinate 2 capsules (260 mg) daily   Last  Vitamin D level = 45 on 9/4/24  Reports that she started magnesium glycinate for cramps.      Today's Vitals: There were no vitals taken for this visit.  ----------------    I spent 10 minutes with this patient today. All changes were made via collaborative practice agreement with Zainab Oliver. A copy of the visit note was provided to the patient's provider(s).    A summary of these recommendations was declined by the patient    Zeynep Julio PharmLaurieD., [facility]   Medication Therapy Management Pharmacist   [facility] Dermatology    Telemedicine Visit Details  The patient's medications can be safely assessed via a telemedicine encounter.  Type of service:  Telephone visit  Originating Location (pt. Location): Home    Distant Location (provider location):  Off-site  Start Time:  8:04 AM  End Time:  8:14 AM     Medication Therapy Recommendations  No medication therapy recommendations to display

## 2024-12-24 ENCOUNTER — VIRTUAL VISIT (OUTPATIENT)
Dept: PHARMACY | Facility: CLINIC | Age: 54
End: 2024-12-24
Attending: DERMATOLOGY
Payer: COMMERCIAL

## 2024-12-24 DIAGNOSIS — L63.9 ALOPECIA AREATA: Primary | ICD-10-CM

## 2024-12-24 RX ORDER — VITAMIN B COMPLEX
1 TABLET ORAL DAILY
COMMUNITY

## 2025-03-22 ENCOUNTER — HEALTH MAINTENANCE LETTER (OUTPATIENT)
Age: 55
End: 2025-03-22

## 2025-06-02 DIAGNOSIS — L63.9 ALOPECIA AREATA: ICD-10-CM

## 2025-06-02 RX ORDER — UPADACITINIB 30 MG/1
30 TABLET, EXTENDED RELEASE ORAL DAILY
Qty: 90 TABLET | Refills: 2 | Status: CANCELLED | OUTPATIENT
Start: 2025-06-02

## 2025-06-02 RX ORDER — UPADACITINIB 30 MG/1
30 TABLET, EXTENDED RELEASE ORAL DAILY
Qty: 90 TABLET | Refills: 0 | Status: SHIPPED | OUTPATIENT
Start: 2025-06-02

## 2025-06-02 NOTE — PROGRESS NOTES
[facility] next [facility] 7/8/25 therefore refill sent to carry patient over to next follow up.

## 2025-06-17 ENCOUNTER — LAB (OUTPATIENT)
Dept: LAB | Facility: CLINIC | Age: 55
End: 2025-06-17
Payer: COMMERCIAL

## 2025-06-17 DIAGNOSIS — L63.1 ALOPECIA AREATA UNIVERSALIS: ICD-10-CM

## 2025-06-17 LAB
BASOPHILS # BLD AUTO: 0 10E3/UL (ref 0–0.2)
BASOPHILS NFR BLD AUTO: 0 %
EOSINOPHIL # BLD AUTO: 0.1 10E3/UL (ref 0–0.7)
EOSINOPHIL NFR BLD AUTO: 1 %
ERYTHROCYTE [DISTWIDTH] IN BLOOD BY AUTOMATED COUNT: 15.1 % (ref 10–15)
HCT VFR BLD AUTO: 40.4 % (ref 35–47)
HGB BLD-MCNC: 12.6 G/DL (ref 11.7–15.7)
IMM GRANULOCYTES # BLD: 0.2 10E3/UL
IMM GRANULOCYTES NFR BLD: 2 %
LYMPHOCYTES # BLD AUTO: 3.3 10E3/UL (ref 0.8–5.3)
LYMPHOCYTES NFR BLD AUTO: 41 %
MCH RBC QN AUTO: 26.5 PG (ref 26.5–33)
MCHC RBC AUTO-ENTMCNC: 31.2 G/DL (ref 31.5–36.5)
MCV RBC AUTO: 85 FL (ref 78–100)
MONOCYTES # BLD AUTO: 0.7 10E3/UL (ref 0–1.3)
MONOCYTES NFR BLD AUTO: 8 %
NEUTROPHILS # BLD AUTO: 3.9 10E3/UL (ref 1.6–8.3)
NEUTROPHILS NFR BLD AUTO: 47 %
PLATELET # BLD AUTO: 454 10E3/UL (ref 150–450)
RBC # BLD AUTO: 4.75 10E6/UL (ref 3.8–5.2)
WBC # BLD AUTO: 8.2 10E3/UL (ref 4–11)

## 2025-06-17 PROCEDURE — 80061 LIPID PANEL: CPT

## 2025-06-17 PROCEDURE — 36415 COLL VENOUS BLD VENIPUNCTURE: CPT

## 2025-06-17 PROCEDURE — 85025 COMPLETE CBC W/AUTO DIFF WBC: CPT

## 2025-06-17 PROCEDURE — 80053 COMPREHEN METABOLIC PANEL: CPT

## 2025-06-18 LAB
ALBUMIN SERPL BCG-MCNC: 5 G/DL (ref 3.5–5.2)
ALP SERPL-CCNC: 50 U/L (ref 40–150)
ALT SERPL W P-5'-P-CCNC: 23 U/L (ref 0–50)
ANION GAP SERPL CALCULATED.3IONS-SCNC: 12 MMOL/L (ref 7–15)
AST SERPL W P-5'-P-CCNC: 31 U/L (ref 0–45)
BILIRUB SERPL-MCNC: 0.6 MG/DL
BUN SERPL-MCNC: 12.3 MG/DL (ref 6–20)
CALCIUM SERPL-MCNC: 10.2 MG/DL (ref 8.8–10.4)
CHLORIDE SERPL-SCNC: 104 MMOL/L (ref 98–107)
CHOLEST SERPL-MCNC: 237 MG/DL
CREAT SERPL-MCNC: 0.68 MG/DL (ref 0.51–0.95)
EGFRCR SERPLBLD CKD-EPI 2021: >90 ML/MIN/1.73M2
FASTING STATUS PATIENT QL REPORTED: ABNORMAL
FASTING STATUS PATIENT QL REPORTED: NORMAL
GLUCOSE SERPL-MCNC: 93 MG/DL (ref 70–99)
HCO3 SERPL-SCNC: 25 MMOL/L (ref 22–29)
HDLC SERPL-MCNC: 73 MG/DL
LDLC SERPL CALC-MCNC: 136 MG/DL
NONHDLC SERPL-MCNC: 164 MG/DL
POTASSIUM SERPL-SCNC: 4.5 MMOL/L (ref 3.4–5.3)
PROT SERPL-MCNC: 7.1 G/DL (ref 6.4–8.3)
SODIUM SERPL-SCNC: 141 MMOL/L (ref 135–145)
TRIGL SERPL-MCNC: 141 MG/DL

## 2025-07-08 ENCOUNTER — OFFICE VISIT (OUTPATIENT)
Dept: DERMATOLOGY | Facility: CLINIC | Age: 55
End: 2025-07-08
Payer: COMMERCIAL

## 2025-07-08 VITALS — HEART RATE: 60 BPM | SYSTOLIC BLOOD PRESSURE: 144 MMHG | OXYGEN SATURATION: 97 % | DIASTOLIC BLOOD PRESSURE: 91 MMHG

## 2025-07-08 DIAGNOSIS — M35.9 AUTOIMMUNE DISEASE: ICD-10-CM

## 2025-07-08 DIAGNOSIS — L63.1 ALOPECIA AREATA UNIVERSALIS: Primary | ICD-10-CM

## 2025-07-08 DIAGNOSIS — L63.9 ALOPECIA AREATA: ICD-10-CM

## 2025-07-08 DIAGNOSIS — Z51.81 MEDICATION MONITORING ENCOUNTER: ICD-10-CM

## 2025-07-08 PROCEDURE — 3077F SYST BP >= 140 MM HG: CPT | Performed by: DERMATOLOGY

## 2025-07-08 PROCEDURE — 99214 OFFICE O/P EST MOD 30 MIN: CPT | Performed by: DERMATOLOGY

## 2025-07-08 PROCEDURE — 3080F DIAST BP >= 90 MM HG: CPT | Performed by: DERMATOLOGY

## 2025-07-08 RX ORDER — MINOXIDIL 2.5 MG/1
TABLET ORAL
Qty: 45 TABLET | Refills: 1 | Status: SHIPPED | OUTPATIENT
Start: 2025-07-08

## 2025-07-08 RX ORDER — UPADACITINIB 30 MG/1
30 TABLET, EXTENDED RELEASE ORAL DAILY
Qty: 90 TABLET | Refills: 1 | Status: SHIPPED | OUTPATIENT
Start: 2025-07-08

## 2025-07-08 NOTE — NURSING NOTE
Lupe Deluna's goals for this visit include:   Chief Complaint   Patient presents with    Hair Loss     Patient is here for a hir loss follow up, hair loss is growing, areas of concern none       She requests these members of her care team be copied on today's visit information:     PCP: No Ref-Primary, Physician    Referring Provider:  Referred Self, MD  No address on file    BP (!) 144/91   Pulse 60   SpO2 97%     Do you need any medication refills at today's visit?       Darcie Bonds EMT

## 2025-07-08 NOTE — PROGRESS NOTES
[facility] Dermatology Note  Encounter Date: Jul 8, 2025  Office Visit    Dermatology Problem List:  1.   Alopecia universalis,now with evidence of hair regrowth  - Biopsies: 9/24/20 (consistent with alopecia areata; full report in Media tab)  - SALT score: 06/03/24: 95%  - Prior Tx: baricitinib 4mg (lost all hair), tofacitinib 5mg BID, ritlecitinib 50mg daily (lost all hair)  - Current tx: half a tablet of Minoxidil 2.5 mg, upadacitinib daily, dhs zinc twice a week              - Restarted 4/4/23              - Safety labs: 10/02/2023-Hepatitis Panel, Quantiferon  # Elevated BP readings unique to clinic visits  #Elevated Vitamin E level - asked to discontinue supplement, repeat in the fall  # Slightly abnormal lipids, stable  - asked patient to also review with her primary care provider  ____________________________________________    Assessment & Plan:     # Alopecia Areata/Universalis, stable with some hair growth  - continue half a tablet of Minoxidil 2.5 mg daily. Refilled today  - continue upadacitinib daily (started sept. 2024). Refilled today.  - Restart vitamin d 1000 international unit(s) in the winter time  - continue DHS Zinc shampoo twice a week  - the following labs were ordered today: CBC with diff, CMP lipid panel  - BP: 144/91 however, pt notes that when she checks with her machine at home, her blood pressure is normal.         Procedures Performed:   None.    Follow-up: 7-8 month(s) in-person, or earlier for new or changing lesions    Staff and Scribe  I, JO BERMUDEZ, am serving as a scribe; to document services personally performed by Zainab Oliver MD -based on data collection and the provider's statements to me.    Provider Disclosure:   The documentation recorded by the scribe accurately reflects the services I personally performed and the decisions made by me.    Zainab Oliver MD  Professor   Department of Dermatology  [facility]  [facility]: Phone: [phone], Fax:[phone]  [facility]: Phone: [phone], Fax: [phone]      ____________________________________________    CC: Hair Loss (Patient is here for a hir loss follow up, hair loss is growing, areas of concern none)    HPI:  Ms. Lupe Deluna is a 54 year old female who presents as a return patient for follow-up of hair loss, diagnosed as  alopecia universalis.   - Last seen in-clinic on 11/12/24  - Shedding or thinning, or both: n/a  - Current tx:  alternating 0.5 mg tablet with minoxidil 2.5 mg every other day; upadacitinib daily; Dr. Snell shampoo   - If using Rogaine, 1 cannister lasts how long: N/A  No Any new medications, supplements, or products? (please list below)     No Scalp pain   No Scalp burning   No Scalp itching    No Eyebrow changes    No Eyelash changes   No Beard changes    No Other body hair changes    No Nail changes    No Additional symptoms? (please list below)     - Overall course: Pt is doing well. She reports that this is the most growth that she has had in a long time. She states that she was recommended to move more for the leg swelling. Thinks that alopecia universalis started after she had Covid in 2019, shaved head in August of 2020 because it was falling out in clumps. Now, on Rinvoq, she states that her eyebrows and eyelashes are growing back    Patient is otherwise feeling well, in usual state of health, and has no additional skin concerns today.         Labs:  Lipids, CBC , and CMP  reviewed.    Physical Exam:  Vitals: BP (!) 144/91   Pulse 60   SpO2 97%   GEN: Well developed, well-nourished, in no acute distress, in a pleasant mood.    SKIN: Focused examination of face, scalp and hands was performed.  - thinning in left temple  - 2-3% thinning on lower scalp  SALT score:  Front: 0%  Back: 2%  Left: 2%  Right: 1 %  - no diffuse erythema   -  no perifollicular erythema  - no perifollicular scale   - no scaling of the scalp   - negative hair pull test   - normal eyelash density  - lateral thinning of the left eyebrow density  - no nail pitting or dystrophy   - no scalp folliculitis/pustules   - No other lesions of concern on areas examined.       Medications:  Current Outpatient Medications   Medication Sig Dispense Refill    minoxidil (LONITEN) 2.5 MG tablet Take half tablet (1.25 mg) by mouth every other day alternating with one tablet daily 90 tablet 1    NONFORMULARY Please provide 1 scalp prosthesis for alopecia areata 1 each 1    Upadacitinib ER (RINVOQ) 30 MG TB24 Take 30 mg by mouth daily. 90 tablet 0    Vitamin D3 (VITAMIN D, CHOLECALCIFEROL,) 25 mcg (1000 units) tablet Take 1 tablet by mouth daily.      MAGNESIUM GLYCINATE PO Take 260 mg by mouth daily. (Patient not taking: Reported on 7/8/2025)      magnesium sulfate 500 mg/mL SOLN Take 500 mg by mouth daily.       No current facility-administered medications for this visit.      Past Medical History:   Patient Active Problem List   Diagnosis    Fatigue     No past medical history on file.    CC No referring provider defined for this encounter. on close of this encounter.

## 2025-07-08 NOTE — LETTER
7/8/2025      Lupe Deluna  [address] [address]      Dear Colleague,    Thank you for referring your patient, Lupe Deluna, to the [facility]. Please see a copy of my visit note below.    [facility] Dermatology Note  Encounter Date: Jul 8, 2025  Office Visit    Dermatology Problem List:  1.   Alopecia universalis,now with evidence of hair regrowth  - Biopsies: 9/24/20 (consistent with alopecia areata; full report in Media tab)  - SALT score: 06/03/24: 95%  - Prior Tx: baricitinib 4mg (lost all hair), tofacitinib 5mg BID, ritlecitinib 50mg daily (lost all hair)  - Current tx: half a tablet of Minoxidil 2.5 mg, upadacitinib daily, dhs zinc twice a week              - Restarted 4/4/23              - Safety labs: 10/02/2023-Hepatitis Panel, Quantiferon  # Elevated BP readings unique to clinic visits  #Elevated Vitamin E level - asked to discontinue supplement, repeat in the fall  # Slightly abnormal lipids, stable  - asked patient to also review with her primary care provider  ____________________________________________    Assessment & Plan:     # Alopecia Areata/Universalis, stable with some hair growth  - continue half a tablet of Minoxidil 2.5 mg daily. Refilled today  - continue upadacitinib daily (started sept. 2024). Refilled today.  - Restart vitamin d 1000 international unit(s) in the winter time  - continue DHS Zinc shampoo twice a week  - the following labs were ordered today: CBC with diff, CMP lipid panel  - BP: 144/91 however, pt notes that when she checks with her machine at home, her blood pressure is normal.         Procedures Performed:   None.    Follow-up: 7-8 month(s) in-person, or earlier for new or changing lesions    Staff and Scribe  ESSIE, JO BERMUDEZ, am serving as a scribe; to document services personally performed by Zainab Oliver MD -based on data collection and the provider's statements to  me.    Provider Disclosure:   The documentation recorded by the scribe accurately reflects the services I personally performed and the decisions made by me.    Zainab Oliver MD  Professor   Department of Dermatology  [facility]: Phone: [phone], Fax:[phone]  [facility]: Phone: [phone], Fax: [phone]      ____________________________________________    CC: Hair Loss (Patient is here for a hir loss follow up, hair loss is growing, areas of concern none)    HPI:  Ms. Lupe Deluna is a 54 year old female who presents as a return patient for follow-up of hair loss, diagnosed as  alopecia universalis.   - Last seen in-clinic on 11/12/24  - Shedding or thinning, or both: n/a  - Current tx:  alternating 0.5 mg tablet with minoxidil 2.5 mg every other day; upadacitinib daily; Dr. Snell shampoo   - If using Rogaine, 1 cannister lasts how long: N/A  No Any new medications, supplements, or products? (please list below)     No Scalp pain   No Scalp burning   No Scalp itching    No Eyebrow changes    No Eyelash changes   No Beard changes    No Other body hair changes    No Nail changes    No Additional symptoms? (please list below)     - Overall course: Pt is doing well. She reports that this is the most growth that she has had in a long time. She states that she was recommended to move more for the leg swelling. Thinks that alopecia universalis started after she had Covid in 2019, shaved head in August of 2020 because it was falling out in clumps. Now, on Rinvoq, she states that her eyebrows and eyelashes are growing back    Patient is otherwise feeling well, in usual state of health, and has no additional skin concerns today.         Labs:  Lipids, CBC , and CMP  reviewed.    Physical Exam:  Vitals: BP (!) 144/91   Pulse 60   SpO2 97%   GEN: Well developed, well-nourished, in no acute  distress, in a pleasant mood.    SKIN: Focused examination of face, scalp and hands was performed.  - thinning in left temple  - 2-3% thinning on lower scalp  SALT score:  Front: 0%  Back: 2%  Left: 2%  Right: 1 %  - no diffuse erythema   - no perifollicular erythema  - no perifollicular scale   - no scaling of the scalp   - negative hair pull test   - normal eyelash density  - lateral thinning of the left eyebrow density  - no nail pitting or dystrophy   - no scalp folliculitis/pustules   - No other lesions of concern on areas examined.       Medications:  Current Outpatient Medications   Medication Sig Dispense Refill     minoxidil (LONITEN) 2.5 MG tablet Take half tablet (1.25 mg) by mouth every other day alternating with one tablet daily 90 tablet 1     NONFORMULARY Please provide 1 scalp prosthesis for alopecia areata 1 each 1     Upadacitinib ER (RINVOQ) 30 MG TB24 Take 30 mg by mouth daily. 90 tablet 0     Vitamin D3 (VITAMIN D, CHOLECALCIFEROL,) 25 mcg (1000 units) tablet Take 1 tablet by mouth daily.       MAGNESIUM GLYCINATE PO Take 260 mg by mouth daily. (Patient not taking: Reported on 7/8/2025)       magnesium sulfate 500 mg/mL SOLN Take 500 mg by mouth daily.       No current facility-administered medications for this visit.      Past Medical History:   Patient Active Problem List   Diagnosis     Fatigue     No past medical history on file.    CC No referring provider defined for this encounter. on close of this encounter.      Again, thank you for allowing me to participate in the care of your patient.        Sincerely,        Zainab Oliver MD    Electronically signed

## 2025-07-08 NOTE — PROGRESS NOTES
Patient seen by Dr. Oliver today -- further refills requested. Last safety labs done 6/17/25. Next follow up 2/17/26. 6 month refill sent to [facility].

## 2025-07-14 ENCOUNTER — TELEPHONE (OUTPATIENT)
Dept: DERMATOLOGY | Facility: CLINIC | Age: 55
End: 2025-07-14
Payer: COMMERCIAL

## 2025-07-14 NOTE — TELEPHONE ENCOUNTER
PA Initiation    Medication: RINVOQ 30 MG PO TB24  Insurance Company: [employer] - Phone [phone] Fax [phone]  Pharmacy Filling the Rx:    Filling Pharmacy Phone:    Filling Pharmacy Fax:    Start Date: 7/14/2025      Key: [id number]

## 2025-07-14 NOTE — LETTER
7/30/25     RE: Lupe Deluna  [address] [facility] [address]      To whom it may concern,     I am writing this letter of medical necessity in regards to the recent denial of Rinvoq. Lupe Deluna is a pleasant 54 year old female who unfortunately suffers from alopecia areata (AA). Her diagnosis was confirmed with a biopsy done on 9/24/20.      Alopecia areata is a recurrent non-scarring type of hair loss that can affect any hair-bearing area and can manifest in many different patterns. Although it is a benign condition and most patients are asymptomatic, it can cause emotional and psychosocial distress. AA is a common autoimmune disorder and there are no reliably effective therapies for AA. Suggested therapies include topicals, injections, phototherapy, and systemics. The patient has tried most of these without success along with other CANDIDA inhibitors. Of note, she did not have success with Olumiant or Litfulo, but Xeljanz was helpful for her in the past. We feel like Rinvoq will be helpful for this patient since Rinvoq also inhibits JAK1 like Xeljanz.  Lupe has been able to obtain Rinvoq via [employer] and has been taking it since September 2024.  Since starting Rinvoq, she has had no more shedding or thinning, and she has had growth on her scalp, eyebrows, and eyelashes.  At follow-up on 7/8/2025 she noted that since starting Rinvoq she has had the most growth she has had in a long time.  In addition, she is tolerating the Rinvoq with no side effects.    Patient has tried the following treatments with no success:   Olumiant 4mg (lost all hair)  Xeljanz 5mg TWICE DAILY -- helpful but was no longer covered.   Litfulo 50mg daily (lost all hair)  Minoxidil 2.5 mg oral      Up until recently, there were no FDA-approved treatments for AA, however drugs that target the Janus kinase (CANDIDA) pathway have shown promise for alopecia areata; inducing hair growth in a large proportion of  participants in studies.  We feel that Rinvoq therapy for advanced alopecia areata represents an important breakthrough for patients with no alternatives. Rinvoq is a JAK1 inhibitor that curbs the interferon-gamma response inflammatory pathway and AA shares the same interferon response pathway. Rinvoq also appears to stop the immune system's attack on the hair follicle in alopecia areata. Just recently Olumiant (baracitinib), a CANDIDA 1 and CANDIDA 2 inhibitor was approved by the FDA for the treatment of alopecia areata. In addition, a phase 3 study of Rinvoq ([id number]) to Evaluate the Safety and Effectiveness of Upadacitinib Tablets in Adult and Adolescent Participants With Severe Alopecia Areata is actually being performed.     Below are studies that support the use of Rinvoq in AA:   https://clinicaltrials.gov/study/PEF07897892  Tannerdimitrios PHOENIX, Savanna PAULA, Chary Á, Kevin R. Complete response of extensive alopecia areata refractory to baricitinib after five months of treatment with upadacitinib. J Dermatolog Treat. 2024;35(1):4423278. doi:10.1080/76087429.2024.7455422  Hector X, Andreas D, Marino L, Daniel J, Lucien K, Tahmina VALENZUELA. Upadacitinib for Alopecia Areata in Different Backgrounds: A Case Series. Clin Cosmet Investig Dermatol. 2024;17:565-571. Published 2024 Mar 9. doi:10.2147/CCID.[id number]  Sagrario ORTIZ, [place] SIMMS. Upadacitinib for management of recalcitrant alopecia areata: A retrospective case series. JAAD Case Rep. 2023;35:38-42. Published 2023 Mar 8. doi:10.1016/j.jdcr.2023.02.019  Jay Slaughter. Effective treatment of alopecia universalis with oral upadacitinib. JAAD Case Rep. 2022;31:80-82. Published 2022 Aug 19. doi:10.1016/j.jdcr.2022.08.014     We feel that Rinvoq tablets are the best choice of therapy for Lupe and it has been effective for her since September 2024. We would like to continue to pursue this course of therapy and are requesting that you approve our decision to  continue to provide Rinvoq to our patient, allowing us to provide the best treatment option available. We thank you for your immediate attention to this issue and we would appreciate haste in your determination.        Sincerely,     Zainab Oliver MD  [facility]

## 2025-07-21 NOTE — TELEPHONE ENCOUNTER
Faxed [place] denial letter to [employer] at [phone]. Fax confirmed sent.    Thank you,     Daniel Bustamante [facility]  Pharmacy Clinic [facility]  [email or web address]   Phone: [phone]  Fax: [phone]

## 2025-07-21 NOTE — TELEPHONE ENCOUNTER
PRIOR AUTHORIZATION DENIED    Medication: RINVOQ 30 MG PO TB24  Insurance Company: [id number] - Phone [phone] Fax [phone]  Denial Date: 7/17/2025  Denial Reason(s):     Appeal Information:     Patient Notified: No           Thank you,     Daniel Bustamante [facility]  Pharmacy Clinic [facility]  [email or web address]   Phone: [phone]  Fax: [phone]

## 2025-07-30 NOTE — TELEPHONE ENCOUNTER
[facility] Call Center    Phone Message    May a detailed message be left on voicemail: yes     Reason for Call: Medication Question or concern regarding medication   Prescription Clarification  Name of Medication: Rinvoq  Prescribing Provider: Dr. Oliver   What on the order needs clarification? Baldo from [employer] called for clarification on the appeal letter, anything else they should keep in mind in addition to the appeal letter? Will the provider be reaching out to the  for compassionate care coverage? Please leave pt name, birthday, & medication name if leaving a voicemail        Action Taken: Other: TE to [facility]    Travel Screening: Not Applicable     Date of Service:

## 2025-07-30 NOTE — TELEPHONE ENCOUNTER
Medication Appeal Initiation    Medication: RINVOQ 30 MG PO TB24  Appeal Start Date:  7/30/2025  Insurance Company: [employer]  Insurance Phone: [phone]  Insurance Fax: [phone]  Comments:   faxed appeal      no

## 2025-08-01 ENCOUNTER — TELEPHONE (OUTPATIENT)
Dept: DERMATOLOGY | Facility: CLINIC | Age: 55
End: 2025-08-01
Payer: COMMERCIAL